# Patient Record
Sex: MALE | URBAN - METROPOLITAN AREA
[De-identification: names, ages, dates, MRNs, and addresses within clinical notes are randomized per-mention and may not be internally consistent; named-entity substitution may affect disease eponyms.]

---

## 2018-09-21 ENCOUNTER — APPOINTMENT (RX ONLY)
Dept: URBAN - METROPOLITAN AREA CLINIC 20 | Facility: CLINIC | Age: 53
Setting detail: DERMATOLOGY
End: 2018-09-21

## 2018-09-21 PROBLEM — C44.319 BASAL CELL CARCINOMA OF SKIN OF OTHER PARTS OF FACE: Status: ACTIVE | Noted: 2018-09-21

## 2018-09-21 PROCEDURE — ? MOHS SURGERY PHONE CONSULTATION

## 2018-09-21 PROCEDURE — ? PATIENT SPECIFIC COUNSELING

## 2018-09-21 NOTE — PROCEDURE: PATIENT SPECIFIC COUNSELING
Pt has an allergy to Aropiprazole, Risperidone \\nPt takes Amlodipine for hypertension.
Detail Level: Zone

## 2018-09-21 NOTE — PROCEDURE: MOHS SURGERY PHONE CONSULTATION
Patient's Insurance: Medicaid
Has The Pathology Report Been Received?: Yes
Does The Patient Take Blood Thinners?: No
Which Antibiotic Do They Take For Surgical Prophylaxis?: Amoxicillin (2 grams)
Time Of Mohs Surgery: 11:50AM
Date Of Mohs Surgery: 10/16/2018
Detail Level: Simple
Referring Provider: Dr. Jessica Bhakta
Office Location Of Mohs Surgery: [place] [id number]
Patient Reported Location: RIght Face

## 2018-10-16 ENCOUNTER — APPOINTMENT (RX ONLY)
Dept: URBAN - METROPOLITAN AREA CLINIC 36 | Facility: CLINIC | Age: 53
Setting detail: DERMATOLOGY
End: 2018-10-16

## 2018-10-16 PROBLEM — C44.212 BASAL CELL CARCINOMA OF SKIN OF RIGHT EAR AND EXTERNAL AURICULAR CANAL: Status: ACTIVE | Noted: 2018-10-16

## 2018-10-16 PROBLEM — I10 ESSENTIAL (PRIMARY) HYPERTENSION: Status: ACTIVE | Noted: 2018-10-16

## 2018-10-16 PROCEDURE — ? MOHS SURGERY

## 2018-10-16 PROCEDURE — 17312 MOHS ADDL STAGE: CPT

## 2018-10-16 PROCEDURE — 17311 MOHS 1 STAGE H/N/HF/G: CPT

## 2018-10-16 NOTE — PROCEDURE: MOHS SURGERY
Stage 9: Additional Anesthesia Type: 1% lidocaine with epinephrine
Anesthesia Volume In Cc: 6
Plastic Surgeon Procedure Text (B): After obtaining clear surgical margins the patient was sent to plastics for surgical repair.  The patient understands they will receive post-surgical care and follow-up from the referring physician's office.
Consent (Marginal Mandibular)/Introductory Paragraph: The rationale for Mohs was explained to the patient and consent was obtained. The risks, benefits and alternatives to therapy were discussed in detail. Specifically, the risks of damage to the marginal mandibular branch of the facial nerve, infection, scarring, bleeding, prolonged wound healing, incomplete removal, allergy to anesthesia, and recurrence were addressed. Prior to the procedure, the treatment site was clearly identified and confirmed by the patient. All components of Universal Protocol/PAUSE Rule completed.
Banner Transposition Flap Text: The defect edges were debeveled with a #15 scalpel blade.  Given the location of the defect and the proximity to free margins a Banner transposition flap was deemed most appropriate.  Using a sterile surgical marker, an appropriate flap drawn around the defect. The area thus outlined was incised deep to adipose tissue with a #15 scalpel blade.  The skin margins were undermined to an appropriate distance in all directions utilizing iris scissors.
Cheiloplasty (Less Than 50%) Text: A decision was made to reconstruct the defect with a  cheiloplasty.  The defect was undermined extensively.  Additional obicularis oris muscle was excised with a 15 blade scalpel.  The defect was converted into a full thickness wedge, of less than 50% of the vertical height of the lip, to facilite a better cosmetic result.  Small vessels were then tied off with 5-0 monocyrl. The obicularis oris, superficial fascia, adipose and dermis were then reapproximated.  After the deeper layers were approximated the epidermis was reapproximated with particular care given to realign the vermilion border.
Stage 3: Additional Anesthesia Type: 1% lidocaine with 1:100,000 epinephrine and 408mcg clindamycin/ml and a 1:10 solution of 8.4% sodium bicarbonate
Quadrants Reporting?: 0
Area L Indication Text: Tumors in this location are included in Area L (trunk and extremities).  Mohs surgery is indicated for larger tumors, or tumors with aggressive histologic features, in these anatomic locations.
Tissue Cultured Epidermal Autograft Text: The defect edges were debeveled with a #15 scalpel blade.  Given the location of the defect, shape of the defect and the proximity to free margins a tissue cultured epidermal autograft was deemed most appropriate.  The graft was then trimmed to fit the size of the defect.  The graft was then placed in the primary defect and oriented appropriately.
Quadrant Reporting?: no
Lazy S Complex Repair Preamble Text (Leave Blank If You Do Not Want): Extensive wide undermining was performed at least 2 cm in all directions.
Double Island Pedicle Flap Text: The defect edges were debeveled with a #15 scalpel blade.  Given the location of the defect, shape of the defect and the proximity to free margins a double island pedicle advancement flap was deemed most appropriate.  Using a sterile surgical marker, an appropriate advancement flap was drawn incorporating the defect, outlining the appropriate donor tissue and placing the expected incisions within the relaxed skin tension lines where possible.    The area thus outlined was incised deep to adipose tissue with a #15 scalpel blade.  The skin margins were undermined to an appropriate distance in all directions around the primary defect and laterally outward around the island pedicle utilizing iris scissors.  There was minimal undermining beneath the pedicle flap.
Subsequent Stages Histo Method Verbiage: Using a similar technique to that described above, a thin layer of tissue was removed from all areas where tumor was visible on the previous stage.  The tissue was again oriented, mapped, dyed, and processed as above.
Provider Procedure Text (D): After obtaining clear surgical margins the defect was repaired by another provider.
Show Specific Providers When Referring To Others For Closure?: Yes
Mercedes Flap Text: The defect edges were debeveled with a #15 scalpel blade.  Given the location of the defect, shape of the defect and the proximity to free margins a Mercedes flap was deemed most appropriate.  Using a sterile surgical marker, an appropriate advancement flap was drawn incorporating the defect and placing the expected incisions within the relaxed skin tension lines where possible. The area thus outlined was incised deep to adipose tissue with a #15 scalpel blade.  The skin margins were undermined to an appropriate distance in all directions utilizing iris scissors.
Consent Type: Consent 1 (Standard)
Initial Size Of Lesion: 1.3
A-T Advancement Flap Text: The defect edges were debeveled with a #15 scalpel blade.  Given the location of the defect, shape of the defect and the proximity to free margins an A-T advancement flap was deemed most appropriate.  Using a sterile surgical marker, an appropriate advancement flap was drawn incorporating the defect and placing the expected incisions within the relaxed skin tension lines where possible.    The area thus outlined was incised deep to adipose tissue with a #15 scalpel blade.  The skin margins were undermined to an appropriate distance in all directions utilizing iris scissors.
Cheek Interpolation Flap Text: A decision was made to reconstruct the defect utilizing an interpolation axial flap and a staged reconstruction.  A telfa template was made of the defect.  This telfa template was then used to outline the Cheek Interpolation flap.  The donor area for the pedicle flap was then injected with anesthesia.  The flap was excised through the skin and subcutaneous tissue down to the layer of the underlying musculature.  The interpolation flap was carefully excised within this deep plane to maintain its blood supply.  The edges of the donor site were undermined.   The donor site was closed in a primary fashion.  The pedicle was then rotated into position and sutured.  Once the tube was sutured into place, adequate blood supply was confirmed with blanching and refill.  The pedicle was then wrapped with xeroform gauze and dressed appropriately with a telfa and gauze bandage to ensure continued blood supply and protect the attached pedicle.
Home Suture Removal Text: Patient was provided instructions on removing sutures and will remove their sutures at home.  If they have any questions or difficulties they will call the office.
Dressing: dry sterile dressing
Closure 3 Information: This tab is for additional flaps and grafts above and beyond our usual structured repairs.  Please note if you enter information here it will not currently bill and you will need to add the billing information manually.
Mid-Level Procedure Text (C): After obtaining clear surgical margins the patient was sent to a mid-level provider for surgical repair.  The patient understands they will receive post-surgical care and follow-up from the mid-level provider.
O-T Plasty Text: The defect edges were debeveled with a #15 scalpel blade.  Given the location of the defect, shape of the defect and the proximity to free margins an O-T plasty was deemed most appropriate.  Using a sterile surgical marker, an appropriate O-T plasty was drawn incorporating the defect and placing the expected incisions within the relaxed skin tension lines where possible.    The area thus outlined was incised deep to adipose tissue with a #15 scalpel blade.  The skin margins were undermined to an appropriate distance in all directions utilizing iris scissors.
Oculoplastic Surgeon Procedure Text (B): After obtaining clear surgical margins the patient was sent to oculoplastics for surgical repair.  The patient understands they will receive post-surgical care and follow-up from the referring physician's office.
Ftsg Text: The defect edges were debeveled with a #15 scalpel blade.  Given the location of the defect, shape of the defect and the proximity to free margins a full thickness skin graft was deemed most appropriate.  Using a sterile surgical marker, the primary defect shape was transferred to the donor site. The area thus outlined was incised deep to adipose tissue with a #15 scalpel blade.  The harvested graft was then trimmed of adipose tissue until only dermis and epidermis was left.  The skin margins of the secondary defect were undermined to an appropriate distance in all directions utilizing iris scissors.  The secondary defect was closed with interrupted buried subcutaneous sutures.  The skin edges were then re-apposed with running  sutures.  The skin graft was then placed in the primary defect and oriented appropriately.
Consent 1/Introductory Paragraph: The rationale for Mohs was explained to the patient and consent was obtained. The risks, benefits and alternatives to therapy were discussed in detail. Specifically, the risks of infection, scarring, bleeding, prolonged wound healing, incomplete removal, allergy to anesthesia, nerve injury and recurrence were addressed. Prior to the procedure, the treatment site was clearly identified and confirmed by the patient. All components of Universal Protocol/PAUSE Rule completed.
Melolabial Interpolation Flap Text: A decision was made to reconstruct the defect utilizing an interpolation axial flap and a staged reconstruction.  A telfa template was made of the defect.  This telfa template was then used to outline the melolabial interpolation flap.  The donor area for the pedicle flap was then injected with anesthesia.  The flap was excised through the skin and subcutaneous tissue down to the layer of the underlying musculature.  The pedicle flap was carefully excised within this deep plane to maintain its blood supply.  The edges of the donor site were undermined.   The donor site was closed in a primary fashion.  The pedicle was then rotated into position and sutured.  Once the tube was sutured into place, adequate blood supply was confirmed with blanching and refill.  The pedicle was then wrapped with xeroform gauze and dressed appropriately with a telfa and gauze bandage to ensure continued blood supply and protect the attached pedicle.
M-Plasty Intermediate Repair Preamble Text (Leave Blank If You Do Not Want): Undermining was performed with blunt dissection.
Epidermal Closure: running cuticular
Dorsal Nasal Flap Text: The defect edges were debeveled with a #15 scalpel blade.  Given the location of the defect and the proximity to free margins a dorsal nasal flap,based upon the glabellar folds, was deemed most appropriate.  Using a sterile surgical marker, an appropriate dorsal nasal flap was drawn around the defect.    The area thus outlined was incised deep to adipose tissue with a #15 scalpel blade.  The skin margins were undermined to an appropriate distance in all directions utilizing iris scissors.
Primary Defect Length In Cm (Final Defect Size - Required For Flaps/Grafts): 1.8
Unique Flap 3 Text: The defect edges were debeveled with a #15 scalpel blade.  Given the location of the defect, shape of the defect and the proximity to free margins a Mercedes (double advancement flap) was deemed most appropriate.  Using a sterile surgical marker, the appropriate transposition flaps were drawn incorporating the defect and placing the expected incisions within the relaxed skin tension lines where possible.    The area thus outlined was incised deep to adipose tissue with a #15 scalpel blade.  The skin margins were undermined to an appropriate distance in all directions utilizing iris scissors.  Hemostasis was achieved with electrocautery.  The flaps were then advanced into the defect and anchored with interrupted buried subcutaneous sutures.
Unique Flap 2 Name: Peng Flap
Helical Rim Advancement Flap Text: The defect edges were debeveled with a #15 blade scalpel.  Given the location of the defect and the proximity to free margins (helical rim) a double helical rim advancement flap was deemed most appropriate.  Using a sterile surgical marker, the appropriate advancement flaps were drawn incorporating the defect and placing the expected incisions between the helical rim and antihelix where possible.  The area thus outlined was incised through and through with a #15 scalpel blade.  With a skin hook and iris scissors, the flaps were gently and sharply undermined and freed up.
No Repair - Repaired With Adjacent Surgical Defect Text (Leave Blank If You Do Not Want): After obtaining clear surgical margins the defect was repaired concurrently with another surgical defect which was in close approximation.
Localized Dermabrasion With Wire Brush Text: The patient was draped in routine manner.  Localized dermabrasion using 3 x 17 mm wire brush was performed in routine manner to papillary dermis. This spot dermabrasion is being performed to complete skin cancer reconstruction. It also will eliminate the other sun damaged precancerous cells that are known to be part of the regional effect of a lifetime's worth of sun exposure. This localized dermabrasion is therapeutic and should not be considered cosmetic in any regard.
Cartilage Graft Text: The defect edges were debeveled with a #15 scalpel blade.  Given the location of the defect, shape of the defect, the fact the defect involved a full thickness cartilage defect a cartilage graft was deemed most appropriate.  An appropriate donor site was identified, cleansed, and anesthetized. The cartilage graft was then harvested and transferred to the recipient site, oriented appropriately and then sutured into place.  The secondary defect was then repaired using a primary closure.
Epidermal Closure Graft Donor Site (Optional): simple interrupted
V-Y Plasty Text: The defect edges were debeveled with a #15 scalpel blade.  Given the location of the defect, shape of the defect and the proximity to free margins an V-Y advancement flap was deemed most appropriate.  Using a sterile surgical marker, an appropriate advancement flap was drawn incorporating the defect and placing the expected incisions within the relaxed skin tension lines where possible.    The area thus outlined was incised deep to adipose tissue with a #15 scalpel blade.  The skin margins were undermined to an appropriate distance in all directions utilizing iris scissors.
Consent (Spinal Accessory)/Introductory Paragraph: The rationale for Mohs was explained to the patient and consent was obtained. The risks, benefits and alternatives to therapy were discussed in detail. Specifically, the risks of damage to the spinal accessory nerve, infection, scarring, bleeding, prolonged wound healing, incomplete removal, allergy to anesthesia, and recurrence were addressed. Prior to the procedure, the treatment site was clearly identified and confirmed by the patient. All components of Universal Protocol/PAUSE Rule completed.
Asc Procedure Text (C): After obtaining clear surgical margins the patient was sent to an ASC for surgical repair.  The patient understands they will receive post-surgical care and follow-up from the ASC physician.
Hemostasis: Electrocautery
Star Wedge Flap Text: The defect edges were debeveled with a #15 scalpel blade.  Given the location of the defect, shape of the defect and the proximity to free margins a star wedge flap was deemed most appropriate.  Using a sterile surgical marker, an appropriate rotation flap was drawn incorporating the defect and placing the expected incisions within the relaxed skin tension lines where possible. The area thus outlined was incised deep to adipose tissue with a #15 scalpel blade.  The skin margins were undermined to an appropriate distance in all directions utilizing iris scissors.
Otolaryngologist Procedure Text (D): After obtaining clear surgical margins the patient was sent to otolaryngology for surgical repair.  The patient understands they will receive post-surgical care and follow-up from the referring physician's office.
Consent (Scalp)/Introductory Paragraph: The rationale for Mohs was explained to the patient and consent was obtained. The risks, benefits and alternatives to therapy were discussed in detail. Specifically, the risks of changes in hair growth pattern secondary to repair, infection, scarring, bleeding, prolonged wound healing, incomplete removal, allergy to anesthesia, nerve injury and recurrence were addressed. Prior to the procedure, the treatment site was clearly identified and confirmed by the patient. All components of Universal Protocol/PAUSE Rule completed.
Mohs Rapid Report Verbiage: The area of clinically evident tumor was marked with skin marking ink and appropriately hatched.  The initial incision was made following the Mohs approach through the skin.  The specimen was taken to the lab, divided into the necessary number of pieces, chromacoded and processed according to the Mohs protocol.  This was repeated in successive stages until a tumor free defect was achieved.
Modified Advancement Flap Text: The defect edges were debeveled with a #15 scalpel blade.  Given the location of the defect, shape of the defect and the proximity to free margins a modified advancement flap was deemed most appropriate.  Using a sterile surgical marker, an appropriate advancement flap was drawn incorporating the defect and placing the expected incisions within the relaxed skin tension lines where possible.    The area thus outlined was incised deep to adipose tissue with a #15 scalpel blade.  The skin margins were undermined to an appropriate distance in all directions utilizing iris scissors.
Double M-Plasty Complex Repair Preamble Text (Leave Blank If You Do Not Want): Extensive wide undermining was performed.
Unique Flap 1 Text: A decision was made to reconstruct the defect utilizing a myocutaneous Island pedicle Flap based on the levator labii superioris muscle.  A telfa template was made of the defect.  This telfa template was then used to outline the myocutaneous flap, based along the meilolabial fold.  The donor area for the pedicle flap was then injected with anesthesia.  The flap was excised through the skin and subcutaneous tissue down to the layer of the underlying musculature.  The myocutaneous flap was carefully excised within this deep plane to maintain its blood supply. Based on the muscle. The edges of the donor site were undermined.   The donor site was closed in a primary fashion to the point of transposition.  The pedicle was then transposed into position and sutured.  Once the flap was sutured into place, adequate blood supply was confirmed with blanching and refill.
Melolabial Transposition Flap Text: The defect edges were debeveled with a #15 scalpel blade.  Given the location of the defect and the proximity to free margins a melolabial flap was deemed most appropriate.  Using a sterile surgical marker, an appropriate melolabial transposition flap was drawn incorporating the defect.    The area thus outlined was incised deep to adipose tissue with a #15 scalpel blade.  The skin margins were undermined to an appropriate distance in all directions utilizing iris scissors.
Tarsorrhaphy Text: A tarsorrhaphy was performed using Frost sutures.
Medical Necessity Statement: Based on my medical judgement, Mohs surgery is the most appropriate treatment for this cancer compared to other treatments.
Secondary Intention Text (Leave Blank If You Do Not Want): The defect will heal with secondary intention.
Mauc Instructions: By selecting yes to the question below the MAUC number will be added into the note.  This will be calculated automatically based on the diagnosis chosen, the size entered, the body zone selected (H,M,L) and the specific indications you chose. You will also have the option to override the Mohs AUC if you disagree with the automatically calculated number and this option is found in the Case Summary tab.
Wound Care: Aquaphor
Cheiloplasty (Complex) Text: A decision was made to reconstruct the defect with a  cheiloplasty.  The defect was undermined extensively.  Additional obicularis oris muscle was excised with a 15 blade scalpel.  The defect was converted into a full thickness wedge to facilite a better cosmetic result.  Small vessels were then tied off with 5-0 monocyrl. The obicularis oris, superficial fascia, adipose and dermis were then reapproximated.  After the deeper layers were approximated the epidermis was reapproximated with particular care given to realign the vermilion border.
Ear Star Wedge Flap Text: The defect edges were debeveled with a #15 blade scalpel.  Given the location of the defect and the proximity to free margins (helical rim) an ear star wedge flap was deemed most appropriate.  Using a sterile surgical marker, the appropriate flap was drawn incorporating the defect and placing the expected incisions between the helical rim and antihelix where possible.  The area thus outlined was incised through and through with a #15 scalpel blade.
Graft Donor Site Epidermal Sutures (Optional): 5-0 Ethibond
Crescentic Advancement Flap Text: The defect edges were debeveled with a #15 scalpel blade.  Given the location of the defect and the proximity to free margins a crescentic advancement flap was deemed most appropriate.  Using a sterile surgical marker, the appropriate advancement flap was drawn incorporating the defect and placing the expected incisions within the relaxed skin tension lines where possible.    The area thus outlined was incised deep to adipose tissue with a #15 scalpel blade.  The skin margins were undermined to an appropriate distance in all directions utilizing iris scissors.
Inflammation Suggestive Of Cancer Camouflage Histology Text: There was a dense lymphocytic infiltrate which prevented adequate histologic evaluation of adjacent structures.
Eye Protection Verbiage: Before proceeding with the stage, a plastic scleral shield was inserted. The globe was anesthetized with a few drops of 1% lidocaine with 1:100,000 epinephrine. Then, an appropriate sized scleral shield was chosen and coated with lacrilube ointment. The shield was gently inserted and left in place for the duration of each stage. After the stage was completed, the shield was gently removed.
Bcc Infiltrative Histology Text: There were numerous aggregates of basaloid cells demonstrating an infiltrative pattern.
Skin Substitute Text: The defect edges were debeveled with a #15 scalpel blade.  Given the location of the defect, shape of the defect and the proximity to free margins a skin substitute graft was deemed most appropriate.  The graft material was trimmed to fit the size of the defect. The graft was then placed in the primary defect and oriented appropriately.
Keystone Flap Text: The defect edges were debeveled with a #15 scalpel blade.  Given the location of the defect, shape of the defect a keystone flap was deemed most appropriate.  Using a sterile surgical marker, an appropriate keystone flap was drawn incorporating the defect, outlining the appropriate donor tissue and placing the expected incisions within the relaxed skin tension lines where possible. The area thus outlined was incised deep to adipose tissue with a #15 scalpel blade.  The skin margins were undermined to an appropriate distance in all directions around the primary defect and laterally outward around the flap utilizing iris scissors.
Anesthesia Type: 1% lidocaine with 1:100,000 epinephrine and a 1:10 solution of 8.4% sodium bicarbonate
Unique Flap 4 Text: The defect edges were debeveled with a #15 scalpel blade.  Given the location of the defect and the proximity to free margins a Banner transposition flap was deemed most appropriate.  Using a sterile surgical marker, an appropriate Banner transposition flap was drawn incorporating the defect.    The area thus outlined was incised deep to adipose tissue with a #15 scalpel blade.  The skin margins were undermined to an appropriate distance in all directions utilizing iris scissors.
Spiral Flap Text: The defect edges were debeveled with a #15 scalpel blade.  Given the location of the defect, shape of the defect and the proximity to free margins a spiral flap was deemed most appropriate.  Using a sterile surgical marker, an appropriate rotation flap was drawn incorporating the defect and placing the expected incisions within the relaxed skin tension lines where possible. The area thus outlined was incised deep to adipose tissue with a #15 scalpel blade.  The skin margins were undermined to an appropriate distance in all directions utilizing iris scissors.
O-L Flap Text: The defect edges were debeveled with a #15 scalpel blade.  Given the location of the defect, shape of the defect and the proximity to free margins an O-L flap was deemed most appropriate.  Using a sterile surgical marker, an appropriate advancement flap was drawn incorporating the defect and placing the expected incisions within the relaxed skin tension lines where possible.    The area thus outlined was incised deep to adipose tissue with a #15 scalpel blade.  The skin margins were undermined to an appropriate distance in all directions utilizing iris scissors.
Repair Anesthesia Method: local infiltration
Trilobed Flap Text: The defect edges were debeveled with a #15 scalpel blade.  Given the location of the defect and the proximity to free margins a trilobed flap was deemed most appropriate.  Using a sterile surgical marker, an appropriate trilobed flap drawn around the defect.    The area thus outlined was incised deep to adipose tissue with a #15 scalpel blade.  The skin margins were undermined to an appropriate distance in all directions utilizing iris scissors.
H Plasty Text: Given the location of the defect, shape of the defect and the proximity to free margins a H-plasty was deemed most appropriate for repair.  Using a sterile surgical marker, the appropriate advancement arms of the H-plasty were drawn incorporating the defect and placing the expected incisions within the relaxed skin tension lines where possible. The area thus outlined was incised deep to adipose tissue with a #15 scalpel blade. The skin margins were undermined to an appropriate distance in all directions utilizing iris scissors.  The opposing advancement arms were then advanced into place in opposite direction and anchored with interrupted buried subcutaneous sutures.
Epidermal Sutures: 5-0 Ethilon
Composite Graft Text: The defect edges were debeveled with a #15 scalpel blade.  Given the location of the defect, shape of the defect, the proximity to free margins and the fact the defect was full thickness a composite graft was deemed most appropriate.  The defect was outline and then transferred to the donor site.  A full thickness graft was then excised from the donor site. The graft was then placed in the primary defect, oriented appropriately and then sutured into place.  The secondary defect was then repaired using a primary closure.
Unique Flap 1 Name: Myocutaneous Island pedicle Flap
Consent 2/Introductory Paragraph: Mohs surgery was explained to the patient and consent was obtained. The risks, benefits and alternatives to therapy were discussed in detail. Specifically, the risks of infection, scarring, bleeding, prolonged wound healing, incomplete removal, allergy to anesthesia, nerve injury and recurrence were addressed. Prior to the procedure, the treatment site was clearly identified and confirmed by the patient. All components of Universal Protocol/PAUSE Rule completed.
Postop Diagnosis: same
Island Pedicle Flap Text: The defect edges were debeveled with a #15 scalpel blade.  Given the location of the defect, shape of the defect and the proximity to free margins an island pedicle advancement flap was deemed most appropriate.  Using a sterile surgical marker, an appropriate advancement flap was drawn incorporating the defect, outlining the appropriate donor tissue and placing the expected incisions within the relaxed skin tension lines where possible.    The area thus outlined was incised deep to adipose tissue with a #15 scalpel blade.  The skin margins were undermined to an appropriate distance in all directions around the primary defect and laterally outward around the island pedicle utilizing iris scissors.  There was minimal undermining beneath the pedicle flap.
Interpolation Flap Text: A decision was made to reconstruct the defect utilizing an interpolation axial flap and a staged reconstruction.  A telfa template was made of the defect.  This telfa template was then used to outline the interpolation flap.  The donor area for the pedicle flap was then injected with anesthesia.  The flap was excised through the skin and subcutaneous tissue down to the layer of the underlying musculature.  The interpolation flap was carefully excised within this deep plane to maintain its blood supply.  The edges of the donor site were undermined.   The donor site was closed in a primary fashion.  The pedicle was then rotated into position and sutured.  Once the tube was sutured into place, adequate blood supply was confirmed with blanching and refill.  The pedicle was then wrapped with xeroform gauze and dressed appropriately with a telfa and gauze bandage to ensure continued blood supply and protect the attached pedicle.
Consent (Lip)/Introductory Paragraph: The rationale for Mohs was explained to the patient and consent was obtained. The risks, benefits and alternatives to therapy were discussed in detail. Specifically, the risks of lip deformity, changes in the oral aperture, infection, scarring, bleeding, prolonged wound healing, incomplete removal, allergy to anesthesia, nerve injury and recurrence were addressed. Prior to the procedure, the treatment site was clearly identified and confirmed by the patient. All components of Universal Protocol/PAUSE Rule completed.
Area M Indication Text: Tumors in this location are included in Area M (cheek, forehead, scalp, neck, jawline and pretibial skin).  Mohs surgery is indicated for tumors in these anatomic locations.
Advancement Flap (Single) Text: The defect edges were debeveled with a #15 scalpel blade.  Given the location of the defect and the proximity to free margins a single advancement flap was deemed most appropriate.  Using a sterile surgical marker, an appropriate advancement flap was drawn incorporating the defect and placing the expected incisions within the relaxed skin tension lines where possible.    The area thus outlined was incised deep to adipose tissue with a #15 scalpel blade.  The skin margins were undermined to an appropriate distance in all directions utilizing iris scissors.
Purse String (Intermediate) Text: Given the location of the defect and the characteristics of the surrounding skin a purse string intermediate closure was deemed most appropriate.  Undermining was performed circumfirentially around the surgical defect.  A purse string suture was then placed and tightened.
Area H Indication Text: Tumors in this location are included in Area H (eyelids, eyebrows, nose, lips, chin, ear, pre-auricular, post-auricular, temple, genitalia, hands, feet, ankles and areola).  Tissue conservation is critical in these anatomic locations.
Bilateral Helical Rim Advancement Flap Text: The defect edges were debeveled with a #15 blade scalpel.  Given the location of the defect and the proximity to free margins (helical rim) a bilateral helical rim advancement flap was deemed most appropriate.  Using a sterile surgical marker, the appropriate advancement flaps were drawn incorporating the defect and placing the expected incisions between the helical rim and antihelix where possible.  The area thus outlined was incised through and through with a #15 scalpel blade.  With a skin hook and iris scissors, the flaps were gently and sharply undermined and freed up.
S Plasty Text: Given the location and shape of the defect, and the orientation of relaxed skin tension lines, an S-plasty was deemed most appropriate for repair.  Using a sterile surgical marker, the appropriate outline of the S-plasty was drawn, incorporating the defect and placing the expected incisions within the relaxed skin tension lines where possible.  The area thus outlined was incised deep to adipose tissue with a #15 scalpel blade.  The skin margins were undermined to an appropriate distance in all directions utilizing iris scissors. The skin flaps were advanced over the defect.  The opposing margins were then approximated with interrupted buried subcutaneous sutures.
Island Pedicle Flap-Requiring Vessel Identification Text: The defect edges were debeveled with a #15 scalpel blade.  Given the location of the defect, shape of the defect and the proximity to free margins an island pedicle advancement flap was deemed most appropriate.  Using a sterile surgical marker, an appropriate advancement flap was drawn, based on the axial vessel mentioned above, incorporating the defect, outlining the appropriate donor tissue and placing the expected incisions within the relaxed skin tension lines where possible.    The area thus outlined was incised deep to adipose tissue with a #15 scalpel blade.  The skin margins were undermined to an appropriate distance in all directions around the primary defect and laterally outward around the island pedicle utilizing iris scissors.  There was minimal undermining beneath the pedicle flap.
Deep Sutures: 5-0 Vicryl
Number Of Stages: 2
No Residual Tumor Seen Histology Text: There were no malignant cells seen in the sections examined.
Mucosal Advancement Flap Text: Given the location of the defect, shape of the defect and the proximity to free margins a mucosal advancement flap was deemed most appropriate. Incisions were made with a 15 blade scalpel in the appropriate fashion along the cutaneous vermilion border and the mucosal lip. The remaining actinically damaged mucosal tissue was excised.  The mucosal advancement flap was then elevated to the gingival sulcus with care taken to preserve the neurovascular structures and advanced into the primary defect. Care was taken to ensure that precise realignment of the vermilion border was achieved.
Closure 2 Information: This tab is for additional flaps and grafts, including complex repair and grafts and complex repair and flaps. You can also specify a different location for the additional defect, if the location is the same you do not need to select a new one. We will insert the automated text for the repair you select below just as we do for solitary flaps and grafts. Please note that at this time if you select a location with a different insurance zone you will need to override the ICD10 and CPT if appropriate.
Mohs Histo Method Verbiage: Each section was then chromacoded and processed in the Mohs lab using the Mohs protocol and submitted for frozen section.
Unique Flap 4 Name: Banner Flap
Island Pedicle Flap With Canthal Suspension Text: The defect edges were debeveled with a #15 scalpel blade.  Given the location of the defect, shape of the defect and the proximity to free margins an island pedicle advancement flap was deemed most appropriate.  Using a sterile surgical marker, an appropriate advancement flap was drawn incorporating the defect, outlining the appropriate donor tissue and placing the expected incisions within the relaxed skin tension lines where possible. The area thus outlined was incised deep to adipose tissue with a #15 scalpel blade.  The skin margins were undermined to an appropriate distance in all directions around the primary defect and laterally outward around the island pedicle utilizing iris scissors.  There was minimal undermining beneath the pedicle flap. A suspension suture was placed in the canthal tendon to prevent tension and prevent ectropion.
Rhombic Flap Text: The defect edges were debeveled with a #15 scalpel blade.  Given the location of the defect and the proximity to free margins a rhombic flap was deemed most appropriate.  Using a sterile surgical marker, an appropriate rhombic flap was drawn incorporating the defect.    The area thus outlined was incised deep to adipose tissue with a #15 scalpel blade.  The skin margins were undermined to an appropriate distance in all directions utilizing iris scissors.
Rotation Flap Text: The defect edges were debeveled with a #15 scalpel blade.  Given the location of the defect, shape of the defect and the proximity to free margins a rotation flap was deemed most appropriate.  Using a sterile surgical marker, an appropriate rotation flap was drawn incorporating the defect and placing the expected incisions within the relaxed skin tension lines where possible.    The area thus outlined was incised deep to adipose tissue with a #15 scalpel blade.  The skin margins were undermined to an appropriate distance in all directions utilizing iris scissors.
Graft Donor Site Bandage (Optional-Leave Blank If You Don't Want In Note): Aquaphor and telefa placed on wound. Pressure dressing applied to donor site
Bcc Histology Text: There were numerous aggregates of basaloid cells.
Burow's Advancement Flap Text: The defect edges were debeveled with a #15 scalpel blade.  Given the location of the defect and the proximity to free margins a Burow's advancement flap was deemed most appropriate.  Using a sterile surgical marker, the appropriate advancement flap was drawn incorporating the defect and placing the expected incisions within the relaxed skin tension lines where possible.    The area thus outlined was incised deep to adipose tissue with a #15 scalpel blade.  The skin margins were undermined to an appropriate distance in all directions utilizing iris scissors.
Graft Cartilage Fenestration Text: The cartilage was fenestrated with a 2mm punch biopsy to help facilitate graft survival and healing.
Complex Repair And Flap Additional Text (Will Appearing After The Standard Complex Repair Text): The complex repair was not sufficient to completely close the primary defect. The remaining additional defect was repaired with the flap mentioned below.
Mohs Method Verbiage: An incision at a 45 degree angle following the standard Mohs approach was done and the specimen was harvested as a microscopic controlled layer.
Referring Physician (Optional): Dr. Jessica Pride
Consent 3/Introductory Paragraph: I gave the patient a chance to ask questions they had about the procedure.  Following this I explained the Mohs procedure and consent was obtained. The risks, benefits and alternatives to therapy were discussed in detail. Specifically, the risks of infection, scarring, bleeding, prolonged wound healing, incomplete removal, allergy to anesthesia, nerve injury and recurrence were addressed. Prior to the procedure, the treatment site was clearly identified and confirmed by the patient. All components of Universal Protocol/PAUSE Rule completed.
Manual Repair Warning Statement: We plan on removing the manually selected variable below in favor of our much easier automatic structured text blocks found in the previous tab. We decided to do this to help make the flow better and give you the full power of structured data. Manual selection is never going to be ideal in our platform and I would encourage you to avoid using manual selection from this point on, especially since I will be sunsetting this feature. It is important that you do one of two things with the customized text below. First, you can save all of the text in a word file so you can have it for future reference. Second, transfer the text to the appropriate area in the Library tab. Lastly, if there is a flap or graft type which we do not have you need to let us know right away so I can add it in before the variable is hidden. No need to panic, we plan to give you roughly 6 months to make the change.
V-Y Flap Text: The defect edges were debeveled with a #15 scalpel blade.  Given the location of the defect, shape of the defect and the proximity to free margins a V-Y flap was deemed most appropriate.  Using a sterile surgical marker, an appropriate advancement flap was drawn incorporating the defect and placing the expected incisions within the relaxed skin tension lines where possible.    The area thus outlined was incised deep to adipose tissue with a #15 scalpel blade.  The skin margins were undermined to an appropriate distance in all directions utilizing iris scissors.
Bilobed Transposition Flap Text: The defect edges were debeveled with a #15 scalpel blade.  Given the location of the defect and the proximity to free margins a bilobed transposition flap was deemed most appropriate.  Using a sterile surgical marker, an appropriate bilobe flap drawn around the defect.    The area thus outlined was incised deep to adipose tissue with a #15 scalpel blade.  The skin margins were undermined to an appropriate distance in all directions utilizing iris scissors.
W Plasty Text: The lesion was extirpated to the level of the fat with a #15 scalpel blade.  Given the location of the defect, shape of the defect and the proximity to free margins a W-plasty was deemed most appropriate for repair.  Using a sterile surgical marker, the appropriate transposition arms of the W-plasty were drawn incorporating the defect and placing the expected incisions within the relaxed skin tension lines where possible.    The area thus outlined was incised deep to adipose tissue with a #15 scalpel blade.  The skin margins were undermined to an appropriate distance in all directions utilizing iris scissors.  The opposing transposition arms were then transposed into place in opposite direction and anchored with interrupted buried subcutaneous sutures.
Non-Graft Cartilage Fenestration Text: The cartilage was fenestrated with a 2mm punch biopsy to help facilitate healing.
Suture Removal: 7 days
Graft Basting Suture (Optional): 5-0 Fast Absorbing Gut
Complex Repair And Graft Additional Text (Will Appearing After The Standard Complex Repair Text): The complex repair was not sufficient to completely close the primary defect. The remaining additional defect was repaired with the graft mentioned below.
Purse String (Simple) Text: Given the location of the defect and the characteristics of the surrounding skin a purse string closure was deemed most appropriate.  Undermining was performed circumfirentially around the surgical defect.  A purse string suture was then placed and tightened.
Consent (Nose)/Introductory Paragraph: The rationale for Mohs was explained to the patient and consent was obtained. The risks, benefits and alternatives to therapy were discussed in detail. Specifically, the risks of nasal deformity, changes in the flow of air through the nose, infection, scarring, bleeding, prolonged wound healing, incomplete removal, allergy to anesthesia, nerve injury and recurrence were addressed. Prior to the procedure, the treatment site was clearly identified and confirmed by the patient. All components of Universal Protocol/PAUSE Rule completed.
Ear Wedge Repair Text: A wedge excision was completed by carrying down an excision through the full thickness of the ear and cartilage with an inward facing Burow's triangle. The wound was then closed in a layered fashion.
Epidermal Autograft Text: The defect edges were debeveled with a #15 scalpel blade.  Given the location of the defect, shape of the defect and the proximity to free margins an epidermal autograft was deemed most appropriate.  Using a sterile surgical marker, the primary defect shape was transferred to the donor site. The epidermal graft was then harvested.  The skin graft was then placed in the primary defect and oriented appropriately.
Muscle Hinge Flap Text: The defect edges were debeveled with a #15 scalpel blade.  Given the size, depth and location of the defect and the proximity to free margins a muscle hinge flap was deemed most appropriate.  Using a sterile surgical marker, an appropriate hinge flap was drawn incorporating the defect. The area thus outlined was incised with a #15 scalpel blade.  The skin margins were undermined to an appropriate distance in all directions utilizing iris scissors.
Cheek-To-Nose Interpolation Flap Text: A decision was made to reconstruct the defect utilizing an interpolation axial flap and a staged reconstruction.  A telfa template was made of the defect.  This telfa template was then used to outline the Cheek-To-Nose Interpolation flap.  The donor area for the pedicle flap was then injected with anesthesia.  The flap was excised through the skin and subcutaneous tissue down to the layer of the underlying musculature.  The interpolation flap was carefully excised within this deep plane to maintain its blood supply.  The edges of the donor site were undermined.   The donor site was closed in a primary fashion.  The pedicle was then rotated into position and sutured.  Once the tube was sutured into place, adequate blood supply was confirmed with blanching and refill.  The pedicle was then wrapped with xeroform gauze and dressed appropriately with a telfa and gauze bandage to ensure continued blood supply and protect the attached pedicle.
Repair Type: None (only Mohs)
Hatchet Flap Text: The defect edges were debeveled with a #15 scalpel blade.  Given the location of the defect, shape of the defect and the proximity to free margins a hatchet flap based from the glabella was deemed most appropriate.  Using a sterile surgical marker, an appropriate glabellar hatchet flap was drawn incorporating the defect and placing the expected incisions within the relaxed skin tension lines where possible.    The area thus outlined was incised deep to adipose tissue with a #15 scalpel blade.  The skin margins were undermined to an appropriate distance in all directions utilizing iris scissors.
Stage 1: Number Of Blocks?: 1
Post-Care Instructions: I reviewed with the patient in detail post-care instructions. Patient is not to engage in any heavy lifting, exercise, or swimming for the next 14 days. Should the patient develop any fevers, chills, bleeding, severe pain patient will contact the office immediately.
Paramedian Forehead Flap Text: A decision was made to reconstruct the defect utilizing an interpolation axial flap and a staged reconstruction.  A telfa template was made of the defect.  This telfa template was then used to outline the paramedian forehead pedicle flap.  The donor area for the pedicle flap was then injected with anesthesia.  The flap was excised through the skin and subcutaneous tissue down to the layer of the underlying musculature.  The pedicle flap was carefully excised within this deep plane to maintain its blood supply.  The edges of the donor site were undermined.   The donor site was closed in a primary fashion.  The pedicle was then rotated into position and sutured.  Once the tube was sutured into place, adequate blood supply was confirmed with blanching and refill.  The pedicle was then wrapped with xeroform gauze and dressed appropriately with a telfa and gauze bandage to ensure continued blood supply and protect the attached pedicle.
Donor Site Anesthesia Type: same as repair anesthesia
Wound Care (No Sutures): Petrolatum
Advancement Flap (Double) Text: The defect edges were debeveled with a #15 scalpel blade.  Given the location of the defect and the proximity to free margins a double advancement flap was deemed most appropriate.  Using a sterile surgical marker, the appropriate advancement flaps were drawn incorporating the defect and placing the expected incisions within the relaxed skin tension lines where possible.    The area thus outlined was incised deep to adipose tissue with a #15 scalpel blade.  The skin margins were undermined to an appropriate distance in all directions utilizing iris scissors.
Consent (Temporal Branch)/Introductory Paragraph: The rationale for Mohs was explained to the patient and consent was obtained. The risks, benefits and alternatives to therapy were discussed in detail. Specifically, the risks of damage to the temporal branch of the facial nerve, infection, scarring, bleeding, prolonged wound healing, incomplete removal, allergy to anesthesia, and recurrence were addressed. Prior to the procedure, the treatment site was clearly identified and confirmed by the patient. All components of Universal Protocol/PAUSE Rule completed.
Unna Boot Text: An Unna boot was placed to help immobilize the limb and facilitate more rapid healing.
O-T Advancement Flap Text: The defect edges were debeveled with a #15 scalpel blade.  Given the location of the defect, shape of the defect and the proximity to free margins an O-T advancement flap was deemed most appropriate.  Using a sterile surgical marker, an appropriate advancement flap was drawn incorporating the defect and placing the expected incisions within the relaxed skin tension lines where possible.    The area thus outlined was incised deep to adipose tissue with a #15 scalpel blade.  The skin margins were undermined to an appropriate distance in all directions utilizing iris scissors.
Surgeon/Pathologist Verbiage (Will Incorporate Name Of Surgeon From Intro If Not Blank): operated in two distinct and integrated capacities as the surgeon and pathologist.
Unique Flap 3 Name: Mercedes Flap
Partial Purse String (Simple) Text: Given the location of the defect and the characteristics of the surrounding skin a simple purse string closure was deemed most appropriate.  Undermining was performed circumfirentially around the surgical defect.  A purse string suture was then placed and tightened. Wound tension only allowed a partial closure of the circular defect.
Mastoid Interpolation Flap Text: A decision was made to reconstruct the defect utilizing an interpolation axial flap and a staged reconstruction.  A telfa template was made of the defect.  This telfa template was then used to outline the mastoid interpolation flap.  The donor area for the pedicle flap was then injected with anesthesia.  The flap was excised through the skin and subcutaneous tissue down to the layer of the underlying musculature.  The pedicle flap was carefully excised within this deep plane to maintain its blood supply.  The edges of the donor site were undermined.   The donor site was closed in a primary fashion.  The pedicle was then rotated into position and sutured.  Once the tube was sutured into place, adequate blood supply was confirmed with blanching and refill.  The pedicle was then wrapped with xeroform gauze and dressed appropriately with a telfa and gauze bandage to ensure continued blood supply and protect the attached pedicle.
Z Plasty Text: The lesion was extirpated to the level of the fat with a #15 scalpel blade.  Given the location of the defect, shape of the defect and the proximity to free margins a Z-plasty was deemed most appropriate for repair.  Using a sterile surgical marker, the appropriate transposition arms of the Z-plasty were drawn incorporating the defect and placing the expected incisions within the relaxed skin tension lines where possible.    The area thus outlined was incised deep to adipose tissue with a #15 scalpel blade.  The skin margins were undermined to an appropriate distance in all directions utilizing iris scissors.  The opposing transposition arms were then transposed into place in opposite direction and anchored with interrupted buried subcutaneous sutures.
Xenograft Text: The defect edges were debeveled with a #15 scalpel blade.  Given the location of the defect, shape of the defect and the proximity to free margins a xenograft was deemed most appropriate.  The graft was then trimmed to fit the size of the defect.  The graft was then placed in the primary defect and oriented appropriately.
Surgeon Performing Repair (Optional): William
Alar Island Pedicle Flap Text: The defect edges were debeveled with a #15 scalpel blade.  Given the location of the defect, shape of the defect and the proximity to the alar rim an island pedicle advancement flap was deemed most appropriate.  Using a sterile surgical marker, an appropriate advancement flap was drawn incorporating the defect, outlining the appropriate donor tissue and placing the expected incisions within the nasal ala running parallel to the alar rim. The area thus outlined was incised with a #15 scalpel blade.  The skin margins were undermined minimally to an appropriate distance in all directions around the primary defect and laterally outward around the island pedicle utilizing iris scissors.  There was minimal undermining beneath the pedicle flap.
Full Thickness Lip Wedge Repair (Flap) Text: Given the location of the defect and the proximity to free margins a full thickness wedge repair was deemed most appropriate.  Using a sterile surgical marker, the appropriate repair was drawn incorporating the defect and placing the expected incisions perpendicular to the vermilion border.  The vermilion border was also meticulously outlined to ensure appropriate reapproximation during the repair.  The area thus outlined was incised through and through with a #15 scalpel blade.  The muscularis and dermis were reaproximated with deep sutures following hemostasis. Care was taken to realign the vermilion border before proceeding with the superficial closure.  Once the vermilion was realigned the superfical and mucosal closure was finished.
Detail Level: Detailed
Estimated Blood Loss (Cc): less than 5 cc
Consent (Ear)/Introductory Paragraph: The rationale for Mohs was explained to the patient and consent was obtained. The risks, benefits and alternatives to therapy were discussed in detail. Specifically, the risks of ear deformity, infection, scarring, bleeding, prolonged wound healing, incomplete removal, allergy to anesthesia, nerve injury and recurrence were addressed. Prior to the procedure, the treatment site was clearly identified and confirmed by the patient. All components of Universal Protocol/PAUSE Rule completed.
Alternatives Discussed Intro (Do Not Add Period): I discussed alternative treatments to Mohs surgery and specifically discussed the risks and benefits of
Dermal Autograft Text: The defect edges were debeveled with a #15 scalpel blade.  Given the location of the defect, shape of the defect and the proximity to free margins a dermal autograft was deemed most appropriate.  Using a sterile surgical marker, the primary defect shape was transferred to the donor site. The area thus outlined was incised deep to adipose tissue with a #15 scalpel blade.  The harvested graft was then trimmed of adipose and epidermal tissue until only dermis was left.  The skin graft was then placed in the primary defect and oriented appropriately.
O-Z Plasty Text: The defect edges were debeveled with a #15 scalpel blade.  Given the location of the defect, shape of the defect and the proximity to free margins an O-Z plasty (double transposition flap) was deemed most appropriate.  Using a sterile surgical marker, the appropriate transposition flaps were drawn incorporating the defect and placing the expected incisions within the relaxed skin tension lines where possible.    The area thus outlined was incised deep to adipose tissue with a #15 scalpel blade.  The skin margins were undermined to an appropriate distance in all directions utilizing iris scissors.  Hemostasis was achieved with electrocautery.  The flaps were then transposed into place, one clockwise and the other counterclockwise, and anchored with interrupted buried subcutaneous sutures.
Split-Thickness Skin Graft Text: The defect edges were debeveled with a #15 scalpel blade.  Given the location of the defect, shape of the defect and the proximity to free margins a split thickness skin graft was deemed most appropriate.  Using a sterile surgical marker, the primary defect shape was transferred to the donor site. The split thickness graft was then harvested.  The skin graft was then placed in the primary defect and oriented appropriately.
Consent (Near Eyelid Margin)/Introductory Paragraph: The rationale for Mohs was explained to the patient and consent was obtained. The risks, benefits and alternatives to therapy were discussed in detail. Specifically, the risks of ectropion or eyelid deformity, infection, scarring, bleeding, prolonged wound healing, incomplete removal, allergy to anesthesia, nerve injury and recurrence were addressed. Prior to the procedure, the treatment site was clearly identified and confirmed by the patient. All components of Universal Protocol/PAUSE Rule completed.
Same Histology In Subsequent Stages Text: The pattern and morphology of the tumor is as described in the first stage.
Advancement-Rotation Flap Text: The defect edges were debeveled with a #15 scalpel blade.  Given the location of the defect, shape of the defect and the proximity to free margins an advancement-rotation flap was deemed most appropriate.  Using a sterile surgical marker, an appropriate flap was drawn incorporating the defect and placing the expected incisions within the relaxed skin tension lines where possible. The area thus outlined was incised deep to adipose tissue with a #15 scalpel blade.  The skin margins were undermined to an appropriate distance in all directions utilizing iris scissors.
Unique Flap 2 Text: A decision was made to reconstruct the defect utilizing a Peng Flap (Bilateral Advancement Rotation Flap). Given the location of the defect and the proximity to free margins, this flap was deemed most appropriate.  Using a sterile surgical marker, the appropriate rotation flaps were drawn incorporating the defect and placing the expected incisions within the relaxed skin tension lines where possible.    The area thus outlined was incised deep to adipose tissue with a #15 scalpel blade.  The skin margins were undermined to an appropriate distance in all directions utilizing iris scissors.
Bilobed Flap Text: The defect edges were debeveled with a #15 scalpel blade.  Given the location of the defect and the proximity to free margins a bilobe flap was deemed most appropriate.  Using a sterile surgical marker, an appropriate bilobe flap drawn around the defect.    The area thus outlined was incised deep to adipose tissue with a #15 scalpel blade.  The skin margins were undermined to an appropriate distance in all directions utilizing iris scissors.
Transposition Flap Text: The defect edges were debeveled with a #15 scalpel blade.  Given the location of the defect and the proximity to free margins a transposition flap was deemed most appropriate.  Using a sterile surgical marker, an appropriate transposition flap was drawn incorporating the defect.    The area thus outlined was incised deep to adipose tissue with a #15 scalpel blade.  The skin margins were undermined to an appropriate distance in all directions utilizing iris scissors.
Bi-Rhombic Flap Text: The defect edges were debeveled with a #15 scalpel blade.  Given the location of the defect and the proximity to free margins a bi-rhombic flap was deemed most appropriate.  Using a sterile surgical marker, an appropriate rhombic flap was drawn incorporating the defect. The area thus outlined was incised deep to adipose tissue with a #15 scalpel blade.  The skin margins were undermined to an appropriate distance in all directions utilizing iris scissors.
Partial Purse String (Intermediate) Text: Given the location of the defect and the characteristics of the surrounding skin an intermediate purse string closure was deemed most appropriate.  Undermining was performed circumfirentially around the surgical defect.  A purse string suture was then placed and tightened. Wound tension only allowed a partial closure of the circular defect.
Posterior Auricular Interpolation Flap Text: A decision was made to reconstruct the defect utilizing an interpolation axial flap and a staged reconstruction.  A telfa template was made of the defect.  This telfa template was then used to outline the posterior auricular interpolation flap.  The donor area for the pedicle flap was then injected with anesthesia.  The flap was excised through the skin and subcutaneous tissue down to the layer of the underlying musculature.  The pedicle flap was carefully excised within this deep plane to maintain its blood supply.  The edges of the donor site were undermined.   The donor site was closed in a primary fashion.  The pedicle was then rotated into position and sutured.  Once the tube was sutured into place, adequate blood supply was confirmed with blanching and refill.  The pedicle was then wrapped with xeroform gauze and dressed appropriately with a telfa and gauze bandage to ensure continued blood supply and protect the attached pedicle.
Mohs Case Number:

## 2018-10-16 NOTE — HPI: SKIN LESION (BASAL CELL CARCINOMA)
How Severe Is Your Skin Cancer?: moderate
Is This A New Presentation, Or A Follow-Up?: Basal Cell Carcinoma
Location From Outside Provider (Will Override Previously Chosen Location): Right malar cheek

## 2018-10-23 ENCOUNTER — APPOINTMENT (RX ONLY)
Dept: URBAN - NONMETROPOLITAN AREA CLINIC 15 | Facility: CLINIC | Age: 53
Setting detail: DERMATOLOGY
End: 2018-10-23

## 2018-10-23 PROBLEM — C44.212 BASAL CELL CARCINOMA OF SKIN OF RIGHT EAR AND EXTERNAL AURICULAR CANAL: Status: ACTIVE | Noted: 2018-10-23

## 2018-10-23 PROCEDURE — ? SUTURE REMOVAL (GLOBAL PERIOD)

## 2018-10-23 NOTE — PROCEDURE: SUTURE REMOVAL (GLOBAL PERIOD)
Body Location Override (Optional - Billing Will Still Be Based On Selected Body Map Location If Applicable): right candelario of helix
Add 84843 Cpt? (Important Note: In 2017 The Use Of 45184 Is Being Tracked By Cms To Determine Future Global Period Reimbursement For Global Periods): no
Detail Level: Detailed

## 2021-07-23 ENCOUNTER — HOSPITAL ENCOUNTER (EMERGENCY)
Facility: MEDICAL CENTER | Age: 56
End: 2021-07-23
Attending: EMERGENCY MEDICINE
Payer: MEDICAID

## 2021-07-23 ENCOUNTER — APPOINTMENT (OUTPATIENT)
Dept: RADIOLOGY | Facility: MEDICAL CENTER | Age: 56
End: 2021-07-23
Payer: MEDICAID

## 2021-07-23 ENCOUNTER — APPOINTMENT (OUTPATIENT)
Dept: RADIOLOGY | Facility: MEDICAL CENTER | Age: 56
End: 2021-07-23
Attending: EMERGENCY MEDICINE
Payer: MEDICAID

## 2021-07-23 VITALS
DIASTOLIC BLOOD PRESSURE: 78 MMHG | WEIGHT: 180.78 LBS | SYSTOLIC BLOOD PRESSURE: 124 MMHG | BODY MASS INDEX: 30.12 KG/M2 | HEIGHT: 65 IN | RESPIRATION RATE: 20 BRPM | TEMPERATURE: 97.6 F | HEART RATE: 95 BPM | OXYGEN SATURATION: 95 %

## 2021-07-23 DIAGNOSIS — R10.30 LOWER ABDOMINAL PAIN: ICD-10-CM

## 2021-07-23 DIAGNOSIS — K62.5 RECTAL BLEEDING: ICD-10-CM

## 2021-07-23 LAB
ABO GROUP BLD: NORMAL
ALBUMIN SERPL BCP-MCNC: 4.8 G/DL (ref 3.2–4.9)
ALBUMIN/GLOB SERPL: 1.8 G/DL
ALP SERPL-CCNC: 70 U/L (ref 30–99)
ALT SERPL-CCNC: 25 U/L (ref 2–50)
ANION GAP SERPL CALC-SCNC: 17 MMOL/L (ref 7–16)
APTT PPP: 27 SEC (ref 24.7–36)
AST SERPL-CCNC: 29 U/L (ref 12–45)
BASOPHILS # BLD AUTO: 0.8 % (ref 0–1.8)
BASOPHILS # BLD: 0.11 K/UL (ref 0–0.12)
BILIRUB SERPL-MCNC: 0.4 MG/DL (ref 0.1–1.5)
BLD GP AB SCN SERPL QL: NORMAL
BUN SERPL-MCNC: 11 MG/DL (ref 8–22)
CALCIUM SERPL-MCNC: 9.1 MG/DL (ref 8.5–10.5)
CHLORIDE SERPL-SCNC: 97 MMOL/L (ref 96–112)
CO2 SERPL-SCNC: 19 MMOL/L (ref 20–33)
CREAT SERPL-MCNC: 1.1 MG/DL (ref 0.5–1.4)
EOSINOPHIL # BLD AUTO: 0.1 K/UL (ref 0–0.51)
EOSINOPHIL NFR BLD: 0.8 % (ref 0–6.9)
ERYTHROCYTE [DISTWIDTH] IN BLOOD BY AUTOMATED COUNT: 47.7 FL (ref 35.9–50)
GLOBULIN SER CALC-MCNC: 2.6 G/DL (ref 1.9–3.5)
GLUCOSE SERPL-MCNC: 85 MG/DL (ref 65–99)
HCT VFR BLD AUTO: 46 % (ref 42–52)
HGB BLD-MCNC: 15.9 G/DL (ref 14–18)
IMM GRANULOCYTES # BLD AUTO: 0.09 K/UL (ref 0–0.11)
IMM GRANULOCYTES NFR BLD AUTO: 0.7 % (ref 0–0.9)
INR PPP: 0.94 (ref 0.87–1.13)
LIPASE SERPL-CCNC: 53 U/L (ref 11–82)
LYMPHOCYTES # BLD AUTO: 2.02 K/UL (ref 1–4.8)
LYMPHOCYTES NFR BLD: 15.4 % (ref 22–41)
MCH RBC QN AUTO: 31.1 PG (ref 27–33)
MCHC RBC AUTO-ENTMCNC: 34.6 G/DL (ref 33.7–35.3)
MCV RBC AUTO: 89.8 FL (ref 81.4–97.8)
MONOCYTES # BLD AUTO: 0.95 K/UL (ref 0–0.85)
MONOCYTES NFR BLD AUTO: 7.2 % (ref 0–13.4)
NEUTROPHILS # BLD AUTO: 9.87 K/UL (ref 1.82–7.42)
NEUTROPHILS NFR BLD: 75.1 % (ref 44–72)
NRBC # BLD AUTO: 0 K/UL
NRBC BLD-RTO: 0 /100 WBC
PLATELET # BLD AUTO: 293 K/UL (ref 164–446)
PMV BLD AUTO: 10.3 FL (ref 9–12.9)
POTASSIUM SERPL-SCNC: 4.4 MMOL/L (ref 3.6–5.5)
PROT SERPL-MCNC: 7.4 G/DL (ref 6–8.2)
PROTHROMBIN TIME: 12.3 SEC (ref 12–14.6)
RBC # BLD AUTO: 5.12 M/UL (ref 4.7–6.1)
RH BLD: NORMAL
SODIUM SERPL-SCNC: 133 MMOL/L (ref 135–145)
WBC # BLD AUTO: 13.1 K/UL (ref 4.8–10.8)

## 2021-07-23 PROCEDURE — 85025 COMPLETE CBC W/AUTO DIFF WBC: CPT

## 2021-07-23 PROCEDURE — 85610 PROTHROMBIN TIME: CPT

## 2021-07-23 PROCEDURE — 82272 OCCULT BLD FECES 1-3 TESTS: CPT | Performed by: EMERGENCY MEDICINE

## 2021-07-23 PROCEDURE — 80053 COMPREHEN METABOLIC PANEL: CPT

## 2021-07-23 PROCEDURE — 36415 COLL VENOUS BLD VENIPUNCTURE: CPT

## 2021-07-23 PROCEDURE — 83690 ASSAY OF LIPASE: CPT

## 2021-07-23 PROCEDURE — 86901 BLOOD TYPING SEROLOGIC RH(D): CPT

## 2021-07-23 PROCEDURE — 86900 BLOOD TYPING SEROLOGIC ABO: CPT

## 2021-07-23 PROCEDURE — 99284 EMERGENCY DEPT VISIT MOD MDM: CPT

## 2021-07-23 PROCEDURE — 700105 HCHG RX REV CODE 258: Performed by: EMERGENCY MEDICINE

## 2021-07-23 PROCEDURE — 71045 X-RAY EXAM CHEST 1 VIEW: CPT

## 2021-07-23 PROCEDURE — 85730 THROMBOPLASTIN TIME PARTIAL: CPT

## 2021-07-23 PROCEDURE — 86850 RBC ANTIBODY SCREEN: CPT

## 2021-07-23 RX ORDER — SODIUM CHLORIDE 9 MG/ML
1000 INJECTION, SOLUTION INTRAVENOUS ONCE
Status: COMPLETED | OUTPATIENT
Start: 2021-07-23 | End: 2021-07-23

## 2021-07-23 RX ORDER — CYCLOBENZAPRINE HCL 10 MG
10 TABLET ORAL 3 TIMES DAILY PRN
Status: SHIPPED | COMMUNITY
End: 2021-08-04

## 2021-07-23 RX ADMIN — SODIUM CHLORIDE 1000 ML: 9 INJECTION, SOLUTION INTRAVENOUS at 15:13

## 2021-07-23 NOTE — ED NOTES
Med Rec completed: per pt at bedside.     No ORAL antibiotics in last 30 days    [facility] P: [phone]    Pt confirmed following allergies:  Allergies   Allergen Reactions   • Sulfamethoxazole Swelling   • Abilify      Flu like sx about 4 years ago   • Risperidone      Flu like sx 4 years ago   • Omeprazole Unspecified     Pt dislikes the way this drug makes him feel      Pt's home medications:   Medication Sig   • cyclobenzaprine (FLEXERIL) 10 mg Tab Take 10 mg by mouth 3 times a day as needed for Muscle Spasms.   • Esomeprazole Magnesium (NEXIUM PO) Take 40 mg by mouth every day. ACID REFLUX   • gabapentin (NEURONTIN) 300 MG Cap Take 300 mg by mouth 3 times a day. Indications: Neuropathic Pain   • rosuvastatin (CRESTOR) 10 MG Tab Take 10 mg by mouth every evening. Indications: Disease of the Heart and Blood Vessels   • divalproex (DEPAKOTE) 250 MG Tablet Delayed Response Take 500 mg by mouth 2 times a day.   • lisinopril (PRINIVIL) 30 MG tablet Take 30 mg by mouth every day. Indications: High Blood Pressure Disorder   • buPROPion SR (WELLBUTRIN-SR) 100 MG TABLET SR 12 HR Take 300 mg by mouth every day. Indications: Depressive Phase of Manic-Depression   • amlodipine (NORVASC) 5 MG Tab Take 5 mg by mouth every day. Indications: High Blood Pressure

## 2021-07-23 NOTE — ED TRIAGE NOTES
Herman Parish Chiang  55 y.o. male  Chief Complaint   Patient presents with   • Bloody Stools     Intermittent black stools onset 08/2020. Exacerbation this AM. Pt complains of diarrhea, dizziness, and fatigue. Pt previously seen in [facility] for same complaint.       Pt ambulatory to triage with steady gait for above complaint.   Pt is alert and oriented, speaking in full sentences, follows commands and responds appropriately to questions. Resp are even and unlabored. Pt placed in lobby. Pt educated on triage process. Pt encouraged to alert staff for any changes. This RN masked and in appropriate PPE during encounter.

## 2021-07-23 NOTE — ED PROVIDER NOTES
ED Provider Note    CHIEF COMPLAINT  Chief Complaint   Patient presents with   • Bloody Stools     Intermittent black stools onset 08/2020. Exacerbation this AM. Pt complains of diarrhea, dizziness, and fatigue. Pt previously seen in [place] for same complaint.       HPI  Herman Chiang is a 55 y.o. male who presents for evaluation of black stools over the past year with lower abdominal pain.  He states that the stools seem to be intermittently black.  The abdominal pain is increasingly present and increasingly severe.  Has been evaluated by an emergency department and states that he had a CT scan and ultrasound and blood work that was unremarkable.  He followed up with his primary care physician but reports that his primary care physician did not want him to go see a gastroenterologist.  The symptoms have been returning.  He has been taking Prevacid.  He has a history of basal cell carcinoma, denies any other medical problems.  He reports that although he has not vomited he sometimes gags and feels though he really is not eating and drinking well.  Has no chest pain or shortness of breath, no fever, he offers no other specific complaints at this time.    REVIEW OF SYSTEMS  Negative for fever, rash, chest pain, dyspnea, headache, back pain. All other systems are negative.     PAST MEDICAL HISTORY  Past Medical History:   Diagnosis Date   • Cancer (HCC)        FAMILY HISTORY  History reviewed. No pertinent family history.    SOCIAL HISTORY  Social History     Tobacco Use   • Smoking status: Current Every Day Smoker     Packs/day: 0.50     Types: Cigarettes   • Smokeless tobacco: Never Used   Vaping Use   • Vaping Use: Some days   • Substances: THC   Substance Use Topics   • Alcohol use: Yes     Comment: occ   • Drug use: Yes     Comment: thc       SURGICAL HISTORY  History reviewed. No pertinent surgical history.    CURRENT MEDICATIONS  I personally reviewed the medication list in the charting documentation.  "    ALLERGIES  Allergies   Allergen Reactions   • Abilify      Flu like sx about 4 years ago   • Risperidone      Flu like sx 4 years ago       MEDICAL RECORD  I have reviewed patient's medical record and pertinent results are listed above.      PHYSICAL EXAM  VITAL SIGNS: /88   Pulse 92   Temp 36.3 °C (97.4 °F) (Temporal)   Resp 14   Ht 1.651 m (5' 5\")   Wt 82 kg (180 lb 12.4 oz)   SpO2 97%   BMI 30.08 kg/m²    Constitutional: Well appearing patient in no acute distress.  Awake and alert, not toxic nor ill in appearance.  HENT: Normocephalic, no obvious evidence of acute trauma.  Eyes: No scleral icterus. Normal conjunctiva   Neck: Comfortable movement without any obvious restriction in the range of motion.  Cardiovascular: Upon ascultation I appreciate a regular heart rhythm and a normal rate.   Thorax & Lungs: Normal nonlabored respirations.  Upon application of the stethoscope for auscultation I find there to be no associated chest wall tenderness.  I appreciate no wheezing, rhonchi or rales. There is normal air movement.    Abdomen: The abdomen is not visibly distended.  Upon palpation he has mild generalized tenderness that seems to be increasingly severe in the lower portions of his abdomen bilaterally.  No rebound, no guarding  Rectal: No gross blood.  No stool in the vault, minimal stool noted on the gloved finger, no obvious blood.  Skin: The exposed portions of skin reveal generally dry scaling skin.  Scars noted on both arms.  Extremities/Musculoskeletal: No obvious sign of acute trauma. No asymmetric calf tenderness or edema.   Neurologic: Alert & oriented. No focal deficits observed.   Psychiatric: Normal affect appropriate for the clinical situation.    DIAGNOSTIC STUDIES / PROCEDURES    LABS/EKGs  Results for orders placed or performed during the hospital encounter of 07/23/21   COD (ADULT)   Result Value Ref Range    ABO Grouping Only A     Rh Grouping Only POS     Antibody Screen-Cod " NEG    CBC WITH DIFFERENTIAL   Result Value Ref Range    WBC 13.1 (H) 4.8 - 10.8 K/uL    RBC 5.12 4.70 - 6.10 M/uL    Hemoglobin 15.9 14.0 - 18.0 g/dL    Hematocrit 46.0 42.0 - 52.0 %    MCV 89.8 81.4 - 97.8 fL    MCH 31.1 27.0 - 33.0 pg    MCHC 34.6 33.7 - 35.3 g/dL    RDW 47.7 35.9 - 50.0 fL    Platelet Count 293 164 - 446 K/uL    MPV 10.3 9.0 - 12.9 fL    Neutrophils-Polys 75.10 (H) 44.00 - 72.00 %    Lymphocytes 15.40 (L) 22.00 - 41.00 %    Monocytes 7.20 0.00 - 13.40 %    Eosinophils 0.80 0.00 - 6.90 %    Basophils 0.80 0.00 - 1.80 %    Immature Granulocytes 0.70 0.00 - 0.90 %    Nucleated RBC 0.00 /100 WBC    Neutrophils (Absolute) 9.87 (H) 1.82 - 7.42 K/uL    Lymphs (Absolute) 2.02 1.00 - 4.80 K/uL    Monos (Absolute) 0.95 (H) 0.00 - 0.85 K/uL    Eos (Absolute) 0.10 0.00 - 0.51 K/uL    Baso (Absolute) 0.11 0.00 - 0.12 K/uL    Immature Granulocytes (abs) 0.09 0.00 - 0.11 K/uL    NRBC (Absolute) 0.00 K/uL   COMP METABOLIC PANEL   Result Value Ref Range    Sodium 133 (L) 135 - 145 mmol/L    Potassium 4.4 3.6 - 5.5 mmol/L    Chloride 97 96 - 112 mmol/L    Co2 19 (L) 20 - 33 mmol/L    Anion Gap 17.0 (H) 7.0 - 16.0    Glucose 85 65 - 99 mg/dL    Bun 11 8 - 22 mg/dL    Creatinine 1.10 0.50 - 1.40 mg/dL    Calcium 9.1 8.5 - 10.5 mg/dL    AST(SGOT) 29 12 - 45 U/L    ALT(SGPT) 25 2 - 50 U/L    Alkaline Phosphatase 70 30 - 99 U/L    Total Bilirubin 0.4 0.1 - 1.5 mg/dL    Albumin 4.8 3.2 - 4.9 g/dL    Total Protein 7.4 6.0 - 8.2 g/dL    Globulin 2.6 1.9 - 3.5 g/dL    A-G Ratio 1.8 g/dL   LIPASE   Result Value Ref Range    Lipase 53 11 - 82 U/L   PROTHROMBIN TIME   Result Value Ref Range    PT 12.3 12.0 - 14.6 sec    INR 0.94 0.87 - 1.13   APTT   Result Value Ref Range    APTT 27.0 24.7 - 36.0 sec   ESTIMATED GFR   Result Value Ref Range    GFR If African American >60 >60 mL/min/1.73 m 2    GFR If Non African American >60 >60 mL/min/1.73 m 2        RADIOLOGY  DX-CHEST-PORTABLE (1 VIEW)   Final Result      No acute cardiac  or pulmonary abnormalities are identified.            COURSE & MEDICAL DECISION MAKING  I have reviewed any medical record information, laboratory studies and radiographic results as noted above.  Differential diagnoses includes: Gastritis/PUD, blood loss anemia, dehydration, electrolyte abnormalities, hepatitis, pancreatitis    Encounter Summary: This is a very pleasant 55 y.o. male who unfortunately required evaluation in the emergency department today with lower abdominal pain and black stool over the past year, has had evaluations by his PCP in emergency room without etiology, he would really like to follow-up with a gastroenterologist.  He states that recently has been eating and drinking less.  On exam he has no evidence of peritonitis, rectal exam did not reveal gross blood, really did not get much of a stool sample but I did send the car down the lab for testing.  He will receive some IV fluids he states he has been eating and drinking much and has tachycardia.  His triage ordered blood work reveals a normal hemoglobin which is reassuring in the setting of potential blood loss for the past year.  He will be referred to the gastroenterologist, strict return instructions provided      DISPOSITION: Discharged home in stable condition      FINAL IMPRESSION  1. Lower abdominal pain    2. Rectal bleeding           This dictation was created using voice recognition software. The accuracy of the dictation is limited to the abilities of the software. I expect there may be some errors of grammar and possibly content. The nursing notes were reviewed and certain aspects of this information were incorporated into this note.    Electronically signed by: Favian Thomas M.D., 7/23/2021 3:13 PM

## 2021-07-24 NOTE — ED NOTES
Pt provided with discharge instructions. Pt had no further questions. Pt ambulated to [facility]

## 2021-07-27 ENCOUNTER — TELEPHONE (OUTPATIENT)
Dept: SCHEDULING | Facility: IMAGING CENTER | Age: 56
End: 2021-07-27

## 2021-08-04 ENCOUNTER — OFFICE VISIT (OUTPATIENT)
Dept: MEDICAL GROUP | Facility: CLINIC | Age: 56
End: 2021-08-04
Payer: MEDICAID

## 2021-08-04 VITALS
OXYGEN SATURATION: 96 % | HEIGHT: 65 IN | DIASTOLIC BLOOD PRESSURE: 78 MMHG | SYSTOLIC BLOOD PRESSURE: 132 MMHG | RESPIRATION RATE: 14 BRPM | WEIGHT: 182 LBS | BODY MASS INDEX: 30.32 KG/M2 | HEART RATE: 63 BPM | TEMPERATURE: 98.1 F

## 2021-08-04 DIAGNOSIS — R10.31 CHRONIC BILATERAL LOWER ABDOMINAL PAIN: ICD-10-CM

## 2021-08-04 DIAGNOSIS — G89.29 CHRONIC BILATERAL LOWER ABDOMINAL PAIN: ICD-10-CM

## 2021-08-04 DIAGNOSIS — C44.612 BASAL CELL CARCINOMA (BCC) OF SKIN OF RIGHT UPPER EXTREMITY INCLUDING SHOULDER: ICD-10-CM

## 2021-08-04 DIAGNOSIS — R10.32 CHRONIC BILATERAL LOWER ABDOMINAL PAIN: ICD-10-CM

## 2021-08-04 DIAGNOSIS — C44.619 BASAL CELL CARCINOMA (BCC) OF SKIN OF LEFT UPPER EXTREMITY INCLUDING SHOULDER: ICD-10-CM

## 2021-08-04 DIAGNOSIS — E78.2 MIXED HYPERLIPIDEMIA: ICD-10-CM

## 2021-08-04 DIAGNOSIS — I10 ESSENTIAL HYPERTENSION: ICD-10-CM

## 2021-08-04 DIAGNOSIS — Q80.1 ICHTHYOSIS, X-LINKED: ICD-10-CM

## 2021-08-04 DIAGNOSIS — E55.9 VITAMIN D DEFICIENCY: ICD-10-CM

## 2021-08-04 PROBLEM — C44.611 BASAL CELL CARCINOMA (BCC) OF SKIN OF UPPER EXTREMITY INCLUDING SHOULDER: Status: ACTIVE | Noted: 2021-08-04

## 2021-08-04 PROCEDURE — 99204 OFFICE O/P NEW MOD 45 MIN: CPT | Performed by: PHYSICIAN ASSISTANT

## 2021-08-04 RX ORDER — BUPROPION HYDROCHLORIDE 300 MG/1
300 TABLET ORAL
COMMUNITY
Start: 2021-06-19 | End: 2021-10-14 | Stop reason: SDUPTHER

## 2021-08-04 RX ORDER — LISINOPRIL 20 MG/1
20 TABLET ORAL
COMMUNITY
Start: 2021-07-10 | End: 2021-08-04

## 2021-08-04 RX ORDER — SODIUM CHLORIDE 9 MG/ML
INJECTION, SOLUTION INTRAVENOUS
COMMUNITY
Start: 2021-07-23 | End: 2021-08-04

## 2021-08-04 RX ORDER — PANTOPRAZOLE SODIUM 40 MG/1
40 TABLET, DELAYED RELEASE ORAL
COMMUNITY
Start: 2021-07-26 | End: 2021-10-14 | Stop reason: SDUPTHER

## 2021-08-04 RX ORDER — OMEPRAZOLE 40 MG/1
40 CAPSULE, DELAYED RELEASE ORAL
COMMUNITY
Start: 2021-06-26 | End: 2021-08-04

## 2021-08-04 ASSESSMENT — ENCOUNTER SYMPTOMS
NAUSEA: 0
PSYCHIATRIC NEGATIVE: 1
DIARRHEA: 1
ABDOMINAL PAIN: 1
CONSTIPATION: 0
RESPIRATORY NEGATIVE: 1
NEUROLOGICAL NEGATIVE: 1
CARDIOVASCULAR NEGATIVE: 1
MUSCULOSKELETAL NEGATIVE: 1
BLOOD IN STOOL: 1
HEARTBURN: 1
VOMITING: 0
EYES NEGATIVE: 1
CONSTITUTIONAL NEGATIVE: 1

## 2021-08-04 ASSESSMENT — VISUAL ACUITY: OU: 1

## 2021-08-04 ASSESSMENT — FIBROSIS 4 INDEX: FIB4 SCORE: 1.05

## 2021-08-04 NOTE — PROGRESS NOTES
Subjective:      Herman Chiang is a 55 y.o. male who presents with Establish Care and Abdominal Pain (has apt with Gastro 8/13/21)       1. Ichthyosis, X-linked  Chronic condition for this patient.  Not currently on any medications.  Patient needs to establish with a new dermatologist.  - REFERRAL TO DERMATOLOGY    2. Basal cell carcinoma (BCC) of skin of right upper extremity including shoulder  Chronic condition.  Patient has had multiple lesions removed off of his upper body including both arms.  Multiple surgical scars from having basal cells removed.  Needs to establish with a dermatologist for complete skin check and continued treatment of his skin conditions.  - REFERRAL TO DERMATOLOGY    3. Basal cell carcinoma (BCC) of skin of left upper extremity including shoulder    - REFERRAL TO DERMATOLOGY    4. Chronic bilateral lower abdominal pain  Chronic condition that has been going on for approximately 10 months.  Started out with bilateral lower abdominal pain.  States he was taking generic omeprazole and he developed black tarry stools and a lump in his mid abdomen just below his bellybutton.  He had a CT scan done of his abdomen on 2/2/2021 that showed extensive wall thickening in the cecum and right colon and mild wall thickening in transverse and descending colon.  It also showed mild inflammatory changes along the right paracolic gutter.  His white count was also elevated at 13.8 during that same time.  He was diagnosed with acute colitis most likely infectious due to elevated neutrophils.  He was treated with antibiotics and sent home and told to follow-up with his primary.  He followed up as requested and requested to be sent to gastroenterology but the referral was never placed.  Patient is here today to establish care and request follow on care after gastroenterology.  He has an appointment [facility] on 8/13/2021.  He will have their clinic notes sent over to our office.  We  will follow-up with him after this appointment.    5. Essential hypertension  The patient's chronic condition is well controlled on the current therapy with no new symptoms or worsening.  Blood pressure in the office today is 132/78. He currently takes lisinopril 30 mg daily and amlodipine 5 mg daily he will continue medications as prescribed.    6. Mixed hyperlipidemia  The patient's chronic condition is well controlled on the current therapy with no new symptoms or worsening.  Currently takes rosuvastatin 10 mg nightly.  He denies any side effects such as muscle aches and cramping.  He will continue on this medication at its current dose.  No refills needed at this time.    7. Vitamin D deficiency  Patient is currently being treated with supplemental vitamin D for measured deficiency. Taking extra vitamin D in addition to trying to include more in diet. No current side effects or issues.  Currently taking vitamin D3 5000 units daily.    Past Medical History:  No date: Allergy  No date: Anxiety  No date: Cancer ([facility])  2012: Cataract      Comment:  bilateral removal  No date: Depression  No date: GERD (gastroesophageal reflux disease)  No date: Hyperlipidemia  No date: Hypertension  No date: IBD (inflammatory bowel disease)      Comment:  diarrhea  No date: Kidney disease  No date: Substance abuse ([facility])      Comment:  Crack - clean since 2004  Past Surgical History:  2012: EYE SURGERY; Bilateral      Comment:  cataracts  No date: OTHER SURGICAL PROCEDURE; Bilateral      Comment:  BCCA skin multiple cites  Social History    Tobacco Use      Smoking status: Current Every Day Smoker        Packs/day: 1.50        Years: 40.00        Pack years: 60        Types: Cigarettes      Smokeless tobacco: Never Used      Tobacco comment: Max was 4 PPD for 5 years    Vaping Use      Vaping Use: Some days        Start date: 2/9/2021        Substances: THC        Devices: Disposable    Alcohol use: Yes      Alcohol/week: 12.6 oz       Types: 21 Cans of beer per week      Comment: 3 a day    Drug use: Yes      Types: Marijuana, Inhaled      Comment: thc vape    Review of patient's family history indicates:  Problem: Hypertension      Relation: Mother          Age of Onset: (Not Specified)  Problem: Cancer      Relation: Father          Age of Onset: (Not Specified)          Comment: Nodular lymphoma  Problem: Arthritis      Relation: Brother          Age of Onset: (Not Specified)  Problem: Dementia      Relation: Maternal Grandmother          Age of Onset: (Not Specified)  Problem: Stroke      Relation: Maternal Grandmother          Age of Onset: (Not Specified)  Problem: Cancer      Relation: Maternal Grandfather          Age of Onset: (Not Specified)          Comment: lung  Problem: Heart Attack      Relation: Paternal Grandmother          Age of Onset: (Not Specified)  Problem: Heart Disease      Relation: Paternal Grandmother          Age of Onset: (Not Specified)  Problem: Hypertension      Relation: Paternal Grandmother          Age of Onset: (Not Specified)  Problem: Hyperlipidemia      Relation: Paternal Grandmother          Age of Onset: (Not Specified)  Problem: No Known Problems      Relation: Paternal Grandfather          Age of Onset: (Not Specified)  Problem: No Known Problems      Relation: Brother          Age of Onset: (Not Specified)  Problem: Diabetes      Relation: Neg Hx          Age of Onset: (Not Specified)      Current Outpatient Medications: •  pantoprazole (PROTONIX) 40 MG Tablet Delayed Response, Take 40 mg by mouth every day., Disp: , Rfl: •  buPROPion (WELLBUTRIN XL) 300 MG XL tablet, Take 300 mg by mouth every day., Disp: , Rfl: •  Cholecalciferol (VITAMIN D3) 125 MCG (5000 UT) Cap, Take 1 capsule by mouth every day., Disp: , Rfl: •  gabapentin (NEURONTIN) 300 MG Cap, Take 300 mg by mouth 3 times a day. Indications: Neuropathic Pain, Disp: , Rfl: •  rosuvastatin (CRESTOR) 10 MG Tab, Take 10 mg by mouth every evening.  "Indications: Disease of the Heart and Blood Vessels, Disp: , Rfl: •  divalproex (DEPAKOTE) 250 MG Tablet Delayed Response, Take 500 mg by mouth 2 times a day., Disp: , Rfl: •  lisinopril (PRINIVIL) 30 MG tablet, Take 30 mg by mouth every day. Indications: High Blood Pressure Disorder, Disp: , Rfl: •  amlodipine (NORVASC) 5 MG Tab, Take 5 mg by mouth every day. Indications: High Blood Pressure, Disp: , Rfl:     Patient was instructed on the use of medications, either prescriptions or OTC and informed on when the appropriate follow up time period should be. In addition, patient was also instructed that should any acute worsening occur that they should notify this clinic asap or call 911.      Review of Systems   Constitutional: Negative.    HENT: Negative.    Eyes: Negative.    Respiratory: Negative.    Cardiovascular: Negative.    Gastrointestinal: Positive for abdominal pain, blood in stool, diarrhea and heartburn. Negative for constipation, melena, nausea and vomiting.   Genitourinary: Negative.    Musculoskeletal: Negative.    Skin:        Patient has X-linked ichthyosis.   Neurological: Negative.    Endo/Heme/Allergies: Negative.    Psychiatric/Behavioral: Negative.       Objective:     /78 (BP Location: Left arm, Patient Position: Sitting, BP Cuff Size: Adult)   Pulse 63   Temp 36.7 °C (98.1 °F) (Temporal)   Resp 14   Ht 1.651 m (5' 5\")   Wt 82.6 kg (182 lb)   SpO2 96%   BMI 30.29 kg/m²      Physical Exam  Constitutional:       Appearance: Normal appearance. He is well-developed and well-groomed. He is not ill-appearing.   HENT:      Head: Normocephalic and atraumatic.      Nose: Nose normal.      Mouth/Throat:      Lips: Pink. No lesions.      Mouth: Mucous membranes are moist.   Eyes:      General: Lids are normal. Vision grossly intact. Gaze aligned appropriately. No scleral icterus.     Extraocular Movements: Extraocular movements intact.      Conjunctiva/sclera: Conjunctivae normal.      " Pupils: Pupils are equal, round, and reactive to light.   Neck:      Thyroid: No thyromegaly.      Vascular: No carotid bruit or JVD.      Trachea: Trachea and phonation normal.   Cardiovascular:      Rate and Rhythm: Normal rate and regular rhythm.      Pulses: Normal pulses.      Heart sounds: Normal heart sounds. No murmur heard.   No friction rub. No gallop.    Pulmonary:      Effort: Pulmonary effort is normal.      Breath sounds: Normal breath sounds. No wheezing, rhonchi or rales.   Abdominal:      General: Abdomen is protuberant. Bowel sounds are normal. There is no distension or abdominal bruit. There are no signs of injury.      Palpations: Abdomen is soft.      Tenderness: There is abdominal tenderness in the right lower quadrant, suprapubic area and left lower quadrant. There is no right CVA tenderness, left CVA tenderness, guarding or rebound.      Hernia: No hernia is present.   Musculoskeletal:         General: Normal range of motion.      Cervical back: Normal range of motion and neck supple.      Right lower leg: No edema.      Left lower leg: No edema.   Lymphadenopathy:      Cervical: No cervical adenopathy.   Skin:     General: Skin is warm and dry.      Capillary Refill: Capillary refill takes less than 2 seconds.      Findings: Lesion present. No rash.      Comments: Patient has X linked ichthyosis and a history of multiple basal cell carcinoma on upper torso, neck and bilateral arms.  Multiple surgical scars noted on bilateral forearms.  Skin is very thick, dry and scaly.   Neurological:      General: No focal deficit present.      Mental Status: He is alert and oriented to person, place, and time.      Cranial Nerves: Cranial nerves are intact.   Psychiatric:         Attention and Perception: Attention and perception normal.         Mood and Affect: Mood and affect normal.         Speech: Speech normal.         Behavior: Behavior normal. Behavior is cooperative.         Thought Content:  Thought content normal.         Judgment: Judgment normal.          Assessment/Plan:      1. Ichthyosis, X-linked    - REFERRAL TO DERMATOLOGY    2. Basal cell carcinoma (BCC) of skin of right upper extremity including shoulder    - REFERRAL TO DERMATOLOGY    3. Basal cell carcinoma (BCC) of skin of left upper extremity including shoulder    - REFERRAL TO DERMATOLOGY    4. Chronic bilateral lower abdominal pain      5. Essential hypertension      6. Mixed hyperlipidemia      7. Vitamin D deficiency

## 2021-08-04 NOTE — ASSESSMENT & PLAN NOTE
Stared with lower abdominal pain, black stool and a palpable lump in the lower abdomen.   Has an appointment with digestive health on 08/13/21.

## 2021-10-05 ENCOUNTER — TELEPHONE (OUTPATIENT)
Dept: MEDICAL GROUP | Facility: CLINIC | Age: 56
End: 2021-10-05

## 2021-10-05 NOTE — TELEPHONE ENCOUNTER
Ankur Borges in [place] gave verbal order for pantoprazole 40 mg. Informed for ICD 10 code K21.9.

## 2021-10-14 ENCOUNTER — OFFICE VISIT (OUTPATIENT)
Dept: MEDICAL GROUP | Facility: CLINIC | Age: 56
End: 2021-10-14
Payer: MEDICAID

## 2021-10-14 VITALS
WEIGHT: 189 LBS | DIASTOLIC BLOOD PRESSURE: 68 MMHG | RESPIRATION RATE: 12 BRPM | HEART RATE: 74 BPM | OXYGEN SATURATION: 97 % | HEIGHT: 65 IN | BODY MASS INDEX: 31.49 KG/M2 | TEMPERATURE: 97.3 F | SYSTOLIC BLOOD PRESSURE: 110 MMHG

## 2021-10-14 DIAGNOSIS — M54.42 CHRONIC MIDLINE LOW BACK PAIN WITH BILATERAL SCIATICA: ICD-10-CM

## 2021-10-14 DIAGNOSIS — E78.2 MIXED HYPERLIPIDEMIA: ICD-10-CM

## 2021-10-14 DIAGNOSIS — E66.9 OBESITY (BMI 30-39.9): ICD-10-CM

## 2021-10-14 DIAGNOSIS — M54.41 CHRONIC MIDLINE LOW BACK PAIN WITH BILATERAL SCIATICA: ICD-10-CM

## 2021-10-14 DIAGNOSIS — C44.619 BASAL CELL CARCINOMA (BCC) OF SKIN OF LEFT UPPER EXTREMITY INCLUDING SHOULDER: ICD-10-CM

## 2021-10-14 DIAGNOSIS — E55.9 VITAMIN D DEFICIENCY: ICD-10-CM

## 2021-10-14 DIAGNOSIS — F31.62 BIPOLAR DISORDER, CURRENT EPISODE MIXED, MODERATE (HCC): ICD-10-CM

## 2021-10-14 DIAGNOSIS — K21.9 GASTROESOPHAGEAL REFLUX DISEASE WITHOUT ESOPHAGITIS: ICD-10-CM

## 2021-10-14 DIAGNOSIS — Z23 NEED FOR VACCINATION: ICD-10-CM

## 2021-10-14 DIAGNOSIS — G89.29 CHRONIC MIDLINE LOW BACK PAIN WITH BILATERAL SCIATICA: ICD-10-CM

## 2021-10-14 DIAGNOSIS — Q80.1 ICHTHYOSIS, X-LINKED: ICD-10-CM

## 2021-10-14 DIAGNOSIS — I10 ESSENTIAL HYPERTENSION: ICD-10-CM

## 2021-10-14 PROBLEM — C44.611 BASAL CELL CARCINOMA (BCC) OF SKIN OF UPPER EXTREMITY INCLUDING SHOULDER: Status: RESOLVED | Noted: 2021-08-04 | Resolved: 2021-10-14

## 2021-10-14 PROCEDURE — 90686 IIV4 VACC NO PRSV 0.5 ML IM: CPT | Performed by: PHYSICIAN ASSISTANT

## 2021-10-14 PROCEDURE — 90471 IMMUNIZATION ADMIN: CPT | Performed by: PHYSICIAN ASSISTANT

## 2021-10-14 PROCEDURE — 99214 OFFICE O/P EST MOD 30 MIN: CPT | Mod: 25 | Performed by: PHYSICIAN ASSISTANT

## 2021-10-14 RX ORDER — AMLODIPINE BESYLATE 5 MG/1
5 TABLET ORAL DAILY
Qty: 90 TABLET | Refills: 3 | Status: SHIPPED | OUTPATIENT
Start: 2021-10-14 | End: 2022-11-01 | Stop reason: SDUPTHER

## 2021-10-14 RX ORDER — GABAPENTIN 300 MG/1
300 CAPSULE ORAL 3 TIMES DAILY
Qty: 270 CAPSULE | Refills: 3 | Status: SHIPPED | OUTPATIENT
Start: 2021-10-14 | End: 2022-11-01 | Stop reason: SDUPTHER

## 2021-10-14 RX ORDER — BUPROPION HYDROCHLORIDE 300 MG/1
300 TABLET ORAL
Qty: 90 TABLET | Refills: 3 | Status: SHIPPED | OUTPATIENT
Start: 2021-10-14 | End: 2022-11-04 | Stop reason: SDUPTHER

## 2021-10-14 RX ORDER — ROSUVASTATIN CALCIUM 10 MG/1
10 TABLET, COATED ORAL EVERY EVENING
Qty: 90 TABLET | Refills: 3 | Status: SHIPPED | OUTPATIENT
Start: 2021-10-14 | End: 2023-11-14 | Stop reason: SDUPTHER

## 2021-10-14 RX ORDER — LISINOPRIL 30 MG/1
30 TABLET ORAL DAILY
Qty: 90 TABLET | Refills: 3 | Status: SHIPPED | OUTPATIENT
Start: 2021-10-14 | End: 2022-11-01 | Stop reason: SDUPTHER

## 2021-10-14 RX ORDER — PANTOPRAZOLE SODIUM 40 MG/1
40 TABLET, DELAYED RELEASE ORAL
Qty: 90 TABLET | Refills: 3 | Status: SHIPPED | OUTPATIENT
Start: 2021-10-14 | End: 2022-10-19 | Stop reason: SDUPTHER

## 2021-10-14 RX ORDER — DIVALPROEX SODIUM 250 MG/1
500 TABLET, DELAYED RELEASE ORAL 2 TIMES DAILY
Qty: 360 TABLET | Refills: 3 | Status: SHIPPED | OUTPATIENT
Start: 2021-10-14 | End: 2022-12-02 | Stop reason: SDUPTHER

## 2021-10-14 ASSESSMENT — FIBROSIS 4 INDEX: FIB4 SCORE: 1.05

## 2021-10-14 NOTE — ASSESSMENT & PLAN NOTE
Has an active lesion on Left forearm approximately 1 cm X 2 cm. Has been putting antibiotic ointment on it and covering with a bandaid during the day to try and prevent another staph infection like he had previously. Has appointment with dermatology on 12/01/21 for further evaluation.

## 2021-10-25 PROBLEM — I70.0 ATHEROSCLEROSIS OF ABDOMINAL AORTA (HCC): Status: ACTIVE | Noted: 2021-10-25

## 2021-10-25 PROBLEM — K76.0 HEPATIC STEATOSIS: Status: ACTIVE | Noted: 2021-10-25

## 2021-10-25 PROBLEM — G89.29 CHRONIC MIDLINE LOW BACK PAIN WITH BILATERAL SCIATICA: Status: ACTIVE | Noted: 2021-10-25

## 2021-10-25 PROBLEM — K52.9 COLITIS: Status: ACTIVE | Noted: 2021-02-02

## 2021-10-25 PROBLEM — K40.20 BILATERAL INGUINAL HERNIA: Status: ACTIVE | Noted: 2021-02-02

## 2021-10-25 PROBLEM — M54.42 CHRONIC MIDLINE LOW BACK PAIN WITH BILATERAL SCIATICA: Status: ACTIVE | Noted: 2021-10-25

## 2021-10-25 PROBLEM — M54.41 CHRONIC MIDLINE LOW BACK PAIN WITH BILATERAL SCIATICA: Status: ACTIVE | Noted: 2021-10-25

## 2021-10-25 ASSESSMENT — ENCOUNTER SYMPTOMS
CONSTIPATION: 0
DIARRHEA: 0
NECK PAIN: 0
PSYCHIATRIC NEGATIVE: 1
CONSTITUTIONAL NEGATIVE: 1
BLOOD IN STOOL: 0
RESPIRATORY NEGATIVE: 1
FALLS: 0
BACK PAIN: 1
EYES NEGATIVE: 1
ABDOMINAL PAIN: 1
NEUROLOGICAL NEGATIVE: 1
HEARTBURN: 1
MYALGIAS: 0
CARDIOVASCULAR NEGATIVE: 1
NAUSEA: 0
VOMITING: 0

## 2021-10-25 ASSESSMENT — VISUAL ACUITY: OU: 1

## 2021-10-25 NOTE — PROGRESS NOTES
"Subjective   Herman Chiang is a 55 y.o. male who presents with GI Problem (fv) and Medication Refill    1. Ichthyosis, X-linked  Chronic condition. States that when he lived in [facility] that he would be able to get the condition under some control and be able to soften the skin \"enough to look care home normal\". States that since he has been in [place] he is having significantly more problems maintaining moisture levels in the skin. Have gone over some recommendations that may help some. He states that he will give these a try.He is requesting a referral to dermatology for further evaluation.     2. Basal cell carcinoma (BCC) of skin of left upper extremity including shoulder  Skin is very dry and scaly. Difficult to distinguish skin lesions until they start to bleed.  Has a history of having a few very large ones removed in the past. He now has a new lesion developing on his left forearm. He states that he noticed it getting bigger and then start to scab over. He states that this is the same thing that happened with the last one and that he let it go too long before. Would like referral to derm for further evaluation and biopsy if needed.    3. Essential hypertension  Currently treated for hypertension, taking medications as prescribed.  Denies chest pain or shortness of breath. Controlled.  Requesting refills.  - lisinopril (PRINIVIL) 30 MG tablet; Take 1 Tablet by mouth every day. Indications: High Blood Pressure Disorder  Dispense: 90 Tablet; Refill: 3  - amLODIPine (NORVASC) 5 MG Tab; Take 1 Tablet by mouth every day. Indications: High Blood Pressure Disorder  Dispense: 90 Tablet; Refill: 3    4. Mixed hyperlipidemia  Currently treated for hyperlipidemia, taking meds with no new myalgias or joint pain, watching fats in diet. Controlled  - rosuvastatin (CRESTOR) 10 MG Tab; Take 1 Tablet by mouth every evening. Indications: Disease of the Heart or Blood Vessels  Dispense: 90 Tablet; Refill: 3    5. Vitamin D " deficiency  Patient is currently being treated with supplemental vitamin D for measured deficiency. Taking extra vitamin D in addition to trying to include more in diet. No current side effects or issues  - Cholecalciferol (VITAMIN D3) 125 MCG (5000 UT) Cap; Take 1 Capsule by mouth every day.  Dispense: 90 Capsule; Refill: 3    6. Gastroesophageal reflux disease without esophagitis  Patient is currently being treated for gerd, taking meds with no new symptoms or side effects, is trying to modify diet with decreased acidic foods, caffeine, and alcohol.  Controlled  - pantoprazole (PROTONIX) 40 MG Tablet Delayed Response; Take 1 Tablet by mouth every day.  Dispense: 90 Tablet; Refill: 3    7. Bipolar disorder, current episode mixed, moderate (HCC)  Chronic condition that is well controlled on current medications. Requesting refills of meds today.  - buPROPion (WELLBUTRIN XL) 300 MG XL tablet; Take 1 Tablet by mouth every day.  Dispense: 90 Tablet; Refill: 3  - divalproex (DEPAKOTE) 250 MG Tablet Delayed Response; Take 2 Tablets by mouth 2 times a day.  Dispense: 360 Tablet; Refill: 3    8. Chronic midline low back pain with bilateral sciatica  The patient's current medical issue is well controlled on the current therapy with no new symptoms or worsening. Requesting refill of medication today  - gabapentin (NEURONTIN) 300 MG Cap; Take 1 Capsule by mouth 3 times a day. Indications: Neuropathic Pain  Dispense: 270 Capsule; Refill: 3    9. Obesity (BMI 30-39.9)  Body mass index is 31.45 kg/m². Patient has been diagnosed with obesity and again has been counseled on caloric and carbohydrate restriction along with increasing exercise to 30 minutes 5 or more times a week.  - Patient identified as having weight management issue.  Appropriate orders and counseling given.    10. Need for vaccination  Patient due for annual influenza vaccine. Given in clinic today without adverse reaction.  - INFLUENZA VACCINE QUAD INJ  (PF)    Past Medical History:  4/2/2016: MADDISON (acute kidney injury) (HCC)  No date: Allergy  No date: Anxiety  No date: Cancer (HCC)  2012: Cataract      Comment:  bilateral removal  No date: Chronic midline low back pain with bilateral sciatica  4/2/2016: Dehydration  No date: Depression  No date: GERD (gastroesophageal reflux disease)  No date: Hyperlipidemia  No date: Hypertension  No date: IBD (inflammatory bowel disease)      Comment:  diarrhea  No date: Kidney disease  No date: Substance abuse ([facility])      Comment:  Crack - clean since 2004  Past Surgical History:  2012: EYE SURGERY; Bilateral      Comment:  cataracts  No date: OTHER SURGICAL PROCEDURE; Bilateral      Comment:  BCCA skin multiple cites  Social History    Tobacco Use      Smoking status: Current Every Day Smoker        Packs/day: 1.50        Years: 40.00        Pack years: 60        Types: Cigarettes      Smokeless tobacco: Never Used      Tobacco comment: Max was 4 PPD for 5 years    Vaping Use      Vaping Use: Some days        Start date: 2/9/2021        Substances: THC        Devices: Disposable    Alcohol use: Yes      Alcohol/week: 12.6 oz      Types: 21 Cans of beer per week      Comment: 3 a day    Drug use: Yes      Types: Marijuana, Inhaled      Comment: thc vape    Review of patient's family history indicates:  Problem: Hypertension      Relation: Mother          Age of Onset: (Not Specified)  Problem: Cancer      Relation: Father          Age of Onset: (Not Specified)          Comment: Nodular lymphoma  Problem: Arthritis      Relation: Brother          Age of Onset: (Not Specified)  Problem: Dementia      Relation: Maternal Grandmother          Age of Onset: (Not Specified)  Problem: Stroke      Relation: Maternal Grandmother          Age of Onset: (Not Specified)  Problem: Cancer      Relation: Maternal Grandfather          Age of Onset: (Not Specified)          Comment: lung  Problem: Heart Attack      Relation: Paternal  Grandmother          Age of Onset: (Not Specified)  Problem: Heart Disease      Relation: Paternal Grandmother          Age of Onset: (Not Specified)  Problem: Hypertension      Relation: Paternal Grandmother          Age of Onset: (Not Specified)  Problem: Hyperlipidemia      Relation: Paternal Grandmother          Age of Onset: (Not Specified)  Problem: No Known Problems      Relation: Paternal Grandfather          Age of Onset: (Not Specified)  Problem: No Known Problems      Relation: Brother          Age of Onset: (Not Specified)  Problem: Diabetes      Relation: Neg Hx          Age of Onset: (Not Specified)      Current Outpatient Medications: •  lisinopril (PRINIVIL) 30 MG tablet, Take 1 Tablet by mouth every day. Indications: High Blood Pressure Disorder, Disp: 90 Tablet, Rfl: 3•  gabapentin (NEURONTIN) 300 MG Cap, Take 1 Capsule by mouth 3 times a day. Indications: Neuropathic Pain, Disp: 270 Capsule, Rfl: 3•  buPROPion (WELLBUTRIN XL) 300 MG XL tablet, Take 1 Tablet by mouth every day., Disp: 90 Tablet, Rfl: 3•  amLODIPine (NORVASC) 5 MG Tab, Take 1 Tablet by mouth every day. Indications: High Blood Pressure Disorder, Disp: 90 Tablet, Rfl: 3•  rosuvastatin (CRESTOR) 10 MG Tab, Take 1 Tablet by mouth every evening. Indications: Disease of the Heart or Blood Vessels, Disp: 90 Tablet, Rfl: 3•  Cholecalciferol (VITAMIN D3) 125 MCG (5000 UT) Cap, Take 1 Capsule by mouth every day., Disp: 90 Capsule, Rfl: 3•  pantoprazole (PROTONIX) 40 MG Tablet Delayed Response, Take 1 Tablet by mouth every day., Disp: 90 Tablet, Rfl: 3•  divalproex (DEPAKOTE) 250 MG Tablet Delayed Response, Take 2 Tablets by mouth 2 times a day., Disp: 360 Tablet, Rfl: 3    Patient was instructed on the use of medications, either prescriptions or OTC and informed on when the appropriate follow up time period should be. In addition, patient was also instructed that should any acute worsening occur that they should notify this clinic asap or  "call 911.      Review of Systems   Constitutional: Negative.    HENT: Negative.    Eyes: Negative.    Respiratory: Negative.    Cardiovascular: Negative.    Gastrointestinal: Positive for abdominal pain and heartburn. Negative for blood in stool, constipation, diarrhea, melena, nausea and vomiting.   Genitourinary: Negative.    Musculoskeletal: Positive for back pain. Negative for falls, joint pain, myalgias and neck pain.   Skin: Positive for itching. Negative for rash.        Thick scaling skin.   Neurological: Negative.    Endo/Heme/Allergies: Negative.    Psychiatric/Behavioral: Negative.      Objective     /68 (BP Location: Left arm, Patient Position: Sitting, BP Cuff Size: Adult)   Pulse 74   Temp 36.3 °C (97.3 °F) (Temporal)   Resp 12   Ht 1.651 m (5' 5\") Comment: stated by pt  Wt 85.7 kg (189 lb) Comment: with shoes on  SpO2 97%   BMI 31.45 kg/m²      Physical Exam  Vitals and nursing note reviewed.   Constitutional:       Appearance: Normal appearance. He is well-developed, well-groomed and overweight.   HENT:      Head: Normocephalic and atraumatic.      Nose: Nose normal.      Mouth/Throat:      Lips: Pink. No lesions.      Mouth: Mucous membranes are moist.   Eyes:      General: Lids are normal. Vision grossly intact. Gaze aligned appropriately.      Extraocular Movements: Extraocular movements intact.      Conjunctiva/sclera: Conjunctivae normal.      Pupils: Pupils are equal, round, and reactive to light.   Neck:      Thyroid: No thyromegaly.      Vascular: No carotid bruit or JVD.      Trachea: Trachea and phonation normal.   Cardiovascular:      Rate and Rhythm: Normal rate and regular rhythm.      Heart sounds: Normal heart sounds. No murmur heard.   No friction rub. No gallop.    Pulmonary:      Effort: Pulmonary effort is normal.      Breath sounds: Normal breath sounds. No wheezing, rhonchi or rales.   Musculoskeletal:         General: Normal range of motion.      Cervical back: " Normal range of motion and neck supple.      Right lower leg: No edema.      Left lower leg: No edema.   Lymphadenopathy:      Cervical: No cervical adenopathy.   Skin:     General: Skin is warm and dry.      Capillary Refill: Capillary refill takes less than 2 seconds.      Findings: No lesion or rash.      Comments: Thick scaling skin over most body surfaces. Small scabbed lesion on Left forearm possible BCCA given patient previous history.   Neurological:      Mental Status: He is alert and oriented to person, place, and time.      Cranial Nerves: Cranial nerves are intact.   Psychiatric:         Attention and Perception: Attention and perception normal.         Mood and Affect: Mood and affect normal.         Speech: Speech normal.         Behavior: Behavior normal. Behavior is cooperative.         Thought Content: Thought content normal.         Judgment: Judgment normal.       Assessment & Plan      1. Ichthyosis, X-linked    2. Basal cell carcinoma (BCC) of skin of left upper extremity including shoulder    3. Essential hypertension  - lisinopril (PRINIVIL) 30 MG tablet; Take 1 Tablet by mouth every day. Indications: High Blood Pressure Disorder  Dispense: 90 Tablet; Refill: 3  - amLODIPine (NORVASC) 5 MG Tab; Take 1 Tablet by mouth every day. Indications: High Blood Pressure Disorder  Dispense: 90 Tablet; Refill: 3    4. Mixed hyperlipidemia  - rosuvastatin (CRESTOR) 10 MG Tab; Take 1 Tablet by mouth every evening. Indications: Disease of the Heart or Blood Vessels  Dispense: 90 Tablet; Refill: 3    5. Vitamin D deficiency  - Cholecalciferol (VITAMIN D3) 125 MCG (5000 UT) Cap; Take 1 Capsule by mouth every day.  Dispense: 90 Capsule; Refill: 3    6. Gastroesophageal reflux disease without esophagitis  - pantoprazole (PROTONIX) 40 MG Tablet Delayed Response; Take 1 Tablet by mouth every day.  Dispense: 90 Tablet; Refill: 3    7. Bipolar disorder, current episode mixed, moderate (HCC)  - buPROPion (WELLBUTRIN  XL) 300 MG XL tablet; Take 1 Tablet by mouth every day.  Dispense: 90 Tablet; Refill: 3  - divalproex (DEPAKOTE) 250 MG Tablet Delayed Response; Take 2 Tablets by mouth 2 times a day.  Dispense: 360 Tablet; Refill: 3    8. Chronic midline low back pain with bilateral sciatica  - gabapentin (NEURONTIN) 300 MG Cap; Take 1 Capsule by mouth 3 times a day. Indications: Neuropathic Pain  Dispense: 270 Capsule; Refill: 3    9. Obesity (BMI 30-39.9)  - Patient identified as having weight management issue.  Appropriate orders and counseling given.    10. Need for vaccination  - INFLUENZA VACCINE QUAD INJ (PF)

## 2021-12-17 ENCOUNTER — OFFICE VISIT (OUTPATIENT)
Dept: MEDICAL GROUP | Facility: CLINIC | Age: 56
End: 2021-12-17
Payer: MEDICAID

## 2021-12-17 VITALS
BODY MASS INDEX: 30.59 KG/M2 | SYSTOLIC BLOOD PRESSURE: 136 MMHG | HEART RATE: 80 BPM | WEIGHT: 183.6 LBS | OXYGEN SATURATION: 95 % | TEMPERATURE: 97.2 F | DIASTOLIC BLOOD PRESSURE: 78 MMHG | HEIGHT: 65 IN

## 2021-12-17 DIAGNOSIS — E78.2 MIXED HYPERLIPIDEMIA: ICD-10-CM

## 2021-12-17 DIAGNOSIS — E55.9 VITAMIN D DEFICIENCY: ICD-10-CM

## 2021-12-17 DIAGNOSIS — Z12.5 SCREENING FOR PROSTATE CANCER: ICD-10-CM

## 2021-12-17 DIAGNOSIS — K52.9 COLITIS: ICD-10-CM

## 2021-12-17 DIAGNOSIS — R79.89 ELEVATED TSH: ICD-10-CM

## 2021-12-17 DIAGNOSIS — C44.619 BASAL CELL CARCINOMA (BCC) OF SKIN OF LEFT UPPER EXTREMITY INCLUDING SHOULDER: ICD-10-CM

## 2021-12-17 DIAGNOSIS — K76.0 HEPATIC STEATOSIS: ICD-10-CM

## 2021-12-17 PROCEDURE — 99214 OFFICE O/P EST MOD 30 MIN: CPT | Performed by: PHYSICIAN ASSISTANT

## 2021-12-17 ASSESSMENT — FIBROSIS 4 INDEX: FIB4 SCORE: 1.05

## 2021-12-31 ASSESSMENT — ENCOUNTER SYMPTOMS
EYES NEGATIVE: 1
SPUTUM PRODUCTION: 0
CARDIOVASCULAR NEGATIVE: 1
COUGH: 0
PSYCHIATRIC NEGATIVE: 1
WHEEZING: 0
NEUROLOGICAL NEGATIVE: 1
HEMOPTYSIS: 0
GASTROINTESTINAL NEGATIVE: 1
MUSCULOSKELETAL NEGATIVE: 1

## 2021-12-31 ASSESSMENT — VISUAL ACUITY: OU: 1

## 2022-01-01 NOTE — PROGRESS NOTES
Subjective   Herman Chiang is a 56 y.o. male who presents with Basal Cell Carcinoma (FV on cancer ) and Body Aches (pt states feeling body aches, chest tightness since last weekend)    1. Basal cell carcinoma (BCC) of skin of left upper extremity including shoulder  Patient here today for follow up after being seen by dermatology. Patient had two new BCCA removed from his arms. He is due to follow back up with derm in a few weeks. Sites are healing well without signs of infection.    2. Mixed hyperlipidemia  Currently treated for hyperlipidemia, taking meds with no new myalgias or joint pain, watching fats in diet.  Due for routine fasting labs to check levels. Controlled   Lipid Profile; Future    3. Hepatic steatosis  Due for annual labs.   Comp Metabolic Panel; Future    4. Colitis  Was seen by GI 10/08/2021 for melena and scheduled for EGD and colonoscopy. He was diagnosed with colitis. Due for repeat labs.   CBC WITH DIFFERENTIAL; Future   Comp Metabolic Panel; Future    5. Vitamin D deficiency  Patient is currently being treated with supplemental vitamin D for measured deficiency. Taking extra vitamin D in addition to trying to include more in diet. No current side effects or issues. Taking vitamin D3 5000 units daily. Due for repeat labs.   VITAMIN D,25 HYDROXY; Future    6. Elevated TSH  Patient had an elevated TSH on previous labs. Due for repeat labs to evaluate levels.   FREE THYROXINE; Future   TSH; Future    7. Screening for prostate cancer  Due for routine screening.   PROSTATE SPECIFIC AG SCREENING; Future    Past Medical History:  4/2/2016: MADDISON (acute kidney injury) (HCC)  No date: Allergy  No date: Anxiety  No date: Cancer (HCC)  2012: Cataract      Comment:  bilateral removal  No date: Chronic midline low back pain with bilateral sciatica  4/2/2016: Dehydration  No date: Depression  No date: GERD (gastroesophageal reflux disease)  No date: Hyperlipidemia  No date: Hypertension  No date: IBD  (inflammatory bowel disease)      Comment:  diarrhea  No date: Kidney disease  No date: Substance abuse (HCC)      Comment:  Crack - clean since 2004  Past Surgical History:  2012: EYE SURGERY; Bilateral      Comment:  cataracts  No date: OTHER SURGICAL PROCEDURE; Bilateral      Comment:  BCCA skin multiple cites  Social History    Tobacco Use      Smoking status: Current Every Day Smoker        Packs/day: 1.50        Years: 40.00        Pack years: 60        Types: Cigarettes      Smokeless tobacco: Never Used      Tobacco comment: Max was 4 PPD for 5 years    Vaping Use      Vaping Use: Some days        Start date: 2/9/2021        Substances: THC        Devices: Disposable    Alcohol use: Yes      Alcohol/week: 12.6 oz      Types: 21 Cans of beer per week      Comment: 3 a day    Drug use: Yes      Types: Marijuana, Inhaled      Comment: thc vape    Review of patient's family history indicates:  Problem: Hypertension      Relation: Mother          Age of Onset: (Not Specified)  Problem: Cancer      Relation: Father          Age of Onset: (Not Specified)          Comment: Nodular lymphoma  Problem: Arthritis      Relation: Brother          Age of Onset: (Not Specified)  Problem: Dementia      Relation: Maternal Grandmother          Age of Onset: (Not Specified)  Problem: Stroke      Relation: Maternal Grandmother          Age of Onset: (Not Specified)  Problem: Cancer      Relation: Maternal Grandfather          Age of Onset: (Not Specified)          Comment: lung  Problem: Heart Attack      Relation: Paternal Grandmother          Age of Onset: (Not Specified)  Problem: Heart Disease      Relation: Paternal Grandmother          Age of Onset: (Not Specified)  Problem: Hypertension      Relation: Paternal Grandmother          Age of Onset: (Not Specified)  Problem: Hyperlipidemia      Relation: Paternal Grandmother          Age of Onset: (Not Specified)  Problem: No Known Problems      Relation: Paternal  Grandfather          Age of Onset: (Not Specified)  Problem: No Known Problems      Relation: Brother          Age of Onset: (Not Specified)  Problem: Diabetes      Relation: Neg Hx          Age of Onset: (Not Specified)      Current Outpatient Medications: •  lisinopril (PRINIVIL) 30 MG tablet, Take 1 Tablet by mouth every day. Indications: High Blood Pressure Disorder, Disp: 90 Tablet, Rfl: 3•  gabapentin (NEURONTIN) 300 MG Cap, Take 1 Capsule by mouth 3 times a day. Indications: Neuropathic Pain, Disp: 270 Capsule, Rfl: 3•  buPROPion (WELLBUTRIN XL) 300 MG XL tablet, Take 1 Tablet by mouth every day., Disp: 90 Tablet, Rfl: 3•  amLODIPine (NORVASC) 5 MG Tab, Take 1 Tablet by mouth every day. Indications: High Blood Pressure Disorder, Disp: 90 Tablet, Rfl: 3•  rosuvastatin (CRESTOR) 10 MG Tab, Take 1 Tablet by mouth every evening. Indications: Disease of the Heart or Blood Vessels, Disp: 90 Tablet, Rfl: 3•  Cholecalciferol (VITAMIN D3) 125 MCG (5000 UT) Cap, Take 1 Capsule by mouth every day., Disp: 90 Capsule, Rfl: 3•  pantoprazole (PROTONIX) 40 MG Tablet Delayed Response, Take 1 Tablet by mouth every day., Disp: 90 Tablet, Rfl: 3•  divalproex (DEPAKOTE) 250 MG Tablet Delayed Response, Take 2 Tablets by mouth 2 times a day., Disp: 360 Tablet, Rfl: 3    Patient was instructed on the use of medications, either prescriptions or OTC and informed on when the appropriate follow up time period should be. In addition, patient was also instructed that should any acute worsening occur that they should notify this clinic asap or call 911.      Review of Systems   Constitutional: Positive for malaise/fatigue.   HENT: Negative.    Eyes: Negative.    Respiratory: Negative for cough, hemoptysis, sputum production and wheezing.         Chest tightness but no real shortness of breath   Cardiovascular: Negative.    Gastrointestinal: Negative.    Genitourinary: Negative.    Musculoskeletal: Negative.    Skin: Negative for itching  "and rash.        Multiple basal cell carcinoma   Neurological: Negative.    Endo/Heme/Allergies: Negative.    Psychiatric/Behavioral: Negative.      Objective     /78 (BP Location: Left arm, Patient Position: Sitting, BP Cuff Size: Adult)   Pulse 80   Temp 36.2 °C (97.2 °F) (Temporal)   Ht 1.651 m (5' 5\")   Wt 83.3 kg (183 lb 9.6 oz)   SpO2 95%   BMI 30.55 kg/m²      Physical Exam  Vitals and nursing note reviewed.   Constitutional:       Appearance: Normal appearance. He is well-developed and well-groomed. He is obese. He is not ill-appearing or toxic-appearing.   HENT:      Head: Normocephalic and atraumatic.      Nose: Nose normal.      Mouth/Throat:      Lips: Pink. No lesions.      Mouth: Mucous membranes are moist.   Eyes:      General: Lids are normal. Vision grossly intact. Gaze aligned appropriately.      Extraocular Movements: Extraocular movements intact.      Conjunctiva/sclera: Conjunctivae normal.      Pupils: Pupils are equal, round, and reactive to light.   Neck:      Thyroid: No thyromegaly.      Vascular: No carotid bruit or JVD.      Trachea: Trachea and phonation normal.   Cardiovascular:      Rate and Rhythm: Normal rate and regular rhythm.      Heart sounds: Normal heart sounds. No murmur heard.  No friction rub. No gallop.    Pulmonary:      Effort: Pulmonary effort is normal.      Breath sounds: Normal breath sounds. No wheezing, rhonchi or rales.   Musculoskeletal:         General: Normal range of motion.      Cervical back: Normal range of motion and neck supple.      Right lower leg: No edema.      Left lower leg: No edema.   Lymphadenopathy:      Cervical: No cervical adenopathy.   Skin:     General: Skin is warm and dry.      Capillary Refill: Capillary refill takes less than 2 seconds.      Findings: No lesion or rash.      Comments: Ichthyosis with history of multiple BCCA. Two new, well healing biopsy sites on his arms.   Neurological:      Mental Status: He is alert and " oriented to person, place, and time.      Cranial Nerves: Cranial nerves are intact.   Psychiatric:         Attention and Perception: Attention and perception normal.         Mood and Affect: Mood and affect normal.         Speech: Speech normal.         Behavior: Behavior normal. Behavior is cooperative.         Thought Content: Thought content normal.         Judgment: Judgment normal.       Assessment & Plan      1. Basal cell carcinoma (BCC) of skin of left upper extremity including shoulder    2. Mixed hyperlipidemia  - Lipid Profile; Future    3. Hepatic steatosis  - Comp Metabolic Panel; Future    4. Colitis  - CBC WITH DIFFERENTIAL; Future  - Comp Metabolic Panel; Future    5. Vitamin D deficiency  - VITAMIN D,25 HYDROXY; Future    6. Elevated TSH  - FREE THYROXINE; Future  - TSH; Future    7. Screening for prostate cancer  - PROSTATE SPECIFIC AG SCREENING; Future

## 2022-01-15 ENCOUNTER — TELEPHONE (OUTPATIENT)
Dept: NEPHROLOGY | Facility: MEDICAL CENTER | Age: 57
End: 2022-01-15

## 2022-01-15 ENCOUNTER — HOSPITAL ENCOUNTER (INPATIENT)
Facility: MEDICAL CENTER | Age: 57
LOS: 4 days | DRG: 682 | End: 2022-01-19
Attending: INTERNAL MEDICINE | Admitting: INTERNAL MEDICINE
Payer: MEDICAID

## 2022-01-15 DIAGNOSIS — C44.619 BASAL CELL CARCINOMA (BCC) OF SKIN OF LEFT UPPER EXTREMITY INCLUDING SHOULDER: ICD-10-CM

## 2022-01-15 DIAGNOSIS — U07.1 COVID-19 VIRUS INFECTION: ICD-10-CM

## 2022-01-15 DIAGNOSIS — N17.9 ACUTE RENAL FAILURE, UNSPECIFIED ACUTE RENAL FAILURE TYPE (HCC): ICD-10-CM

## 2022-01-15 DIAGNOSIS — E87.29 HIGH ANION GAP METABOLIC ACIDOSIS: ICD-10-CM

## 2022-01-15 DIAGNOSIS — Q80.1 ICHTHYOSIS, X-LINKED: ICD-10-CM

## 2022-01-15 PROBLEM — E87.5 HYPERKALEMIA: Status: ACTIVE | Noted: 2022-01-15

## 2022-01-15 PROCEDURE — A9270 NON-COVERED ITEM OR SERVICE: HCPCS | Performed by: INTERNAL MEDICINE

## 2022-01-15 PROCEDURE — 700102 HCHG RX REV CODE 250 W/ 637 OVERRIDE(OP): Performed by: INTERNAL MEDICINE

## 2022-01-15 PROCEDURE — 8E0ZXY6 ISOLATION: ICD-10-PCS | Performed by: INTERNAL MEDICINE

## 2022-01-15 PROCEDURE — 99223 1ST HOSP IP/OBS HIGH 75: CPT | Mod: AI | Performed by: INTERNAL MEDICINE

## 2022-01-15 PROCEDURE — 770020 HCHG ROOM/CARE - TELE (206)

## 2022-01-15 RX ORDER — LABETALOL HYDROCHLORIDE 5 MG/ML
10 INJECTION, SOLUTION INTRAVENOUS EVERY 6 HOURS PRN
Status: DISCONTINUED | OUTPATIENT
Start: 2022-01-15 | End: 2022-01-19 | Stop reason: HOSPADM

## 2022-01-15 RX ORDER — HEPARIN SODIUM 5000 [USP'U]/ML
5000 INJECTION, SOLUTION INTRAVENOUS; SUBCUTANEOUS EVERY 8 HOURS
Status: DISCONTINUED | OUTPATIENT
Start: 2022-01-16 | End: 2022-01-19 | Stop reason: HOSPADM

## 2022-01-15 RX ORDER — AMOXICILLIN 250 MG
2 CAPSULE ORAL 2 TIMES DAILY
Status: DISCONTINUED | OUTPATIENT
Start: 2022-01-15 | End: 2022-01-19 | Stop reason: HOSPADM

## 2022-01-15 RX ORDER — CHOLECALCIFEROL (VITAMIN D3) 125 MCG
5 CAPSULE ORAL NIGHTLY
Status: DISCONTINUED | OUTPATIENT
Start: 2022-01-15 | End: 2022-01-19 | Stop reason: HOSPADM

## 2022-01-15 RX ORDER — ONDANSETRON 4 MG/1
4 TABLET, ORALLY DISINTEGRATING ORAL EVERY 6 HOURS PRN
Status: DISCONTINUED | OUTPATIENT
Start: 2022-01-15 | End: 2022-01-19 | Stop reason: HOSPADM

## 2022-01-15 RX ORDER — OXYCODONE HYDROCHLORIDE 5 MG/1
5 TABLET ORAL EVERY 4 HOURS PRN
Status: DISCONTINUED | OUTPATIENT
Start: 2022-01-15 | End: 2022-01-19 | Stop reason: HOSPADM

## 2022-01-15 RX ORDER — POLYETHYLENE GLYCOL 3350 17 G/17G
1 POWDER, FOR SOLUTION ORAL
Status: DISCONTINUED | OUTPATIENT
Start: 2022-01-15 | End: 2022-01-19 | Stop reason: HOSPADM

## 2022-01-15 RX ORDER — ROSUVASTATIN CALCIUM 10 MG/1
10 TABLET, COATED ORAL EVERY EVENING
Status: DISCONTINUED | OUTPATIENT
Start: 2022-01-16 | End: 2022-01-19 | Stop reason: HOSPADM

## 2022-01-15 RX ORDER — DIVALPROEX SODIUM 500 MG/1
500 TABLET, DELAYED RELEASE ORAL 2 TIMES DAILY
Status: DISCONTINUED | OUTPATIENT
Start: 2022-01-15 | End: 2022-01-19 | Stop reason: HOSPADM

## 2022-01-15 RX ORDER — GABAPENTIN 100 MG/1
100 CAPSULE ORAL 3 TIMES DAILY
Status: DISCONTINUED | OUTPATIENT
Start: 2022-01-16 | End: 2022-01-19 | Stop reason: HOSPADM

## 2022-01-15 RX ORDER — BUPROPION HYDROCHLORIDE 300 MG/1
300 TABLET ORAL
Status: DISCONTINUED | OUTPATIENT
Start: 2022-01-15 | End: 2022-01-15

## 2022-01-15 RX ORDER — GUAIFENESIN/DEXTROMETHORPHAN 100-10MG/5
10 SYRUP ORAL EVERY 6 HOURS PRN
Status: DISCONTINUED | OUTPATIENT
Start: 2022-01-15 | End: 2022-01-19 | Stop reason: HOSPADM

## 2022-01-15 RX ORDER — BUPROPION HYDROCHLORIDE 150 MG/1
150 TABLET, EXTENDED RELEASE ORAL 2 TIMES DAILY
Status: DISCONTINUED | OUTPATIENT
Start: 2022-01-15 | End: 2022-01-19 | Stop reason: HOSPADM

## 2022-01-15 RX ORDER — OMEPRAZOLE 20 MG/1
20 CAPSULE, DELAYED RELEASE ORAL DAILY
Status: DISCONTINUED | OUTPATIENT
Start: 2022-01-16 | End: 2022-01-16

## 2022-01-15 RX ORDER — ACETAMINOPHEN 325 MG/1
650 TABLET ORAL EVERY 6 HOURS PRN
Status: DISCONTINUED | OUTPATIENT
Start: 2022-01-15 | End: 2022-01-19 | Stop reason: HOSPADM

## 2022-01-15 RX ORDER — ONDANSETRON 2 MG/ML
4 INJECTION INTRAMUSCULAR; INTRAVENOUS EVERY 6 HOURS PRN
Status: DISCONTINUED | OUTPATIENT
Start: 2022-01-15 | End: 2022-01-19 | Stop reason: HOSPADM

## 2022-01-15 RX ORDER — BISACODYL 10 MG
10 SUPPOSITORY, RECTAL RECTAL
Status: DISCONTINUED | OUTPATIENT
Start: 2022-01-15 | End: 2022-01-19 | Stop reason: HOSPADM

## 2022-01-15 RX ORDER — PANTOPRAZOLE SODIUM 40 MG/1
40 TABLET, DELAYED RELEASE ORAL
Status: DISCONTINUED | OUTPATIENT
Start: 2022-01-15 | End: 2022-01-15

## 2022-01-15 RX ADMIN — BUPROPION HYDROCHLORIDE 150 MG: 150 TABLET, EXTENDED RELEASE ORAL at 23:32

## 2022-01-15 RX ADMIN — DIVALPROEX SODIUM 500 MG: 500 TABLET, DELAYED RELEASE ORAL at 23:32

## 2022-01-15 RX ADMIN — OXYCODONE 5 MG: 5 TABLET ORAL at 23:32

## 2022-01-15 RX ADMIN — Medication 5 MG: at 23:32

## 2022-01-15 ASSESSMENT — COGNITIVE AND FUNCTIONAL STATUS - GENERAL
WALKING IN HOSPITAL ROOM: A LOT
SUGGESTED CMS G CODE MODIFIER MOBILITY: CM
MOVING TO AND FROM BED TO CHAIR: UNABLE
HELP NEEDED FOR BATHING: A LOT
TOILETING: A LOT
PERSONAL GROOMING: A LITTLE
DAILY ACTIVITIY SCORE: 15
EATING MEALS: A LITTLE
CLIMB 3 TO 5 STEPS WITH RAILING: TOTAL
MOBILITY SCORE: 9
STANDING UP FROM CHAIR USING ARMS: A LOT
DRESSING REGULAR UPPER BODY CLOTHING: A LITTLE
MOVING FROM LYING ON BACK TO SITTING ON SIDE OF FLAT BED: UNABLE
DRESSING REGULAR LOWER BODY CLOTHING: A LOT
SUGGESTED CMS G CODE MODIFIER DAILY ACTIVITY: CK
TURNING FROM BACK TO SIDE WHILE IN FLAT BAD: A LOT

## 2022-01-15 ASSESSMENT — PAIN DESCRIPTION - PAIN TYPE: TYPE: ACUTE PAIN

## 2022-01-15 ASSESSMENT — ENCOUNTER SYMPTOMS
COUGH: 1
WEAKNESS: 0
ABDOMINAL PAIN: 1
LOSS OF CONSCIOUSNESS: 0
SHORTNESS OF BREATH: 0
FALLS: 0
DIARRHEA: 0
PALPITATIONS: 0
STRIDOR: 0
DIZZINESS: 0
SPUTUM PRODUCTION: 0
CHILLS: 0
DEPRESSION: 0
CONSTIPATION: 0
HEADACHES: 0
NAUSEA: 0
FEVER: 0
TINGLING: 0
VOMITING: 0
MYALGIAS: 1

## 2022-01-15 ASSESSMENT — LIFESTYLE VARIABLES
EVER FELT BAD OR GUILTY ABOUT YOUR DRINKING: NO
CONSUMPTION TOTAL: NEGATIVE
ON A TYPICAL DAY WHEN YOU DRINK ALCOHOL HOW MANY DRINKS DO YOU HAVE: 0
TOTAL SCORE: 0
TOTAL SCORE: 0
HAVE YOU EVER FELT YOU SHOULD CUT DOWN ON YOUR DRINKING: NO
TOTAL SCORE: 0
HOW MANY TIMES IN THE PAST YEAR HAVE YOU HAD 5 OR MORE DRINKS IN A DAY: 0
AVERAGE NUMBER OF DAYS PER WEEK YOU HAVE A DRINK CONTAINING ALCOHOL: 0
EVER HAD A DRINK FIRST THING IN THE MORNING TO STEADY YOUR NERVES TO GET RID OF A HANGOVER: NO
HAVE PEOPLE ANNOYED YOU BY CRITICIZING YOUR DRINKING: NO
ALCOHOL_USE: NO

## 2022-01-15 ASSESSMENT — FIBROSIS 4 INDEX: FIB4 SCORE: 1.07

## 2022-01-15 ASSESSMENT — PATIENT HEALTH QUESTIONNAIRE - PHQ9
1. LITTLE INTEREST OR PLEASURE IN DOING THINGS: NOT AT ALL
SUM OF ALL RESPONSES TO PHQ9 QUESTIONS 1 AND 2: 0
2. FEELING DOWN, DEPRESSED, IRRITABLE, OR HOPELESS: NOT AT ALL

## 2022-01-16 ENCOUNTER — APPOINTMENT (OUTPATIENT)
Dept: RADIOLOGY | Facility: MEDICAL CENTER | Age: 57
DRG: 682 | End: 2022-01-16
Attending: INTERNAL MEDICINE
Payer: MEDICAID

## 2022-01-16 PROBLEM — F10.90 ALCOHOL USE: Status: ACTIVE | Noted: 2022-01-16

## 2022-01-16 PROBLEM — Z78.9 ALCOHOL USE: Status: ACTIVE | Noted: 2022-01-16

## 2022-01-16 LAB
ALBUMIN SERPL BCP-MCNC: 3.2 G/DL (ref 3.2–4.9)
ALBUMIN/GLOB SERPL: 1.3 G/DL
ALP SERPL-CCNC: 58 U/L (ref 30–99)
ALT SERPL-CCNC: 10 U/L (ref 2–50)
ANION GAP SERPL CALC-SCNC: 26 MMOL/L (ref 7–16)
APPEARANCE UR: CLEAR
AST SERPL-CCNC: 14 U/L (ref 12–45)
BACTERIA #/AREA URNS HPF: ABNORMAL /HPF
BASOPHILS # BLD AUTO: 0.3 % (ref 0–1.8)
BASOPHILS # BLD: 0.02 K/UL (ref 0–0.12)
BILIRUB SERPL-MCNC: <0.2 MG/DL (ref 0.1–1.5)
BILIRUB UR QL STRIP.AUTO: NEGATIVE
BUN SERPL-MCNC: 106 MG/DL (ref 8–22)
CALCIUM SERPL-MCNC: 6.8 MG/DL (ref 8.4–10.2)
CHLORIDE SERPL-SCNC: 103 MMOL/L (ref 96–112)
CO2 SERPL-SCNC: 9 MMOL/L (ref 20–33)
COLOR UR: YELLOW
CREAT SERPL-MCNC: 15.74 MG/DL (ref 0.5–1.4)
CREAT UR-MCNC: 151.58 MG/DL
CREAT UR-MCNC: 152.04 MG/DL
CRP SERPL HS-MCNC: 11.43 MG/DL (ref 0–0.75)
D DIMER PPP IA.FEU-MCNC: 1.06 UG/ML (FEU) (ref 0–0.5)
EOSINOPHIL # BLD AUTO: 0.01 K/UL (ref 0–0.51)
EOSINOPHIL NFR BLD: 0.2 % (ref 0–6.9)
EPI CELLS #/AREA URNS HPF: ABNORMAL /HPF
ERYTHROCYTE [DISTWIDTH] IN BLOOD BY AUTOMATED COUNT: 51.1 FL (ref 35.9–50)
ERYTHROCYTE [DISTWIDTH] IN BLOOD BY AUTOMATED COUNT: 51.5 FL (ref 35.9–50)
GLOBULIN SER CALC-MCNC: 2.5 G/DL (ref 1.9–3.5)
GLUCOSE SERPL-MCNC: 87 MG/DL (ref 65–99)
GLUCOSE UR STRIP.AUTO-MCNC: NEGATIVE MG/DL
HAV IGM SERPL QL IA: NORMAL
HBV CORE AB SERPL QL IA: NONREACTIVE
HBV CORE IGM SER QL: NORMAL
HBV SURFACE AB SERPL IA-ACNC: <3.5 MIU/ML (ref 0–10)
HBV SURFACE AG SER QL: NORMAL
HCT VFR BLD AUTO: 39.7 % (ref 42–52)
HCT VFR BLD AUTO: 40.7 % (ref 42–52)
HCV AB SER QL: NORMAL
HGB BLD-MCNC: 13.5 G/DL (ref 14–18)
HGB BLD-MCNC: 13.9 G/DL (ref 14–18)
HYALINE CASTS #/AREA URNS LPF: ABNORMAL /LPF
IMM GRANULOCYTES # BLD AUTO: 0.21 K/UL (ref 0–0.11)
IMM GRANULOCYTES NFR BLD AUTO: 3.2 % (ref 0–0.9)
KETONES UR STRIP.AUTO-MCNC: NEGATIVE MG/DL
LEUKOCYTE ESTERASE UR QL STRIP.AUTO: NEGATIVE
LYMPHOCYTES # BLD AUTO: 0.67 K/UL (ref 1–4.8)
LYMPHOCYTES NFR BLD: 10.1 % (ref 22–41)
MCH RBC QN AUTO: 32.1 PG (ref 27–33)
MCH RBC QN AUTO: 32.5 PG (ref 27–33)
MCHC RBC AUTO-ENTMCNC: 34 G/DL (ref 33.7–35.3)
MCHC RBC AUTO-ENTMCNC: 34.2 G/DL (ref 33.7–35.3)
MCV RBC AUTO: 94.5 FL (ref 81.4–97.8)
MCV RBC AUTO: 95.1 FL (ref 81.4–97.8)
MICRO URNS: ABNORMAL
MICROALBUMIN UR-MCNC: 6.2 MG/DL
MICROALBUMIN/CREAT UR: 41 MG/G (ref 0–30)
MONOCYTES # BLD AUTO: 0.44 K/UL (ref 0–0.85)
MONOCYTES NFR BLD AUTO: 6.6 % (ref 0–13.4)
MUCOUS THREADS #/AREA URNS HPF: ABNORMAL /HPF
NEUTROPHILS # BLD AUTO: 5.28 K/UL (ref 1.82–7.42)
NEUTROPHILS NFR BLD: 79.6 % (ref 44–72)
NITRITE UR QL STRIP.AUTO: NEGATIVE
NRBC # BLD AUTO: 0 K/UL
NRBC BLD-RTO: 0 /100 WBC
PH UR STRIP.AUTO: 6 [PH] (ref 5–8)
PLATELET # BLD AUTO: 130 K/UL (ref 164–446)
PLATELET # BLD AUTO: 135 K/UL (ref 164–446)
PMV BLD AUTO: 11.2 FL (ref 9–12.9)
PMV BLD AUTO: 11.5 FL (ref 9–12.9)
POTASSIUM SERPL-SCNC: 6.4 MMOL/L (ref 3.6–5.5)
PROCALCITONIN SERPL-MCNC: 0.49 NG/ML
PROT SERPL-MCNC: 5.7 G/DL (ref 6–8.2)
PROT UR QL STRIP: 30 MG/DL
PROT UR-MCNC: 56 MG/DL (ref 0–15)
PROT/CREAT UR: 369 MG/G (ref 15–68)
RBC # BLD AUTO: 4.2 M/UL (ref 4.7–6.1)
RBC # BLD AUTO: 4.28 M/UL (ref 4.7–6.1)
RBC # URNS HPF: ABNORMAL /HPF
RBC UR QL AUTO: ABNORMAL
SODIUM SERPL-SCNC: 138 MMOL/L (ref 135–145)
SODIUM UR-SCNC: 83 MMOL/L
SP GR UR STRIP.AUTO: 1.01
UNIDENT CRYS URNS QL MICRO: ABNORMAL /HPF
WBC # BLD AUTO: 6.5 K/UL (ref 4.8–10.8)
WBC # BLD AUTO: 6.6 K/UL (ref 4.8–10.8)
WBC #/AREA URNS HPF: ABNORMAL /HPF

## 2022-01-16 PROCEDURE — 85027 COMPLETE CBC AUTOMATED: CPT

## 2022-01-16 PROCEDURE — 700105 HCHG RX REV CODE 258: Performed by: INTERNAL MEDICINE

## 2022-01-16 PROCEDURE — 82043 UR ALBUMIN QUANTITATIVE: CPT

## 2022-01-16 PROCEDURE — 86140 C-REACTIVE PROTEIN: CPT

## 2022-01-16 PROCEDURE — 99233 SBSQ HOSP IP/OBS HIGH 50: CPT | Performed by: INTERNAL MEDICINE

## 2022-01-16 PROCEDURE — 85025 COMPLETE CBC W/AUTO DIFF WBC: CPT

## 2022-01-16 PROCEDURE — 86704 HEP B CORE ANTIBODY TOTAL: CPT

## 2022-01-16 PROCEDURE — 81001 URINALYSIS AUTO W/SCOPE: CPT

## 2022-01-16 PROCEDURE — 84300 ASSAY OF URINE SODIUM: CPT

## 2022-01-16 PROCEDURE — 700111 HCHG RX REV CODE 636 W/ 250 OVERRIDE (IP): Performed by: INTERNAL MEDICINE

## 2022-01-16 PROCEDURE — 02H633Z INSERTION OF INFUSION DEVICE INTO RIGHT ATRIUM, PERCUTANEOUS APPROACH: ICD-10-PCS | Performed by: INTERNAL MEDICINE

## 2022-01-16 PROCEDURE — 99255 IP/OBS CONSLTJ NEW/EST HI 80: CPT | Mod: 25 | Performed by: INTERNAL MEDICINE

## 2022-01-16 PROCEDURE — 80053 COMPREHEN METABOLIC PANEL: CPT

## 2022-01-16 PROCEDURE — 99406 BEHAV CHNG SMOKING 3-10 MIN: CPT | Performed by: INTERNAL MEDICINE

## 2022-01-16 PROCEDURE — 86706 HEP B SURFACE ANTIBODY: CPT

## 2022-01-16 PROCEDURE — 700102 HCHG RX REV CODE 250 W/ 637 OVERRIDE(OP): Performed by: INTERNAL MEDICINE

## 2022-01-16 PROCEDURE — 76775 US EXAM ABDO BACK WALL LIM: CPT

## 2022-01-16 PROCEDURE — 85379 FIBRIN DEGRADATION QUANT: CPT

## 2022-01-16 PROCEDURE — 84145 PROCALCITONIN (PCT): CPT

## 2022-01-16 PROCEDURE — 80074 ACUTE HEPATITIS PANEL: CPT

## 2022-01-16 PROCEDURE — 770020 HCHG ROOM/CARE - TELE (206)

## 2022-01-16 PROCEDURE — A9270 NON-COVERED ITEM OR SERVICE: HCPCS | Performed by: INTERNAL MEDICINE

## 2022-01-16 PROCEDURE — 90935 HEMODIALYSIS ONE EVALUATION: CPT

## 2022-01-16 PROCEDURE — 82570 ASSAY OF URINE CREATININE: CPT

## 2022-01-16 PROCEDURE — 84156 ASSAY OF PROTEIN URINE: CPT

## 2022-01-16 PROCEDURE — 36556 INSERT NON-TUNNEL CV CATH: CPT | Mod: RT | Performed by: INTERNAL MEDICINE

## 2022-01-16 RX ORDER — LORAZEPAM 0.5 MG/1
0.5 TABLET ORAL EVERY 4 HOURS PRN
Status: DISCONTINUED | OUTPATIENT
Start: 2022-01-16 | End: 2022-01-19 | Stop reason: HOSPADM

## 2022-01-16 RX ORDER — LORAZEPAM 2 MG/ML
2 INJECTION INTRAMUSCULAR
Status: DISCONTINUED | OUTPATIENT
Start: 2022-01-16 | End: 2022-01-19 | Stop reason: HOSPADM

## 2022-01-16 RX ORDER — LORAZEPAM 2 MG/ML
1 INJECTION INTRAMUSCULAR
Status: DISCONTINUED | OUTPATIENT
Start: 2022-01-16 | End: 2022-01-19 | Stop reason: HOSPADM

## 2022-01-16 RX ORDER — LORAZEPAM 1 MG/1
4 TABLET ORAL
Status: DISCONTINUED | OUTPATIENT
Start: 2022-01-16 | End: 2022-01-19 | Stop reason: HOSPADM

## 2022-01-16 RX ORDER — LORAZEPAM 1 MG/1
1 TABLET ORAL EVERY 4 HOURS PRN
Status: DISCONTINUED | OUTPATIENT
Start: 2022-01-16 | End: 2022-01-19 | Stop reason: HOSPADM

## 2022-01-16 RX ORDER — FAMOTIDINE 20 MG/1
20 TABLET, FILM COATED ORAL EVERY 12 HOURS PRN
Status: DISCONTINUED | OUTPATIENT
Start: 2022-01-16 | End: 2022-01-19 | Stop reason: HOSPADM

## 2022-01-16 RX ORDER — LORAZEPAM 2 MG/ML
0.5 INJECTION INTRAMUSCULAR EVERY 4 HOURS PRN
Status: DISCONTINUED | OUTPATIENT
Start: 2022-01-16 | End: 2022-01-19 | Stop reason: HOSPADM

## 2022-01-16 RX ORDER — SODIUM CHLORIDE 9 MG/ML
1000 INJECTION, SOLUTION INTRAVENOUS ONCE
Status: COMPLETED | OUTPATIENT
Start: 2022-01-16 | End: 2022-01-16

## 2022-01-16 RX ORDER — LORAZEPAM 2 MG/ML
1.5 INJECTION INTRAMUSCULAR
Status: DISCONTINUED | OUTPATIENT
Start: 2022-01-16 | End: 2022-01-19 | Stop reason: HOSPADM

## 2022-01-16 RX ORDER — LORAZEPAM 1 MG/1
3 TABLET ORAL
Status: DISCONTINUED | OUTPATIENT
Start: 2022-01-16 | End: 2022-01-19 | Stop reason: HOSPADM

## 2022-01-16 RX ORDER — LORAZEPAM 1 MG/1
2 TABLET ORAL
Status: DISCONTINUED | OUTPATIENT
Start: 2022-01-16 | End: 2022-01-19 | Stop reason: HOSPADM

## 2022-01-16 RX ORDER — FOLIC ACID 1 MG/1
1 TABLET ORAL DAILY
Status: DISCONTINUED | OUTPATIENT
Start: 2022-01-17 | End: 2022-01-19 | Stop reason: HOSPADM

## 2022-01-16 RX ORDER — SODIUM CHLORIDE 9 MG/ML
250 INJECTION, SOLUTION INTRAVENOUS
Status: DISCONTINUED | OUTPATIENT
Start: 2022-01-16 | End: 2022-01-19 | Stop reason: HOSPADM

## 2022-01-16 RX ORDER — GAUZE BANDAGE 2" X 2"
100 BANDAGE TOPICAL DAILY
Status: DISCONTINUED | OUTPATIENT
Start: 2022-01-17 | End: 2022-01-19 | Stop reason: HOSPADM

## 2022-01-16 RX ORDER — HEPARIN SODIUM 1000 [USP'U]/ML
2800 INJECTION, SOLUTION INTRAVENOUS; SUBCUTANEOUS PRN
Status: DISCONTINUED | OUTPATIENT
Start: 2022-01-16 | End: 2022-01-19 | Stop reason: HOSPADM

## 2022-01-16 RX ADMIN — BUPROPION HYDROCHLORIDE 150 MG: 150 TABLET, EXTENDED RELEASE ORAL at 05:18

## 2022-01-16 RX ADMIN — Medication 5 MG: at 21:38

## 2022-01-16 RX ADMIN — DIVALPROEX SODIUM 500 MG: 500 TABLET, DELAYED RELEASE ORAL at 13:49

## 2022-01-16 RX ADMIN — GABAPENTIN 100 MG: 100 CAPSULE ORAL at 13:48

## 2022-01-16 RX ADMIN — HEPARIN SODIUM 5000 UNITS: 5000 INJECTION, SOLUTION INTRAVENOUS; SUBCUTANEOUS at 23:51

## 2022-01-16 RX ADMIN — HEPARIN SODIUM 5000 UNITS: 5000 INJECTION, SOLUTION INTRAVENOUS; SUBCUTANEOUS at 17:33

## 2022-01-16 RX ADMIN — SENNOSIDES AND DOCUSATE SODIUM 2 TABLET: 50; 8.6 TABLET ORAL at 19:04

## 2022-01-16 RX ADMIN — ROSUVASTATIN 10 MG: 10 TABLET, FILM COATED ORAL at 19:04

## 2022-01-16 RX ADMIN — OXYCODONE 5 MG: 5 TABLET ORAL at 14:42

## 2022-01-16 RX ADMIN — SODIUM CHLORIDE 1000 ML: 9 INJECTION, SOLUTION INTRAVENOUS at 05:34

## 2022-01-16 RX ADMIN — OXYCODONE 5 MG: 5 TABLET ORAL at 21:38

## 2022-01-16 RX ADMIN — HEPARIN SODIUM 2800 UNITS: 1000 INJECTION, SOLUTION INTRAVENOUS; SUBCUTANEOUS at 18:05

## 2022-01-16 RX ADMIN — SODIUM BICARBONATE: 84 INJECTION, SOLUTION INTRAVENOUS at 09:31

## 2022-01-16 RX ADMIN — DIVALPROEX SODIUM 500 MG: 500 TABLET, DELAYED RELEASE ORAL at 23:51

## 2022-01-16 RX ADMIN — BUPROPION HYDROCHLORIDE 150 MG: 150 TABLET, EXTENDED RELEASE ORAL at 19:03

## 2022-01-16 RX ADMIN — SODIUM BICARBONATE 50 MEQ: 84 INJECTION, SOLUTION INTRAVENOUS at 09:15

## 2022-01-16 RX ADMIN — GABAPENTIN 100 MG: 100 CAPSULE ORAL at 19:03

## 2022-01-16 RX ADMIN — HEPARIN SODIUM 5000 UNITS: 5000 INJECTION, SOLUTION INTRAVENOUS; SUBCUTANEOUS at 09:15

## 2022-01-16 RX ADMIN — GABAPENTIN 100 MG: 100 CAPSULE ORAL at 05:18

## 2022-01-16 ASSESSMENT — ENCOUNTER SYMPTOMS
FEVER: 0
VOMITING: 0
ABDOMINAL PAIN: 0
NAUSEA: 1
SHORTNESS OF BREATH: 0

## 2022-01-16 ASSESSMENT — LIFESTYLE VARIABLES
AUDITORY DISTURBANCES: NOT PRESENT
NAUSEA AND VOMITING: NO NAUSEA AND NO VOMITING
HEADACHE, FULLNESS IN HEAD: NOT PRESENT
VISUAL DISTURBANCES: NOT PRESENT
NAUSEA AND VOMITING: NO NAUSEA AND NO VOMITING
PAROXYSMAL SWEATS: NO SWEAT VISIBLE
PAROXYSMAL SWEATS: NO SWEAT VISIBLE
ANXIETY: *
TOTAL SCORE: 8
VISUAL DISTURBANCES: NOT PRESENT
ORIENTATION AND CLOUDING OF SENSORIUM: ORIENTED AND CAN DO SERIAL ADDITIONS
AGITATION: NORMAL ACTIVITY
TOTAL SCORE: 4
ORIENTATION AND CLOUDING OF SENSORIUM: ORIENTED AND CAN DO SERIAL ADDITIONS
ANXIETY: MILDLY ANXIOUS
AUDITORY DISTURBANCES: NOT PRESENT
TREMOR: MODERATE TREMOR WITH ARMS EXTENDED
TREMOR: *
AGITATION: SOMEWHAT MORE THAN NORMAL ACTIVITY
HEADACHE, FULLNESS IN HEAD: NOT PRESENT

## 2022-01-16 NOTE — ASSESSMENT & PLAN NOTE
"- I think this is causing most of his symptoms, he has been vaccinated but this was last year, no booster  -Keep patient isolated  -Chest x-ray at outside facility showed no acute cardiopulmonary process, no abnormal breath sounds, satting 95% on room air, no need for Decadron  -I think if his creatinine is still elevated, he would benefit from additional IV fluids but I am going to wait for repeat labs  -If ongoing IV fluids are used will use them judiciously\"    Now hypoxic  Start decadron  Dopplers for elevated Ddimer  Treat possible superimposed pneumonia; procalcitonin elevated  "

## 2022-01-16 NOTE — CARE PLAN
The patient is Stable - Low risk of patient condition declining or worsening    Shift Goals  Clinical Goals: Monitor labs for potassium and kidney function  Patient Goals: rest; pain control    Progress made toward(s) clinical / shift goals:  Monitor labs for hyperkalemia and kidney function. Notify MD if still hyperkalemic. Cluster care to allow patient to rest and sleep.    Problem: Knowledge Deficit - Standard  Goal: Patient and family/care givers will demonstrate understanding of plan of care, disease process/condition, diagnostic tests and medications  Outcome: Progressing     Problem: Fall Risk  Goal: Patient will remain free from falls  Outcome: Progressing       Patient is not progressing towards the following goals:N/A

## 2022-01-16 NOTE — ASSESSMENT & PLAN NOTE
- per nephro likely ATN from covid   -Hold home ACE inhibitor  -Avoid nephrotoxic agents  -Obtain urinary electrolytes and renal ultrasound  -Await additional recommendations per nephrology  -Causing hyperkalemia with no telemetry changes, this is been treated at the outside facility, repeat labs pending, continue to monitor on telemetry  -He does have a Solomon in place but apparently only had 250 mL of urine in his bladder at the time of placement, I do not think urinary obstruction is worsening his kidney function but we will continue to leave the Solomon in place    Hyperkalemic on labs  Acidotic  Sodium bicarb infusion  S/p HD on 1/16   -outside lab 1/2 blood culture with contaminant  -change to augmentin, dc vanc     Now hypoxic. CoVID positive. Add decadron.   Elevated Ddimer, no DVT on venous US. Discussed with Nephrology, cannot order CT Chest contrast while Cr- elevated and elucidating cause of renal failure.  Repeat bcx negative

## 2022-01-16 NOTE — PROGRESS NOTES
"[facility] Medicine Daily Progress Note    Date of Service  1/16/2022    Chief Complaint  Herman Chiang is a 56 y.o. male admitted 1/15/2022 with 1 week history of severe fatigue.      Hospital Course  No notes on file  He has a history of chronic abdominal pain but has not seen a GI doctor and remote history of \"kidney problems\" that \"resolved\". He has a history of hypertension, hyperlipidemia and is on Divalproex (seizures versus psychiatric issues). He presents with severe fatigue. He also has poor urine output.   It appears he was seen at an outside facility or clinic and transferred here to [facility], presumably for abnormal labs please review Dr. Perez's HnP.  At [facility], he is afebrile, low normal blood pressures but tachycardiac and slightly tachypneic.  Labs from outside facility:  White blood cell count 9 hemoglobin 13.7 hematocrit 40.3 platelets 132 sodium 133 potassium 5.9 chloride 102 CO2 12 anion gap 19  creatinine 17.1 glucose 107  Nephrology consulted  US renal ordered.  CoVID Positive but not hypoxic.    Interval Problem Update  1/16. He seemed tremulous to me. He says he drinks beer but then tells me he hasn;t drank for a long time. He has a rash. He is also tachycardic. I ave spoken with  Dr. Archer, who plans dialysis.    I have personally seen and examined the patient at bedside. I discussed the plan of care with patient, bedside RN and nephrology.    Consultants/Specialty  nephrology    Code Status  Full Code    Disposition  Patient is not medically cleared for discharge.   Anticipate discharge to D.  I have placed the appropriate orders for post-discharge needs.    Review of Systems  Review of Systems   Unable to perform ROS: Other    Seems unreliable    Physical Exam  Temp:  [36.6 °C (97.8 °F)-36.7 °C (98 °F)] 36.6 °C (97.8 °F)  Pulse:  [] 101  Resp:  [20] 20  BP: ()/(35-53) 81/53  SpO2:  [95 %-97 %] 95 %    Physical Exam  Vitals and nursing note reviewed. "   Constitutional:       Comments: Overweight   HENT:      Head: Normocephalic and atraumatic.      Right Ear: External ear normal.      Left Ear: External ear normal.      Nose: Nose normal.      Mouth/Throat:      Mouth: Mucous membranes are moist.   Eyes:      General: No scleral icterus.     Conjunctiva/sclera: Conjunctivae normal.   Cardiovascular:      Rate and Rhythm: Regular rhythm. Tachycardia present.      Heart sounds: No murmur heard.  No friction rub. No gallop.    Pulmonary:      Effort: Pulmonary effort is normal.      Breath sounds: Normal breath sounds.   Abdominal:      General: Abdomen is flat. Bowel sounds are normal. There is no distension.      Palpations: Abdomen is soft.      Tenderness: There is no abdominal tenderness. There is no guarding.   Musculoskeletal:         General: Normal range of motion.      Cervical back: Normal range of motion and neck supple.   Skin:     General: Skin is warm.      Findings: Rash (probably from ichthyosis) present.   Neurological:      Mental Status: He is alert and oriented to person, place, and time. Mental status is at baseline.      Coordination: Coordination abnormal (tremulous).   Psychiatric:         Mood and Affect: Mood normal.         Behavior: Behavior normal.         Thought Content: Thought content normal.         Judgment: Judgment normal.      Comments: Looks anxious         Fluids    Intake/Output Summary (Last 24 hours) at 1/16/2022 0757  Last data filed at 1/16/2022 0640  Gross per 24 hour   Intake 1480 ml   Output --   Net 1480 ml       Laboratory  Recent Labs     01/16/22  0422   WBC 6.5  6.6   RBC 4.20*  4.28*   HEMOGLOBIN 13.5*  13.9*   HEMATOCRIT 39.7*  40.7*   MCV 94.5  95.1   MCH 32.1  32.5   MCHC 34.0  34.2   RDW 51.1*  51.5*   PLATELETCT 130*  135*   MPV 11.2  11.5     Recent Labs     01/16/22  0422   SODIUM 138   POTASSIUM 6.4*   CHLORIDE 103   CO2 9*   GLUCOSE 87   *   CREATININE 15.74*   CALCIUM 6.8*               "     Imaging  US-RENAL   Final Result      1.  Unremarkable kidneys.  No hydronephrosis.   2.  Limited evaluation of bladder.      DX-CHEST-FOR LINE PLACEMENT Perform procedure in: Patient's Room    (Results Pending)        Assessment/Plan  * ARF (acute renal failure), hyperkalemia, CoVID infection, h/o ichthyosis X. alcoholism? ([facility])- (present on admission)  Assessment & Plan  - I think this is most likely due to dehydration, IV fluids were given at the outside facility  -Await repeat labs, may need additional IV fluids but considering he does have COVID, avoid ongoing IV fluids for now however patient is not fluid overloaded at all  -Hold home ACE inhibitor  -Avoid nephrotoxic agents  -Obtain urinary electrolytes and renal ultrasound  -Await additional recommendations per nephrology  -Causing hyperkalemia with no telemetry changes, this is been treated at the outside facility, repeat labs pending, continue to monitor on telemetry  -He does have a Solomon in place but apparently only had 250 mL of urine in his bladder at the time of placement, I do not think urinary obstruction is worsening his kidney function but we will continue to leave the Solomon in place  -He does make urine, does not need emergent hemodialysis at this time\"    Hyperkalemic on labs  Acidotic  Sodium bicarb infusion  Dialysis planned per Dr. Archer        Alcohol use  Assessment & Plan  Could be withdrawing  Hyperkalemic so will only goive thiamine, multivitamin and folic acid  Ordered withdrawal protocol    High anion gap metabolic acidosis- (present on admission)  Assessment & Plan  - Even though this is anion gap acidosis, I think it is due to acute renal failure  -Fluids have been given at the outside facility, await repeat labs    Hyperkalemia- (present on admission)  Assessment & Plan  - Due to acute renal failure  -Treated at the outside facility  -No changes noted on telemetry or EKG  -Await repeat labs  -Continue to monitor on " telemetry  -No need for urgent hemodialysis    COVID-19 virus infection- (present on admission)  Assessment & Plan  - I think this is causing most of his symptoms, he has been vaccinated but this was last year, no booster  -Keep patient isolated  -Chest x-ray at outside facility showed no acute cardiopulmonary process, no abnormal breath sounds, satting 95% on room air, no need for Decadron  -I think if his creatinine is still elevated, he would benefit from additional IV fluids but I am going to wait for repeat labs  -If ongoing IV fluids are used will use them judiciously    Ichthyosis, 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)  Assessment & Plan  - Chronic, at baseline    Essential hypertension- (present on admission)  Assessment & Plan  - Initially had borderline low blood pressure, now his systolic pressure is a little bit greater than 100, hold home amlodipine and lisinopril  -Even though he has COVID, he can certainly tolerate fluid bolus if needed  -Start as needed labetalol and adjust as needed    Mixed hyperlipidemia- (present on admission)  Assessment & Plan  - Continue home statin    Bipolar affective (HCC)- (present on admission)  Assessment & Plan  - Continue home meds    GERD (gastroesophageal reflux disease)- (present on admission)  Assessment & Plan  - Continue home PPI    Dehydration- (present on admission)  Assessment & Plan  - I think this is the most likely cause of his renal failure however his renal failure significant, closely monitor for additional causes, await renal ultrasound  -Avoid ongoing IV fluids until repeat labs are obtained       VTE prophylaxis: heparin ppx    I have performed a physical exam and reviewed and updated ROS and Plan today (1/16/2022). In review of yesterday's note (1/15/2022), there are no changes except as documented above.

## 2022-01-16 NOTE — ASSESSMENT & PLAN NOTE
Could be withdrawing  Hyperkalemic so will only goive thiamine, multivitamin and folic acid  Ordered withdrawal protocol

## 2022-01-16 NOTE — ASSESSMENT & PLAN NOTE
"- Due to acute renal failure  -Treated at the outside facility  -No changes noted on telemetry or EKG  -Await repeat labs  -Continue to monitor on telemetry  -No need for urgent hemodialysis\"    dialzing  Resolved.  "

## 2022-01-16 NOTE — ASSESSMENT & PLAN NOTE
- Even though this is anion gap acidosis, I think it is due to acute renal failure  -Fluids have been given at the outside facility, await repeat labs

## 2022-01-16 NOTE — CONSULTS
Nephrology Consultation    Date of Service  1/16/2022    Referring Physician  George Vieira M.D.    Consulting Physician  Richmond Archer M.D.    Reason for Consultation  MADDISON    History of Presenting Illness  56 y.o. male with a history of X-linked ichthyosis, hypertension, no history of CKD who presented 1/15/2022 with 10 days of feeling unwell, and MADDISON.    The patient said he has felt unwell for the last 10 days.  He complained of poor appetite and nausea.  He denies diarrhea, does not think he was keeping fluids down very well.  He denies lower urinary tract symptoms.  He took an Advil here there, but not daily.  The patient presented to his local hospital in [facility], and was found to have acute kidney failure with creatinine of 17, and the patient was transferred to our hospital.  Solomon catheter was placed due to mild retention on bladder scan.  Kidney ultrasound did not reveal any evidence of hydronephrosis.  Patient was given IV hydration overnight, without improvement in his kidney function.  Discussed the need to initiate dialysis with the patient, and he agrees.  Patient is very hesitant to do long-term dialysis, but agrees to temporary dialysis.  Patient was incidentally diagnosed with COVID, but is asymptomatic from a respiratory standpoint.    Review of Systems  Review of Systems   Constitutional: Positive for malaise/fatigue. Negative for fever.   Respiratory: Negative for shortness of breath.    Cardiovascular: Negative for chest pain.   Gastrointestinal: Positive for nausea. Negative for abdominal pain and vomiting.   Genitourinary: Negative for dysuria.   All other systems reviewed and are negative.      Past Medical History  Past Medical History:   Diagnosis Date   • MADDISON (acute kidney injury) (HCC) 4/2/2016   • Allergy    • Anxiety    • Cancer (HCC)    • Cataract 2012    bilateral removal   • Chronic midline low back pain with bilateral sciatica    • Dehydration 4/2/2016   • Depression    •  GERD (gastroesophageal reflux disease)    • Hyperlipidemia    • Hypertension    • IBD (inflammatory bowel disease)     diarrhea   • Kidney disease    • Substance abuse (HCC)     Crack - clean since 2004       Surgical History  Past Surgical History:   Procedure Laterality Date   • EYE SURGERY Bilateral 2012    cataracts   • OTHER SURGICAL PROCEDURE Bilateral     BCCA skin multiple cites       Family History  Family History   Problem Relation Age of Onset   • Hypertension Mother    • Cancer Father         Nodular lymphoma   • Arthritis Brother    • Dementia Maternal Grandmother    • Stroke Maternal Grandmother    • Cancer Maternal Grandfather         lung   • Heart Attack Paternal Grandmother    • Heart Disease Paternal Grandmother    • Hypertension Paternal Grandmother    • Hyperlipidemia Paternal Grandmother    • No Known Problems Paternal Grandfather    • No Known Problems Brother    • Diabetes Neg Hx    • Kidney Disease Neg Hx        Social History  Social History     Socioeconomic History   • Marital status: Legally      Spouse name: Not on file   • Number of children: 1   • Years of education: Not on file   • Highest education level: High school graduate   Occupational History   • Not on file   Tobacco Use   • Smoking status: Current Every Day Smoker     Packs/day: 1.50     Years: 40.00     Pack years: 60.00     Types: Cigarettes   • Smokeless tobacco: Never Used   • Tobacco comment: Max was 4 PPD for 5 years   Vaping Use   • Vaping Use: Some days   • Start date: 2/9/2021   • Substances: THC   • Devices: Disposable   Substance and Sexual Activity   • Alcohol use: Yes     Alcohol/week: 12.6 oz     Types: 21 Cans of beer per week     Comment: 3 a day   • Drug use: Yes     Types: Marijuana, Inhaled     Comment: thc vape   • Sexual activity: Not Currently     Partners: Female   Other Topics Concern   • Not on file   Social History Narrative   • Not on file     Social Determinants of Health     Financial  Resource Strain:    • Difficulty of Paying Living Expenses: Not on file   Food Insecurity:    • Worried About Running Out of Food in the Last Year: Not on file   • Ran Out of Food in the Last Year: Not on file   Transportation Needs:    • Lack of Transportation (Medical): Not on file   • Lack of Transportation (Non-Medical): Not on file   Physical Activity:    • Days of Exercise per Week: Not on file   • Minutes of Exercise per Session: Not on file   Stress:    • Feeling of Stress : Not on file   Social Connections:    • Frequency of Communication with Friends and Family: Not on file   • Frequency of Social Gatherings with Friends and Family: Not on file   • Attends Druze Services: Not on file   • Active Member of Clubs or Organizations: Not on file   • Attends Club or Organization Meetings: Not on file   • Marital Status: Not on file   Intimate Partner Violence:    • Fear of Current or Ex-Partner: Not on file   • Emotionally Abused: Not on file   • Physically Abused: Not on file   • Sexually Abused: Not on file   Housing Stability:    • Unable to Pay for Housing in the Last Year: Not on file   • Number of Places Lived in the Last Year: Not on file   • Unstable Housing in the Last Year: Not on file       Medications  Current Facility-Administered Medications   Medication Dose Route Frequency Provider Last Rate Last Admin   • famotidine (PEPCID) tablet 20 mg  20 mg Oral Q12HRS PRN Darien Hutchinson D.O.       • sodium bicarbonate 8.4 % 150 mEq in dextrose 5% 1,000 mL infusion   Intravenous Continuous Richmond Archer M.D. 125 mL/hr at 01/16/22 0931 New Bag at 01/16/22 0931   • NS (BOLUS) infusion 250 mL  250 mL Intravenous DIALYSIS PRN Richmond Archer M.D.       • LORazepam (ATIVAN) tablet 0.5 mg  0.5 mg Oral Q4HRS PRN George Vieira M.D.       • LORazepam (ATIVAN) tablet 1 mg  1 mg Oral Q4HRS PRN George Vieira M.D.        Or   • LORazepam (ATIVAN) injection 0.5 mg  0.5 mg Intravenous Q4HRS PRN George Vieira  M.D.       • LORazepam (ATIVAN) tablet 2 mg  2 mg Oral Q2HRS PRN George Vieira M.D.        Or   • LORazepam (ATIVAN) injection 1 mg  1 mg Intravenous Q2HRS PRN George Vieira M.D.       • LORazepam (ATIVAN) tablet 3 mg  3 mg Oral Q HOUR PRN George Vieira M.D.        Or   • LORazepam (ATIVAN) injection 1.5 mg  1.5 mg Intravenous Q HOUR PRN George Vieira M.D.       • LORazepam (ATIVAN) tablet 4 mg  4 mg Oral Q15 MIN PRN George Vieira M.D.        Or   • LORazepam (ATIVAN) injection 2 mg  2 mg Intravenous Q15 MIN PRN George Vieira M.D.       • LORazepam (ATIVAN) tablet 2 mg  2 mg Oral Q2HRS PRN George Vieira M.D.        Or   • LORazepam (ATIVAN) injection 1 mg  1 mg Intravenous Q2HRS PRN George Vieira M.D.       • [START ON 1/17/2022] thiamine (Vitamin B-1) tablet 100 mg  100 mg Oral DAILY George Vieira M.D.        And   • [START ON 1/17/2022] multivitamin (THERAGRAN) tablet 1 Tablet  1 Tablet Oral DAILY George Vieira M.D.        And   • [START ON 1/17/2022] folic acid (FOLVITE) tablet 1 mg  1 mg Oral DAILY George Vieira M.D.       • divalproex (DEPAKOTE) delayed-release tablet 500 mg  500 mg Oral BID CATHERINE Emerson.O.   500 mg at 01/16/22 1349   • gabapentin (NEURONTIN) capsule 100 mg  100 mg Oral TID MG EmersonO.   100 mg at 01/16/22 1348   • rosuvastatin (CRESTOR) tablet 10 mg  10 mg Oral Q EVENING Darien Hutchinson D.O.       • ondansetron (ZOFRAN) syringe/vial injection 4 mg  4 mg Intravenous Q6HRS PRN Darien Hutchinson D.O.       • ondansetron (ZOFRAN ODT) dispertab 4 mg  4 mg Oral Q6HRS PRN Darien Hutchinson D.O.       • senna-docusate (PERICOLACE or SENOKOT S) 8.6-50 MG per tablet 2 Tablet  2 Tablet Oral BID Darien Hutchinson D.O.        And   • polyethylene glycol/lytes (MIRALAX) PACKET 1 Packet  1 Packet Oral QDAY PRN Darien Hutchinson D.O.        And   • magnesium hydroxide (MILK OF MAGNESIA) suspension 30 mL  30 mL Oral QDAY PRN Darien Hutchinson D.O.         And   • bisacodyl (DULCOLAX) suppository 10 mg  10 mg Rectal QDAY PRN Darien Hutchinson D.O.       • Pharmacy Consult Request - to monitor for nephrotoxic agents  1 Each Other PHARMACY TO DOSE Darien Hutchinson D.O.       • heparin injection 5,000 Units  5,000 Units Subcutaneous Q8HRS Darien Hutchinson D.O.   5,000 Units at 01/16/22 0915   • acetaminophen (Tylenol) tablet 650 mg  650 mg Oral Q6HRS PRN Darien Hutchinson D.O.       • labetalol (NORMODYNE/TRANDATE) injection 10 mg  10 mg Intravenous Q6HRS PRN Darien Hutchinson D.O.       • guaiFENesin dextromethorphan (ROBITUSSIN DM) 100-10 MG/5ML syrup 10 mL  10 mL Oral Q6HRS PRN Darien Hutchinson D.O.       • oxyCODONE immediate-release (ROXICODONE) tablet 5 mg  5 mg Oral Q4HRS PRN MG EmersonO.   5 mg at 01/15/22 2332   • melatonin tablet 5 mg  5 mg Oral Nightly MG EmersonO.   5 mg at 01/15/22 2332   • buPROPion SR (WELLBUTRIN-SR) tablet 150 mg  150 mg Oral BID MG EmersonO.   150 mg at 01/16/22 0518       Allergies  Allergies   Allergen Reactions   • Sulfamethoxazole Swelling   • Abilify      Flu like sx about 4 years ago   • Risperidone      Flu like sx 4 years ago   • Omeprazole Unspecified     Pt dislikes the way this drug makes him feel       Physical Exam  Temp:  [36.5 °C (97.7 °F)-36.7 °C (98 °F)] 36.5 °C (97.7 °F)  Pulse:  [] 105  Resp:  [20] 20  BP: ()/(35-58) 83/58  SpO2:  [95 %-97 %] 96 %    Physical Exam  Constitutional:       General: He is not in acute distress.     Appearance: He is well-developed. He is not diaphoretic.   HENT:      Head: Normocephalic and atraumatic.      Mouth/Throat:      Mouth: Mucous membranes are moist.      Pharynx: Oropharynx is clear. No oropharyngeal exudate.   Eyes:      General: No scleral icterus.     Extraocular Movements: Extraocular movements intact.   Neck:      Trachea: No tracheal deviation.   Cardiovascular:      Rate and Rhythm: Normal rate.      Heart sounds: Normal heart sounds.  No murmur heard.      Pulmonary:      Effort: Pulmonary effort is normal.      Breath sounds: Normal breath sounds. No stridor. No rales.   Abdominal:      General: There is no distension.      Palpations: Abdomen is soft.      Tenderness: There is no abdominal tenderness.   Musculoskeletal:         General: Normal range of motion.      Right lower leg: No edema.      Left lower leg: No edema.   Skin:     General: Skin is warm and dry.      Comments: Ichthyosis changes noted on arms, trunk, legs   Neurological:      General: No focal deficit present.      Mental Status: He is alert and oriented to person, place, and time.   Psychiatric:         Mood and Affect: Mood normal.         Behavior: Behavior normal.         Fluids      Laboratory  Labs reviewed, pertinent labs below.  Recent Labs     01/16/22 0422   WBC 6.5  6.6   RBC 4.20*  4.28*   HEMOGLOBIN 13.5*  13.9*   HEMATOCRIT 39.7*  40.7*   MCV 94.5  95.1   MCH 32.1  32.5   MCHC 34.0  34.2   RDW 51.1*  51.5*   PLATELETCT 130*  135*   MPV 11.2  11.5     Recent Labs     01/16/22 0422   SODIUM 138   POTASSIUM 6.4*   CHLORIDE 103   CO2 9*   GLUCOSE 87   *   CREATININE 15.74*   CALCIUM 6.8*                URINALYSIS:  Lab Results   Component Value Date/Time    COLORURINE Yellow 01/16/2022 0941    CLARITY Clear 01/16/2022 0941    SPECGRAVITY 1.015 01/16/2022 0941    PHURINE 6.0 01/16/2022 0941    KETONES Negative 01/16/2022 0941    PROTEINURIN 30 (A) 01/16/2022 0941    BILIRUBINUR Negative 01/16/2022 0941    NITRITE Negative 01/16/2022 0941    LEUKESTERAS Negative 01/16/2022 0941    OCCULTBLOOD Small (A) 01/16/2022 0941     UPC  No results found for: TOTPROTUR No results found for: CREATININEU    Imaging interpreted by radiologist. Imaging reports reviewed with pertinent findings below  US-RENAL   Final Result      1.  Unremarkable kidneys.  No hydronephrosis.   2.  Limited evaluation of bladder.      DX-CHEST-FOR LINE PLACEMENT Perform procedure in:  Patient's Room    (Results Pending)         Assessment/Plan  56 y.o. male with a history of X-linked ichthyosis, hypertension, no history of CKD who presented 1/15/2022 with 10 days of feeling unwell, and MADDISON.    1.  Acute kidney injury, oliguric.  No known history of CKD.  Unclear etiology of MADDISON, whether poor p.o. intake led to MADDISON, or patient had MADDISON first, that led to uremia and poor appetite.  Urinalysis with small blood and small protein.  Check urine protein creatinine ratio, urine albumin creatinine ratio.  Check serologies for glomerulonephritis.  If serologies are positive, will consider kidney biopsy.  For now, patient requires dialysis given hyperkalemia that has not resolved with medical management.  Recommend dialysis initiation daily over the next 3 days.  Avoid nephrotoxins.  Check labs daily.    2.  Hyperkalemia, present on admission, due to MADDISON.  Not improving with IV fluids.  Recommend dialysis initiation as above.    3.  Metabolic acidosis, due to MADDISON.  Recommend sodium bicarbonate 50 mEq IV x1, followed by 1 L of sodium bicarbonate 150 mEq in D5W at a rate of 125 mL/h.  Once the patient is started on dialysis, these alkali supplements can be discontinued, as metabolic acidosis should be managed by dialysis.    4.  Uremia, due to MADDISON.  Recommend dialysis initiation as above.    5.  Hypocalcemia, noted on admission, this is due to MADDISON.  No acute need for calcium repletion IV, unless the patient has EKG conduction abnormalities or the patient has symptoms such as carpopedal spasm.  This should improve with dialysis.  Check labs daily.    6.  Normocytic anemia, noted on admission, mild.  Check CBC daily.    7.  Thrombocytopenia, mild, noted on admission.  Unclear etiology.  Check CBC daily.    8. Nicotine dependence- I spent over 3 minutes discussing the need for tobacco cessation. I discussed pharmacotherapy measures for quitting including replacements such as nicotine gum / nicotine patch.   Patient is not ready to quit at this time    Dr. Najjar will assume consultative care tomorrow.      Richmond Archer MD  Nephrology

## 2022-01-16 NOTE — PROGRESS NOTES
Santino from Lab called with critical result of Ca 6.8 (7.4 Corrected) CO2 9 at 0521. Critical lab result read back to Santino.   Dr. Perez notified of critical lab result at 0525. Critical lab result read back by Dr. Hutchinson.

## 2022-01-16 NOTE — TELEPHONE ENCOUNTER
Paged by transfer center / Saeid Cervantes NP at [facility] in [place], [place].      Came in with 7-day h/o cough, weakness, diarrhea. History of HTN. History of remote MADDISON that resolved without HD. Covid vaccinated. Came in hypotensive originally 80-50's, HR in 90's, EKG w/o ischemic issues.     K 5.9 -> D50, insulin, calcium    Creat 17    BP up to 115/68 with fluids  HR 80's  COVID positive    CXR unremarkable.    Bladder scan of ~250cc. Recommend murray catheter placement. If BP remains normal and K still high after fluids, recommend lasix 80mg IV.   Nephrology will see patient upon admission to [facility].    Richmond Archer MD  Nephrology

## 2022-01-16 NOTE — PROGRESS NOTES
Telemetry Shift Summary    Rhythm SR/ST  HR Range   Ectopy -  Measurements 0.14/0.08/0.30        Normal Values  Rhythm SR  HR Range    Measurements 0.12-0.20 / 0.06-0.10  / 0.30-0.52

## 2022-01-16 NOTE — PROCEDURES
Central Line Insertion    Date/Time: 1/16/2022 1:32 PM  Performed by: Erick Oconnor M.D.  Authorized by: Erick Oconnor M.D.     Consent:     Consent obtained:  Verbal and written    Consent given by:  Patient    Risks discussed:  Arterial puncture, bleeding, incorrect placement, infection, pneumothorax and nerve damage    Alternatives discussed:  No treatment and delayed treatment  Pre-procedure details:     Hand hygiene: Hand hygiene performed prior to insertion      Sterile barrier technique: All elements of maximal sterile technique followed      Skin preparation:  ChloraPrep    Skin preparation agent: Skin preparation agent completely dried prior to procedure    Sedation:     Sedation type:  None  Anesthesia:     Anesthesia method:  Local infiltration    Local anesthetic:  Lidocaine 1% w/o epi  Procedure details:     Location:  R internal jugular    Site selection rationale:  Reviewed last cxr, no predominant disease one side or the other, right readibly available    Patient position:  Trendelenburg    Procedural supplies:  Triple lumen    Catheter size: 13 Fr Trialysis catheter.    Landmarks identified: no      Ultrasound guidance: yes      Sterile ultrasound techniques: Sterile gel and sterile probe covers were used      Number of attempts:  1    Successful placement: yes    Post-procedure details:     Post-procedure:  Dressing applied and line sutured    Assessment:  Blood return through all ports, free fluid flow and placement verified by x-ray    Patient tolerance of procedure:  Tolerated well, no immediate complications  Comments:      Kit of trialysis did not have a suture inside nor a biopatch, we used the only available silk 4.0 in the floor. And a new biopatch. Catheter sutured at 18 cm. (20cm length was only available in the hospital)

## 2022-01-16 NOTE — ASSESSMENT & PLAN NOTE
"- Initially had borderline low blood pressure, now his systolic pressure is a little bit greater than 100, hold home amlodipine and lisinopril  -Even though he has COVID, he can certainly tolerate fluid bolus if needed  -Start as needed labetalol and adjust as needed\"    Improved   "

## 2022-01-16 NOTE — H&P
Hospital Medicine History & Physical Note    Date of Service  1/15/2022    Primary Care Physician  Kandis Mckeon P.A.-C.    Consultants  nephrology    Specialist Names: Dr Archer    Code Status  Full Code    Chief Complaint  Fatigue      History of Presenting Illness  Herman Chiang is a 56 y.o. male who presented 1/15/2022 with Rashid.  Patient states he began noticing severe fatigue approximately 1 week ago and this is persisted so he presented to the emergency department.  He states on Wednesday he also began noticing a decreased urine output.  He occasionally has had cough and body aches.  He chronically has abdominal pain but has been unable to get to his GI doctor.  He has had kidney trouble in the past but this resolved.  He does not know his baseline creatinine..    I discussed the plan of care with patient and bedside RN.    Review of Systems  Review of Systems   Constitutional: Positive for malaise/fatigue. Negative for chills and fever.   HENT: Negative for congestion.    Respiratory: Positive for cough. Negative for sputum production, shortness of breath and stridor.    Cardiovascular: Negative for chest pain, palpitations and leg swelling.   Gastrointestinal: Positive for abdominal pain (chronic). Negative for constipation, diarrhea, nausea and vomiting.   Genitourinary: Negative for dysuria and urgency.        Decreased uop   Musculoskeletal: Positive for myalgias. Negative for falls.   Neurological: Negative for dizziness, tingling, loss of consciousness, weakness and headaches.   Psychiatric/Behavioral: Negative for depression and suicidal ideas.   All other systems reviewed and are negative.      Past Medical History   has a past medical history of MADDISON (acute kidney injury) ([facility]) (4/2/2016), Allergy, Anxiety, Cancer ([facility]), Cataract (2012), Chronic midline low back pain with bilateral sciatica, Dehydration (4/2/2016), Depression, GERD (gastroesophageal reflux disease), Hyperlipidemia,  Hypertension, IBD (inflammatory bowel disease), Kidney disease, and Substance abuse (HCC).    Surgical History   has a past surgical history that includes other surgical procedure (Bilateral) and eye surgery (Bilateral, 2012).     Family History  family history includes Arthritis in his brother; Cancer in his father and maternal grandfather; Dementia in his maternal grandmother; Heart Attack in his paternal grandmother; Heart Disease in his paternal grandmother; Hyperlipidemia in his paternal grandmother; Hypertension in his mother and paternal grandmother; No Known Problems in his brother and paternal grandfather; Stroke in his maternal grandmother.   Family history reviewed with patient. There is no family history that is pertinent to the chief complaint.     Social History   reports that he has been smoking cigarettes. He has a 60.00 pack-year smoking history. He has never used smokeless tobacco. He reports current alcohol use of about 12.6 oz of alcohol per week. He reports current drug use. Drugs: Marijuana and Inhaled.    Allergies  Allergies   Allergen Reactions   • Sulfamethoxazole Swelling   • Abilify      Flu like sx about 4 years ago   • Risperidone      Flu like sx 4 years ago   • Omeprazole Unspecified     Pt dislikes the way this drug makes him feel       Medications  Prior to Admission Medications   Prescriptions Last Dose Informant Patient Reported? Taking?   Cholecalciferol (VITAMIN D3) 125 MCG (5000 UT) Cap 1/11/2022 at Unknown time  No No   Sig: Take 1 Capsule by mouth every day.   amLODIPine (NORVASC) 5 MG Tab 1/15/2022 at AM  No No   Sig: Take 1 Tablet by mouth every day. Indications: High Blood Pressure Disorder   buPROPion (WELLBUTRIN XL) 300 MG XL tablet 1/8/2022 at AM  No No   Sig: Take 1 Tablet by mouth every day.   divalproex (DEPAKOTE) 250 MG Tablet Delayed Response Unknown at Unknown  No No   Sig: Take 2 Tablets by mouth 2 times a day.   gabapentin (NEURONTIN) 300 MG Cap Unknown at  Unknown  No No   Sig: Take 1 Capsule by mouth 3 times a day. Indications: Neuropathic Pain   lisinopril (PRINIVIL) 30 MG tablet 1/11/2022 at AM  No No   Sig: Take 1 Tablet by mouth every day. Indications: High Blood Pressure Disorder   pantoprazole (PROTONIX) 40 MG Tablet Delayed Response 1/14/2022 at AM  No No   Sig: Take 1 Tablet by mouth every day.   rosuvastatin (CRESTOR) 10 MG Tab Unknown at PM  No No   Sig: Take 1 Tablet by mouth every evening. Indications: Disease of the Heart or Blood Vessels      Facility-Administered Medications: None       Physical Exam  Temp:  [36.7 °C (98 °F)] 36.7 °C (98 °F)  Pulse:  [104] 104  Resp:  [20] 20  BP: (104)/(47) 104/47  SpO2:  [95 %] 95 %  Blood Pressure: 104/47   Temperature: 36.7 °C (98 °F)   Pulse: (!) 104   Respiration: 20   Pulse Oximetry: 95 %       Physical Exam  Vitals and nursing note reviewed.   Constitutional:       General: He is not in acute distress.     Appearance: He is well-developed. He is not toxic-appearing or diaphoretic.   HENT:      Head: Normocephalic and atraumatic.      Right Ear: External ear normal.      Left Ear: External ear normal.      Nose: Nose normal. No congestion or rhinorrhea.      Mouth/Throat:      Mouth: Mucous membranes are dry.      Pharynx: No oropharyngeal exudate.   Eyes:      General:         Right eye: No discharge.         Left eye: No discharge.      Extraocular Movements: Extraocular movements intact.   Neck:      Trachea: No tracheal deviation.   Cardiovascular:      Rate and Rhythm: Normal rate and regular rhythm.      Heart sounds: No murmur heard.  No friction rub. No gallop.    Pulmonary:      Effort: Pulmonary effort is normal. No respiratory distress.      Breath sounds: Normal breath sounds. No stridor. No wheezing or rales.   Chest:      Chest wall: No tenderness.   Abdominal:      General: Bowel sounds are normal. There is no distension.      Palpations: Abdomen is soft.      Tenderness: There is abdominal  tenderness in the suprapubic area and left lower quadrant.   Musculoskeletal:         General: No tenderness. Normal range of motion.      Cervical back: Normal range of motion and neck supple. No edema or erythema.      Right lower leg: No edema.   Lymphadenopathy:      Cervical: No cervical adenopathy.   Skin:     General: Skin is warm and dry.      Comments: Ichthyosis     Neurological:      General: No focal deficit present.      Mental Status: He is alert and oriented to person, place, and time.      Cranial Nerves: No cranial nerve deficit.   Psychiatric:         Mood and Affect: Mood normal.         Behavior: Behavior normal.         Thought Content: Thought content normal.         Judgment: Judgment normal.         Laboratory:    Labs from the outside facility: White blood cell count 9 hemoglobin 13.7 hematocrit 40.3 platelets 132 sodium 133 potassium 5.9 chloride 102 CO2 12 anion gap 19  creatinine 17.1 glucose 107      No results for input(s): ALTSGPT, ASTSGOT, ALKPHOSPHAT, TBILIRUBIN, DBILIRUBIN, GAMMAGT, AMYLASE, LIPASE, ALB, PREALBUMIN, GLUCOSE in the last 72 hours.      No results for input(s): NTPROBNP in the last 72 hours.      No results for input(s): TROPONINT in the last 72 hours.    Imaging:  US-RENAL    (Results Pending)       EKG:  I have personally reviewed the images and compared with prior images.    Assessment/Plan:  I anticipate this patient will require at least two midnights for appropriate medical management, necessitating inpatient admission.    * ARF (acute renal failure) (HCC)- (present on admission)  Assessment & Plan  - I think this is most likely due to dehydration, IV fluids were given at the outside facility  -Await repeat labs, may need additional IV fluids but considering he does have COVID, avoid ongoing IV fluids for now however patient is not fluid overloaded at all  -Hold home ACE inhibitor  -Avoid nephrotoxic agents  -Obtain urinary electrolytes and renal  ultrasound  -Await additional recommendations per nephrology  -Causing hyperkalemia with no telemetry changes, this is been treated at the outside facility, repeat labs pending, continue to monitor on telemetry  -He does have a Solomon in place but apparently only had 250 mL of urine in his bladder at the time of placement, I do not think urinary obstruction is worsening his kidney function but we will continue to leave the Solomon in place  -He does make urine, does not need emergent hemodialysis at this time    Hyperkalemia- (present on admission)  Assessment & Plan  - Due to acute renal failure  -Treated at the outside facility  -No changes noted on telemetry or EKG  -Await repeat labs  -Continue to monitor on telemetry  -No need for urgent hemodialysis    High anion gap metabolic acidosis- (present on admission)  Assessment & Plan  - Even though this is anion gap acidosis, I think it is due to acute renal failure  -Fluids have been given at the outside facility, await repeat labs    COVID-19 virus infection- (present on admission)  Assessment & Plan  - I think this is causing most of his symptoms, he has been vaccinated but this was last year, no booster  -Keep patient isolated  -Chest x-ray at outside facility showed no acute cardiopulmonary process, no abnormal breath sounds, satting 95% on room air, no need for Decadron  -I think if his creatinine is still elevated, he would benefit from additional IV fluids but I am going to wait for repeat labs  -If ongoing IV fluids are used will use them judiciously    Essential hypertension- (present on admission)  Assessment & Plan  - Initially had borderline low blood pressure, now his systolic pressure is a little bit greater than 100, hold home amlodipine and lisinopril  -Even though he has COVID, he can certainly tolerate fluid bolus if needed  -Start as needed labetalol and adjust as needed    Bipolar affective (HCC)- (present on admission)  Assessment & Plan  -  Continue home meds    GERD (gastroesophageal reflux disease)- (present on admission)  Assessment & Plan  - Continue home PPI    Dehydration- (present on admission)  Assessment & Plan  - I think this is the most likely cause of his renal failure however his renal failure significant, closely monitor for additional causes, await renal ultrasound  -Avoid ongoing IV fluids until repeat labs are obtained    Ichthyosis, 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

## 2022-01-16 NOTE — PROGRESS NOTES
[facility] TRIAGE OFFICER DIRECT ADMISSION REPORT  Transferring facility: [facility]  Transferring physician: RICHAR Vidal  Chief complaint: Acute renal failure  Pertinent history & patient course: Patient is a 56-year-old male with a past medical history of hypertension, basal cell carcinoma who presented with 7-day history of cough, body aches, and was seen to be hypotensive by EMS 80/50.  Patient seen to be in acute renal failure upon arrival to ED at outside hospital and requesst transfer to [facility] for dialysis.    Pertinent imaging & lab results: WBC 9, lactic acid 1.2, blood pressure 115/52, heart rate in the 70s, 98% oxygenation on room air, creatinine 17, , potassium 5.9, anion gap 19, sodium 133, calcium 6.8, albumin 3.0, GFR 3, COVID positive, EKG showing normal sinus rhythm, CBC WNL    K 5.9 -> D50, insulin, calcium given in ED    Code Status: full per transferring provider, I personally verified with the transferring provider patient's code status and the transferring provider has confirmed this with the patient.  Further work up or recommendations per triage officer prior to transfer: consult nephrology  Consultants called prior to transfer and pertinent input from consultants: Dr. Archer, Nephrology recommendations below:     Bladder scan of ~250cc. Recommend murray catheter placement. If BP remains normal and K still high after fluids, recommend lasix 80mg IV.   Nephrology will see patient upon admission to [facility].    Patient accepted for transfer: Yes  Consultants to be called upon arrival: None need to be called upon arrival, Dr. Archer,  nephrology was consulted prior to transfer  Admission status: Inpatient.   Floor requested: Med[facility]      Please inform the triage officer upon arrival of the patient to [facility] for assignment of a hospitalist to perform admission.     For any question or concerns regarding the care of this patient, please reach out to  the assigned hospitalist.

## 2022-01-17 ENCOUNTER — APPOINTMENT (OUTPATIENT)
Dept: RADIOLOGY | Facility: MEDICAL CENTER | Age: 57
DRG: 682 | End: 2022-01-17
Attending: INTERNAL MEDICINE
Payer: MEDICAID

## 2022-01-17 PROBLEM — R78.81 BACTEREMIA: Status: ACTIVE | Noted: 2022-01-17

## 2022-01-17 LAB
ALBUMIN SERPL BCP-MCNC: 2.9 G/DL (ref 3.2–4.9)
BUN SERPL-MCNC: 57 MG/DL (ref 8–22)
C3 SERPL-MCNC: 89.7 MG/DL (ref 87–200)
C4 SERPL-MCNC: 30.9 MG/DL (ref 19–52)
CALCIUM SERPL-MCNC: 7 MG/DL (ref 8.4–10.2)
CHLORIDE SERPL-SCNC: 104 MMOL/L (ref 96–112)
CO2 SERPL-SCNC: 22 MMOL/L (ref 20–33)
CREAT SERPL-MCNC: 4.06 MG/DL (ref 0.5–1.4)
GLUCOSE SERPL-MCNC: 94 MG/DL (ref 65–99)
HCT VFR BLD AUTO: 34.3 % (ref 42–52)
HGB BLD-MCNC: 12.1 G/DL (ref 14–18)
INR PPP: 1.2 (ref 0.87–1.13)
INR PPP: 1.27 (ref 0.87–1.13)
LACTATE BLD-SCNC: 1.5 MMOL/L (ref 0.5–2)
PHOSPHATE SERPL-MCNC: 4.9 MG/DL (ref 2.5–4.5)
POTASSIUM SERPL-SCNC: 3.6 MMOL/L (ref 3.6–5.5)
PROCALCITONIN SERPL-MCNC: 0.21 NG/ML
PROTHROMBIN TIME: 14.3 SEC (ref 12–14.6)
PROTHROMBIN TIME: 14.9 SEC (ref 12–14.6)
SODIUM SERPL-SCNC: 140 MMOL/L (ref 135–145)

## 2022-01-17 PROCEDURE — 86255 FLUORESCENT ANTIBODY SCREEN: CPT

## 2022-01-17 PROCEDURE — 71045 X-RAY EXAM CHEST 1 VIEW: CPT

## 2022-01-17 PROCEDURE — 85014 HEMATOCRIT: CPT

## 2022-01-17 PROCEDURE — 86038 ANTINUCLEAR ANTIBODIES: CPT

## 2022-01-17 PROCEDURE — 5A1D70Z PERFORMANCE OF URINARY FILTRATION, INTERMITTENT, LESS THAN 6 HOURS PER DAY: ICD-10-PCS | Performed by: INTERNAL MEDICINE

## 2022-01-17 PROCEDURE — 700102 HCHG RX REV CODE 250 W/ 637 OVERRIDE(OP): Performed by: INTERNAL MEDICINE

## 2022-01-17 PROCEDURE — 700101 HCHG RX REV CODE 250: Performed by: INTERNAL MEDICINE

## 2022-01-17 PROCEDURE — 85610 PROTHROMBIN TIME: CPT

## 2022-01-17 PROCEDURE — 99233 SBSQ HOSP IP/OBS HIGH 50: CPT | Performed by: INTERNAL MEDICINE

## 2022-01-17 PROCEDURE — 86160 COMPLEMENT ANTIGEN: CPT | Mod: 91

## 2022-01-17 PROCEDURE — 700111 HCHG RX REV CODE 636 W/ 250 OVERRIDE (IP): Performed by: INTERNAL MEDICINE

## 2022-01-17 PROCEDURE — 770020 HCHG ROOM/CARE - TELE (206)

## 2022-01-17 PROCEDURE — 83605 ASSAY OF LACTIC ACID: CPT

## 2022-01-17 PROCEDURE — 87040 BLOOD CULTURE FOR BACTERIA: CPT | Mod: 91

## 2022-01-17 PROCEDURE — 93970 EXTREMITY STUDY: CPT

## 2022-01-17 PROCEDURE — 80069 RENAL FUNCTION PANEL: CPT

## 2022-01-17 PROCEDURE — 86225 DNA ANTIBODY NATIVE: CPT

## 2022-01-17 PROCEDURE — 83516 IMMUNOASSAY NONANTIBODY: CPT

## 2022-01-17 PROCEDURE — 84145 PROCALCITONIN (PCT): CPT

## 2022-01-17 PROCEDURE — 85018 HEMOGLOBIN: CPT

## 2022-01-17 PROCEDURE — A9270 NON-COVERED ITEM OR SERVICE: HCPCS | Performed by: INTERNAL MEDICINE

## 2022-01-17 PROCEDURE — 700105 HCHG RX REV CODE 258: Performed by: INTERNAL MEDICINE

## 2022-01-17 RX ORDER — DEXAMETHASONE 4 MG/1
6 TABLET ORAL DAILY
Status: DISCONTINUED | OUTPATIENT
Start: 2022-01-17 | End: 2022-01-19 | Stop reason: HOSPADM

## 2022-01-17 RX ORDER — LACTOBACILLUS RHAMNOSUS GG 10B CELL
1 CAPSULE ORAL
Status: DISCONTINUED | OUTPATIENT
Start: 2022-01-17 | End: 2022-01-19 | Stop reason: HOSPADM

## 2022-01-17 RX ORDER — SODIUM CHLORIDE, SODIUM LACTATE, POTASSIUM CHLORIDE, CALCIUM CHLORIDE 600; 310; 30; 20 MG/100ML; MG/100ML; MG/100ML; MG/100ML
INJECTION, SOLUTION INTRAVENOUS CONTINUOUS
Status: DISCONTINUED | OUTPATIENT
Start: 2022-01-17 | End: 2022-01-18

## 2022-01-17 RX ORDER — SODIUM CHLORIDE, SODIUM LACTATE, POTASSIUM CHLORIDE, AND CALCIUM CHLORIDE .6; .31; .03; .02 G/100ML; G/100ML; G/100ML; G/100ML
500 INJECTION, SOLUTION INTRAVENOUS
Status: DISCONTINUED | OUTPATIENT
Start: 2022-01-17 | End: 2022-01-19 | Stop reason: HOSPADM

## 2022-01-17 RX ADMIN — BUPROPION HYDROCHLORIDE 150 MG: 150 TABLET, EXTENDED RELEASE ORAL at 17:53

## 2022-01-17 RX ADMIN — HEPARIN SODIUM 5000 UNITS: 5000 INJECTION, SOLUTION INTRAVENOUS; SUBCUTANEOUS at 16:43

## 2022-01-17 RX ADMIN — HEPARIN SODIUM 5000 UNITS: 5000 INJECTION, SOLUTION INTRAVENOUS; SUBCUTANEOUS at 08:03

## 2022-01-17 RX ADMIN — FOLIC ACID 1 MG: 1 TABLET ORAL at 05:10

## 2022-01-17 RX ADMIN — DEXAMETHASONE 6 MG: 4 TABLET ORAL at 16:43

## 2022-01-17 RX ADMIN — AMPICILLIN SODIUM AND SULBACTAM SODIUM 3 G: 2; 1 INJECTION, POWDER, FOR SOLUTION INTRAMUSCULAR; INTRAVENOUS at 09:56

## 2022-01-17 RX ADMIN — SODIUM CHLORIDE, POTASSIUM CHLORIDE, SODIUM LACTATE AND CALCIUM CHLORIDE: 600; 310; 30; 20 INJECTION, SOLUTION INTRAVENOUS at 10:50

## 2022-01-17 RX ADMIN — VANCOMYCIN HYDROCHLORIDE 2000 MG: 500 INJECTION, POWDER, LYOPHILIZED, FOR SOLUTION INTRAVENOUS at 10:40

## 2022-01-17 RX ADMIN — AMPICILLIN SODIUM AND SULBACTAM SODIUM 3 G: 2; 1 INJECTION, POWDER, FOR SOLUTION INTRAMUSCULAR; INTRAVENOUS at 17:53

## 2022-01-17 RX ADMIN — GABAPENTIN 100 MG: 100 CAPSULE ORAL at 11:52

## 2022-01-17 RX ADMIN — AMPICILLIN SODIUM AND SULBACTAM SODIUM 3 G: 2; 1 INJECTION, POWDER, FOR SOLUTION INTRAMUSCULAR; INTRAVENOUS at 23:27

## 2022-01-17 RX ADMIN — GABAPENTIN 100 MG: 100 CAPSULE ORAL at 05:10

## 2022-01-17 RX ADMIN — GABAPENTIN 100 MG: 100 CAPSULE ORAL at 17:53

## 2022-01-17 RX ADMIN — FAMOTIDINE 20 MG: 20 TABLET ORAL at 10:47

## 2022-01-17 RX ADMIN — Medication 1 CAPSULE: at 09:57

## 2022-01-17 RX ADMIN — BUPROPION HYDROCHLORIDE 150 MG: 150 TABLET, EXTENDED RELEASE ORAL at 05:11

## 2022-01-17 RX ADMIN — OXYCODONE 5 MG: 5 TABLET ORAL at 05:13

## 2022-01-17 RX ADMIN — HEPARIN SODIUM 5000 UNITS: 5000 INJECTION, SOLUTION INTRAVENOUS; SUBCUTANEOUS at 23:27

## 2022-01-17 RX ADMIN — THERA TABS 1 TABLET: TAB at 05:10

## 2022-01-17 RX ADMIN — ONDANSETRON 4 MG: 2 INJECTION INTRAMUSCULAR; INTRAVENOUS at 09:56

## 2022-01-17 RX ADMIN — ROSUVASTATIN 10 MG: 10 TABLET, FILM COATED ORAL at 17:53

## 2022-01-17 RX ADMIN — THIAMINE HCL TAB 100 MG 100 MG: 100 TAB at 05:10

## 2022-01-17 RX ADMIN — DIVALPROEX SODIUM 500 MG: 500 TABLET, DELAYED RELEASE ORAL at 23:27

## 2022-01-17 RX ADMIN — DIVALPROEX SODIUM 500 MG: 500 TABLET, DELAYED RELEASE ORAL at 11:52

## 2022-01-17 ASSESSMENT — LIFESTYLE VARIABLES
ANXIETY: MILDLY ANXIOUS
AGITATION: NORMAL ACTIVITY
ORIENTATION AND CLOUDING OF SENSORIUM: ORIENTED AND CAN DO SERIAL ADDITIONS
HEADACHE, FULLNESS IN HEAD: NOT PRESENT
TREMOR: *
AGITATION: NORMAL ACTIVITY
AUDITORY DISTURBANCES: NOT PRESENT
PAROXYSMAL SWEATS: NO SWEAT VISIBLE
NAUSEA AND VOMITING: NO NAUSEA AND NO VOMITING
VISUAL DISTURBANCES: NOT PRESENT
TREMOR: *
NAUSEA AND VOMITING: NO NAUSEA AND NO VOMITING
ORIENTATION AND CLOUDING OF SENSORIUM: ORIENTED AND CAN DO SERIAL ADDITIONS
ANXIETY: MILDLY ANXIOUS
ANXIETY: NO ANXIETY (AT EASE)
HEADACHE, FULLNESS IN HEAD: NOT PRESENT
AUDITORY DISTURBANCES: NOT PRESENT
AUDITORY DISTURBANCES: NOT PRESENT
TOTAL SCORE: 4
PAROXYSMAL SWEATS: NO SWEAT VISIBLE
TREMOR: NO TREMOR
ORIENTATION AND CLOUDING OF SENSORIUM: ORIENTED AND CAN DO SERIAL ADDITIONS
VISUAL DISTURBANCES: NOT PRESENT
TOTAL SCORE: 4
TOTAL SCORE: 0
VISUAL DISTURBANCES: NOT PRESENT
NAUSEA AND VOMITING: NO NAUSEA AND NO VOMITING
AGITATION: NORMAL ACTIVITY
PAROXYSMAL SWEATS: NO SWEAT VISIBLE
HEADACHE, FULLNESS IN HEAD: NOT PRESENT

## 2022-01-17 ASSESSMENT — PATIENT HEALTH QUESTIONNAIRE - PHQ9
SUM OF ALL RESPONSES TO PHQ9 QUESTIONS 1 AND 2: 0
1. LITTLE INTEREST OR PLEASURE IN DOING THINGS: NOT AT ALL
2. FEELING DOWN, DEPRESSED, IRRITABLE, OR HOPELESS: NOT AT ALL

## 2022-01-17 ASSESSMENT — ENCOUNTER SYMPTOMS
CHILLS: 0
COUGH: 0
NAUSEA: 0
SHORTNESS OF BREATH: 0
MYALGIAS: 1
FEVER: 0
VOMITING: 0

## 2022-01-17 ASSESSMENT — PAIN DESCRIPTION - PAIN TYPE: TYPE: ACUTE PAIN

## 2022-01-17 NOTE — PROGRESS NOTES
Telemetry Shift Summary     Rhythm SR  HR Range   Ectopy PVCs/PACs   Measurements 0.14/0.08/0.32           Normal Values  Rhythm SR  HR Range    Measurements 0.12-0.20 / 0.06-0.10  / 0.30-0.52

## 2022-01-17 NOTE — PROGRESS NOTES
"[facility] Medicine Daily Progress Note    Date of Service  1/17/2022    Chief Complaint  Herman Chiang is a 56 y.o. male admitted 1/15/2022 with 1 week history of severe fatigue.      Hospital Course  No notes on file  He has a history of chronic abdominal pain but has not seen a GI doctor and remote history of \"kidney problems\" that \"resolved\". He has a history of hypertension, hyperlipidemia and is on Divalproex (seizures versus psychiatric issues). He presents with severe fatigue. He also has poor urine output.   It appears he was seen at an outside facility or clinic and transferred here to [facility], presumably for abnormal labs please review Dr. Perez's HnP.  At [facility], he is afebrile, low normal blood pressures but tachycardiac and slightly tachypneic.  Labs from outside facility:  White blood cell count 9 hemoglobin 13.7 hematocrit 40.3 platelets 132 sodium 133 potassium 5.9 chloride 102 CO2 12 anion gap 19  creatinine 17.1 glucose 107  Nephrology consulted  US renal ordered.  CoVID Positive but not hypoxic.    Interval Problem Update  1/16. He seemed tremulous to me. He says he drinks beer but then tells me he hasn;t drank for a long time. He has a rash. He is also tachycardic. I ave spoken with  Dr. Archer, who plans dialysis.  1/17. Tolerated dialysis. Patient however was feeling ill although he was yelling about it. Blood pressures  SBP  unchanged. Nurse worried about bleed as stool looks tarry and black. Patient complained of productive cough. No leukocytosis however. Spoke with Nephrology about need for contrast study. Of note patient NOW hypoxic and diagnosed with CoVID.    I have personally seen and examined the patient at bedside. I discussed the plan of care with patient, bedside RN and nephrology.    Consultants/Specialty  nephrology    Code Status  Full Code    Disposition  Patient is not medically cleared for discharge.   Anticipate discharge to TBD.  I have placed the " appropriate orders for post-discharge needs.    Review of Systems  Review of Systems   Unable to perform ROS: Other    Seems unreliable    Physical Exam  Temp:  [36.5 °C (97.7 °F)-37.4 °C (99.3 °F)] 37.4 °C (99.3 °F)  Pulse:  [100-105] 101  Resp:  [20] 20  BP: ()/(48-60) 105/56  SpO2:  [90 %-96 %] 90 %    Physical Exam  Vitals and nursing note reviewed.   Constitutional:       Comments: Overweight   HENT:      Head: Normocephalic and atraumatic.      Right Ear: External ear normal.      Left Ear: External ear normal.      Nose: Nose normal.      Mouth/Throat:      Mouth: Mucous membranes are moist.   Eyes:      General: No scleral icterus.     Conjunctiva/sclera: Conjunctivae normal.   Cardiovascular:      Rate and Rhythm: Regular rhythm. Tachycardia present.      Heart sounds: No murmur heard.  No friction rub. No gallop.    Pulmonary:      Effort: Pulmonary effort is normal.      Breath sounds: Normal breath sounds.   Abdominal:      General: Abdomen is flat. Bowel sounds are normal. There is no distension.      Palpations: Abdomen is soft.      Tenderness: There is no abdominal tenderness. There is no guarding.   Musculoskeletal:         General: Normal range of motion.      Cervical back: Normal range of motion and neck supple.   Skin:     General: Skin is warm.      Findings: Rash (probably from ichthyosis) present.   Neurological:      Mental Status: He is alert and oriented to person, place, and time. Mental status is at baseline.      Coordination: Coordination abnormal (tremulous).   Psychiatric:         Mood and Affect: Mood normal.         Behavior: Behavior normal.         Thought Content: Thought content normal.         Judgment: Judgment normal.      Comments: Anxious         Fluids    Intake/Output Summary (Last 24 hours) at 1/17/2022 0745  Last data filed at 1/17/2022 0515  Gross per 24 hour   Intake 1110.42 ml   Output 3150 ml   Net -2039.58 ml       Laboratory  Recent Labs     01/16/22  0429  "  WBC 6.5  6.6   RBC 4.20*  4.28*   HEMOGLOBIN 13.5*  13.9*   HEMATOCRIT 39.7*  40.7*   MCV 94.5  95.1   MCH 32.1  32.5   MCHC 34.0  34.2   RDW 51.1*  51.5*   PLATELETCT 130*  135*   MPV 11.2  11.5     Recent Labs     01/16/22  0422 01/17/22  0446   SODIUM 138 140   POTASSIUM 6.4* 3.6   CHLORIDE 103 104   CO2 9* 22   GLUCOSE 87 94   * 57*   CREATININE 15.74* 4.06*   CALCIUM 6.8* 7.0*     Recent Labs     01/17/22  0446   INR 1.20*               Imaging  DX-CHEST-FOR LINE PLACEMENT Perform procedure in: Patient's Room   Final Result      1.  Supportive tubing as described above.   2.  No pneumothorax.   3.  LEFT midlung atelectasis versus developing pneumonia.      US-RENAL   Final Result      1.  Unremarkable kidneys.  No hydronephrosis.   2.  Limited evaluation of bladder.           Assessment/Plan  * ARF (acute renal failure), hyperkalemia, CoVID infection/blood cultures+ GPCs, h/o ichthyosis X, alcoholism? (HCC)- (present on admission)  Assessment & Plan  - I think this is most likely due to dehydration, IV fluids were given at the outside facility  -Await repeat labs, may need additional IV fluids but considering he does have COVID, avoid ongoing IV fluids for now however patient is not fluid overloaded at all  -Hold home ACE inhibitor  -Avoid nephrotoxic agents  -Obtain urinary electrolytes and renal ultrasound  -Await additional recommendations per nephrology  -Causing hyperkalemia with no telemetry changes, this is been treated at the outside facility, repeat labs pending, continue to monitor on telemetry  -He does have a Solomon in place but apparently only had 250 mL of urine in his bladder at the time of placement, I do not think urinary obstruction is worsening his kidney function but we will continue to leave the Solomon in place  -He does make urine, does not need emergent hemodialysis at this time\"    Hyperkalemic on labs  Acidotic  Sodium bicarb infusion  Dialysis planned per Dr. Archer  On " "1/17 was not feeling good, somewhat hypotensive. Facility called and mentioned GPCs on blood culture. Patient has respiratory symptoms with an admission procalc of 0.49 Started anitbioitcs empirically to cover GPC bacteremia and pneumonia. CXR, repeat procalcitonin pending.  Now hypoxic. CoVID positive. Add decadron.   Elevated Ddimer. Order US Dopplers. Discussed with Nephrology, cannot order CT Chest contrast while Cr- elevated and elucidating cause of renal failure.  I spoke with nursing to obtain fax or copies of blood cultures from outside hospital. Follow and if confirmed, can consult ID for bacteremia.        Bacteremia  Assessment & Plan  Obtain records from outside facility  If confirmed consult ID  Follow blood cultures from outside facility    Alcohol use  Assessment & Plan  Could be withdrawing  Hyperkalemic so will only goive thiamine, multivitamin and folic acid  Ordered withdrawal protocol    High anion gap metabolic acidosis- (present on admission)  Assessment & Plan  - Even though this is anion gap acidosis, I think it is due to acute renal failure  -Fluids have been given at the outside facility, await repeat labs    Hyperkalemia- (present on admission)  Assessment & Plan  - Due to acute renal failure  -Treated at the outside facility  -No changes noted on telemetry or EKG  -Await repeat labs  -Continue to monitor on telemetry  -No need for urgent hemodialysis\"    dialzing  Resolved.    COVID-19 virus infection- (present on admission)  Assessment & Plan  - I think this is causing most of his symptoms, he has been vaccinated but this was last year, no booster  -Keep patient isolated  -Chest x-ray at outside facility showed no acute cardiopulmonary process, no abnormal breath sounds, satting 95% on room air, no need for Decadron  -I think if his creatinine is still elevated, he would benefit from additional IV fluids but I am going to wait for repeat labs  -If ongoing IV fluids are used will use them " "judiciously\"    Now hypoxic  Start decadron  Dopplers for elevated Ddimer  Treat possible superimposed pneumonia; procalcitonin elevated    Ichthyosis, 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    Mixed hyperlipidemia- (present on admission)  Assessment & Plan  - Continue home statin    Bipolar affective (HCC)- (present on admission)  Assessment & Plan  - Continue home meds    GERD (gastroesophageal reflux disease)- (present on admission)  Assessment & Plan  - Continue home PPI    Dehydration- (present on admission)  Assessment & Plan  - I think this is the most likely cause of his renal failure however his renal failure significant, closely monitor for additional causes, await renal ultrasound  -Avoid ongoing IV fluids until repeat labs are obtained       VTE prophylaxis: heparin ppx    I have performed a physical exam and reviewed and updated ROS and Plan today (1/17/2022). In review of yesterday's note (1/16/2022), there are no changes except as documented above.      "

## 2022-01-17 NOTE — ASSESSMENT & PLAN NOTE
Obtain records from outside facility  If confirmed consult ID  Follow blood cultures from outside facility

## 2022-01-17 NOTE — PROGRESS NOTES
Pharmacy Vancomycin Kinetics Note for 1/17/2022     56 y.o. male on Vancomycin day # 1     Vancomycin Indication (AUC Dosing): Uncomplicated infection  Vancomycin Indication (Two level/Trough based Dosing):      Provider specified end date: 01/24/22    Active Antibiotics (From admission, onward)    Ordered     Ordering Provider       Mon Jan 17, 2022  9:25 AM    01/17/22 0925  vancomycin 2000 mg/500mL IVPB premix 2,000 mg  (vancomycin (VANCOCIN) IV (LD + Maintenance))  ONCE         George Vieira M.D.       Mon Jan 17, 2022  9:10 AM    01/17/22 0910  ampicillin/sulbactam (UNASYN) 3 g in  mL IVPB  (CAP (Simple Sepsis) )  EVERY 6 HOURS         George Vieira M.D.    01/17/22 0910  MD Alert...Vancomycin per Pharmacy  PHARMACY TO DOSE        Question:  Indication(s) for vancomycin?  Answer:  Other (comments)  Comment:  Outside hospital blood cultures positive for GPCs    George Vieira M.D.          Dosing Weight: 78.4 kg (172 lb 13.5 oz)      Admission History: Admitted on 1/15/2022 for ARF (acute renal failure) ([facility]) [N17.9]  Pertinent history: Outside hospital blood cultures positive for GPCs    Allergies:     Sulfamethoxazole, Abilify, Risperidone, and Omeprazole     Pertinent cultures to date:     Results     Procedure Component Value Units Date/Time    Blood Culture [[id number]]     Order Status: Sent Specimen: Blood from Peripheral     Blood Culture [[id number]]     Order Status: Sent Specimen: Blood from Peripheral     URINALYSIS [[id number]]  (Abnormal) Collected: 01/16/22 0941    Order Status: Completed Specimen: Urine, Solomon Cath Updated: 01/16/22 1115     Color Yellow     Character Clear     Specific Gravity 1.015     Ph 6.0     Glucose Negative mg/dL      Ketones Negative mg/dL      Protein 30 mg/dL      Bilirubin Negative     Nitrite Negative     Leukocyte Esterase Negative     Occult Blood Small     Micro Urine Req Microscopic    Narrative:      Enhanced Droplet, Contact, and Eye  "Protection  Collected By: [id number] [facility] RASHAD          Labs:     Estimated Creatinine Clearance: 20.1 mL/min (A) (by C-G formula based on SCr of 4.06 mg/dL (H)).  Recent Labs     22  042   WBC 6.5  6.6   NEUTSPOLYS 79.60*     Recent Labs     22  0422 22  0446   * 57*   CREATININE 15.74* 4.06*   ALBUMIN 3.2 2.9*       Intake/Output Summary (Last 24 hours) at 2022 0926  Last data filed at 2022 0800  Gross per 24 hour   Intake 1350.42 ml   Output 3150 ml   Net -1799.58 ml      BP (!) 94/54   Pulse 96   Temp 37.5 °C (99.5 °F) (Oral)   Resp 20   Ht 1.651 m (5' 5\")   Wt 78.4 kg (172 lb 13.5 oz)   SpO2 95%  Temp (24hrs), Av.3 °C (99.1 °F), Min:36.8 °C (98.3 °F), Max:37.5 °C (99.5 °F)      List concerns for Vancomycin clearance:     MADDISON    Pharmacokinetics:      AUC kinetics:   Ke (hr ^-1): 0.0211 hr^-1  Half life: 32.85 hr  Clearance: 1.075  Estimated TDD: 537.5  Estimated Dose: 782  Estimated interval: 34.9    Two level kinetics:   Ke (hr ^-1):    Half life:    Vd:    Calculated AUC:   mg·hr/L    Trough kinetics:   No results for input(s): VANCOTROUGH, VANCOPEAK, VANCORANDOM in the last 72 hours.    A/P:     -  Vancomycin dose: Load with 2000 mg X 1, Anticipate pulse dosing    -  Next vancomycin level(s):     - Vt @ 09:30     -  Predicted vancomycin AUC from initial AUC test calculator: 465 mg·hr/L    -  Comments: Poor kidney function.  Anticipate pulse dosing.    Jyoti Ndiaye [facility]  "

## 2022-01-17 NOTE — PROGRESS NOTES
Patient does not want to move in bed as everything hurts him. Unable to do skin check as patient does not want to be in pain and everything the nurse does hurts the patient.

## 2022-01-17 NOTE — CARE PLAN
The patient is Stable - Low risk of patient condition declining or worsening    Shift Goals  Clinical Goals: Decrease potassium and creatinine  Patient Goals: rest    Progress made toward(s) clinical / shift goals:  Dialysis completed earlier on day shift. Monitor creatinine and potassium. Cluster care to allow patient to rest/sleep.    Problem: Knowledge Deficit - Standard  Goal: Patient and family/care givers will demonstrate understanding of plan of care, disease process/condition, diagnostic tests and medications  Outcome: Progressing     Problem: Fall Risk  Goal: Patient will remain free from falls  Outcome: Progressing        Patient is not progressing towards the following goals:N/A

## 2022-01-17 NOTE — PROGRESS NOTES
Nephrology Daily Progress Note    Date of Service  1/17/2022    Chief Complaint  56 y.o. male admitted 1/15/2022 with COVID-19 infection, found in acute kidney injury, hyperkalemic, underwent urgent dialysis yesterday.    Interval Problem Update  Patient still complaining of generalized ache, weakness, mild shortness of breath.    Review of Systems  Review of Systems   Constitutional: Positive for malaise/fatigue. Negative for chills and fever.   Respiratory: Negative for cough and shortness of breath.    Cardiovascular: Negative for chest pain and leg swelling.   Gastrointestinal: Negative for nausea and vomiting.   Genitourinary: Negative for dysuria, frequency and urgency.   Musculoskeletal: Positive for myalgias.   All other systems reviewed and are negative.       Physical Exam  Temp:  [36.7 °C (98 °F)-37.5 °C (99.5 °F)] 36.9 °C (98.5 °F)  Pulse:  [] 92  Resp:  [18-22] 18  BP: ()/(48-63) 114/63  SpO2:  [83 %-95 %] 93 %    Physical Exam  Vitals and nursing note reviewed.   Constitutional:       General: He is awake.      Appearance: He is ill-appearing.   HENT:      Head: Normocephalic and atraumatic.      Right Ear: External ear normal.      Left Ear: External ear normal.      Nose: Nose normal.      Mouth/Throat:      Pharynx: No oropharyngeal exudate or posterior oropharyngeal erythema.   Eyes:      General:         Right eye: No discharge.         Left eye: No discharge.      Conjunctiva/sclera: Conjunctivae normal.   Cardiovascular:      Rate and Rhythm: Normal rate and regular rhythm.   Pulmonary:      Effort: Pulmonary effort is normal. No respiratory distress.      Breath sounds: Normal breath sounds. No wheezing.   Abdominal:      General: Abdomen is flat. Bowel sounds are normal.   Musculoskeletal:         General: No tenderness.      Cervical back: No rigidity. No muscular tenderness.      Right lower leg: No edema.      Left lower leg: No edema.   Skin:     General: Skin is warm and dry.       Coloration: Skin is not jaundiced.      Findings: No lesion or rash.   Neurological:      General: No focal deficit present.      Mental Status: He is alert and oriented to person, place, and time. Mental status is at baseline.   Psychiatric:         Mood and Affect: Mood normal.         Behavior: Behavior normal.         Thought Content: Thought content normal.         Fluids    Intake/Output Summary (Last 24 hours) at 1/17/2022 1238  Last data filed at 1/17/2022 1200  Gross per 24 hour   Intake 1540 ml   Output 3400 ml   Net -1860 ml       Laboratory  Recent Labs     01/16/22  0422 01/17/22  0446   WBC 6.5  6.6  --    RBC 4.20*  4.28*  --    HEMOGLOBIN 13.5*  13.9* 12.1*   HEMATOCRIT 39.7*  40.7* 34.3*   MCV 94.5  95.1  --    MCH 32.1  32.5  --    MCHC 34.0  34.2  --    RDW 51.1*  51.5*  --    PLATELETCT 130*  135*  --    MPV 11.2  11.5  --      Recent Labs     01/16/22  0422 01/17/22  0446   SODIUM 138 140   POTASSIUM 6.4* 3.6   CHLORIDE 103 104   CO2 9* 22   GLUCOSE 87 94   * 57*   CREATININE 15.74* 4.06*   CALCIUM 6.8* 7.0*     Recent Labs     01/17/22  0446 01/17/22  0956   INR 1.20* 1.27*     No results for input(s): NTPROBNP in the last 72 hours.        Imaging  DX-CHEST-PORTABLE (1 VIEW)   Final Result      Interstitial prominence.      DX-CHEST-FOR LINE PLACEMENT Perform procedure in: Patient's Room   Final Result      1.  Supportive tubing as described above.   2.  No pneumothorax.   3.  LEFT midlung atelectasis versus developing pneumonia.      US-RENAL   Final Result      1.  Unremarkable kidneys.  No hydronephrosis.   2.  Limited evaluation of bladder.            Assessment/Plan  1 MADDISON: Possible ATN from COVID-19 infection, nonoliguric  2 hyperkalemia  3 COVID-19 infection  4 respiratory failure  5 hypoalbuminemia  no acute need for HD today, evaluate daily  Renal diet  Daily labs  Renal dose all meds  Avoid nephrotoxins  Prognosis guarded.  D/W Dr Vieira

## 2022-01-17 NOTE — CARE PLAN
Problem: Nutritional:  Goal: Achieve adequate nutritional intake  Description: Patient will consume >50% of meals  Outcome: Progressing   See RD note. Pt reports appetite improving slowly.

## 2022-01-17 NOTE — PROGRESS NOTES
MD Vieira notified regarding change in VS as follows temperature 99.5F, HR 96 at rest and 120-130s with ambulation, BP 94/54, RR 20, and oxygen saturation 83% on R/A. Patient placed on 3L NC at this time O2 saturation on 3L NC 95%. Patient also has new onset of productive cough with green sputum. MD aware. Patient ambulated to bathroom to have bowel movement, RN noted black loose stools. Patient states he feels much worse than yesterday.     Bedside RN also was called by [facility] and notified that blood cultures are growing gram positive cocci in cluster. MD Vieira notified as well.    Fluids initiated at this time, blood cultures and other labs drawn via phlebotomist. After blood cultures were drawn Unasyn and Vancomycin administered per order (see MAR for details). Chest x-ray also performed.     Frequent vital monitoring initiated at this time (see flowsheet).

## 2022-01-17 NOTE — PROGRESS NOTES
Hd treatment ordered by Dr Archer started at 1605 and ended at 1805 with net uf of 500 ml as tolerated. Newly placed cvc at right IJ patent with good bfr x 2 without problem. Newly placed catheter verified line placement prior to use.

## 2022-01-17 NOTE — PROGRESS NOTES
Patient reported that inside urethra was hurting a lot. Deflated balloon and advanced catheter. Patient reports relief.

## 2022-01-17 NOTE — DIETARY
"Nutrition services: Day 2 of admit.  Herman Chiang is a 56 y.o. male with admitting DX of ARF (acute renal failure)     Consult received for MST of 3 on nutrition screen due to report of unsure wt loss (UBW=81.6 kg (180 lb) and poor PO PTA      Assessment:  Height: 165.1 cm (5' 5\")  Weight: 78.4 kg (172 lb 13.5 oz)(bed scale)  Body mass index is 28.76 kg/m²., BMI classification: overweight  Diet/Intake: renal/refusal x 1 and 25-50% x 1.     Evaluation:   1. ARF most likely due to dehydration. Pt received IV fluids PTA at another facility. Pt is oliguric. Daily dialysis is planned x 3 days due to hyperkalemia that will not improve w/ IV fluids.  2. Weights reviewed in Epic. Pt with wt loss of 4.9 kg (5.9%) x 1 month. Wt loss is significant.   3. Due to department policy for enhanced droplet isolation, RD is not able to visit pt in his room. RD spoke with pt briefly on his cell phone. Nurse was in the room providing a Lovenox shot during call. He said that his appetite is slowly improving. He did not want supplements at this time.   4. Labs: 1/17/22: potassium=3.6 (improved. 6.4 on 1/16), phos=4.9 (H)  5. Meds: thiamine, MVI, folic acid.    Malnutrition Risk: unable to determine    Recommendations/Plan:   1. Encourage intake of >50%  2. Document intake of all meals as % taken in ADL's to provide interdisciplinary communication across all shifts.   3. Monitor weight.  4. Nutrition rep will continue to see patient for ongoing meal and snack preferences.     RD will follow.  "

## 2022-01-17 NOTE — PROGRESS NOTES
4 Eyes Skin Assessment Completed by FRANCISCA Logan and FRANCISCA Rodriguez.    Head WDL  Ears WDL  Nose WDL  Mouth WDL  Neck Icthyosis, Dry, Flakey  Breast/Chest Icthyosis, Dry, Flakey  Shoulder Blades Icthyosis, Dry, Flakey  Spine Icthyosis, Dry, Flakey  (R) Arm/Elbow/Hand Icthyosis, Dry, Flakey  (L) Arm/Elbow/Hand Icthyosis, Dry, Flakey  Abdomen Icthyosis, Dry, Flakey  Groin WDL  Scrotum/Coccyx/Buttocks Redness, Blanching and Excoriation, Icthyosis, Dry, Flakey  (R) Leg Icthyosis, Dry, Flakey  (L) Leg Icthyosis, Dry, Flakey  (R) Heel/Foot/Toe Icthyosis, Dry, Flakey  (L) Heel/Foot/Toe Icthyosis, Dry, Flakey          Devices In Places Tele Box      Interventions In Place N/A    Possible Skin Injury No    Pictures Uploaded Into Epic N/A  Wound Consult Placed N/A  RN Wound Prevention Protocol Ordered No

## 2022-01-18 LAB
ALBUMIN SERPL BCP-MCNC: 2.8 G/DL (ref 3.2–4.9)
BM IGG SER QL IF: NEGATIVE
BUN SERPL-MCNC: 39 MG/DL (ref 8–22)
CALCIUM SERPL-MCNC: 7.3 MG/DL (ref 8.4–10.2)
CHLORIDE SERPL-SCNC: 108 MMOL/L (ref 96–112)
CO2 SERPL-SCNC: 21 MMOL/L (ref 20–33)
CREAT SERPL-MCNC: 1.77 MG/DL (ref 0.5–1.4)
DSDNA AB TITR SER CLIF: 3 IU (ref 0–24)
ERYTHROCYTE [DISTWIDTH] IN BLOOD BY AUTOMATED COUNT: 48.6 FL (ref 35.9–50)
GLUCOSE SERPL-MCNC: 131 MG/DL (ref 65–99)
HCT VFR BLD AUTO: 32.1 % (ref 42–52)
HEMOCCULT STL QL: NEGATIVE
HGB BLD-MCNC: 11.1 G/DL (ref 14–18)
MCH RBC QN AUTO: 32 PG (ref 27–33)
MCHC RBC AUTO-ENTMCNC: 34.6 G/DL (ref 33.7–35.3)
MCV RBC AUTO: 92.5 FL (ref 81.4–97.8)
MYELOPEROXIDASE AB SER-ACNC: 0 AU/ML (ref 0–19)
NUCLEAR IGG SER QL IA: NORMAL
PHOSPHATE SERPL-MCNC: 2.1 MG/DL (ref 2.5–4.5)
PLATELET # BLD AUTO: 148 K/UL (ref 164–446)
PMV BLD AUTO: 10.7 FL (ref 9–12.9)
POTASSIUM SERPL-SCNC: 4.2 MMOL/L (ref 3.6–5.5)
PROTEINASE3 AB SER-ACNC: 1 AU/ML (ref 0–19)
RBC # BLD AUTO: 3.47 M/UL (ref 4.7–6.1)
SODIUM SERPL-SCNC: 141 MMOL/L (ref 135–145)
WBC # BLD AUTO: 4 K/UL (ref 4.8–10.8)

## 2022-01-18 PROCEDURE — 85027 COMPLETE CBC AUTOMATED: CPT

## 2022-01-18 PROCEDURE — A9270 NON-COVERED ITEM OR SERVICE: HCPCS | Performed by: INTERNAL MEDICINE

## 2022-01-18 PROCEDURE — 700105 HCHG RX REV CODE 258: Performed by: INTERNAL MEDICINE

## 2022-01-18 PROCEDURE — 770020 HCHG ROOM/CARE - TELE (206)

## 2022-01-18 PROCEDURE — 700102 HCHG RX REV CODE 250 W/ 637 OVERRIDE(OP): Performed by: INTERNAL MEDICINE

## 2022-01-18 PROCEDURE — 99233 SBSQ HOSP IP/OBS HIGH 50: CPT | Performed by: INTERNAL MEDICINE

## 2022-01-18 PROCEDURE — 82272 OCCULT BLD FECES 1-3 TESTS: CPT

## 2022-01-18 PROCEDURE — 700111 HCHG RX REV CODE 636 W/ 250 OVERRIDE (IP): Performed by: INTERNAL MEDICINE

## 2022-01-18 PROCEDURE — 99232 SBSQ HOSP IP/OBS MODERATE 35: CPT | Performed by: INTERNAL MEDICINE

## 2022-01-18 PROCEDURE — 80069 RENAL FUNCTION PANEL: CPT

## 2022-01-18 PROCEDURE — 97165 OT EVAL LOW COMPLEX 30 MIN: CPT

## 2022-01-18 RX ORDER — AMOXICILLIN AND CLAVULANATE POTASSIUM 500; 125 MG/1; MG/1
1 TABLET, FILM COATED ORAL DAILY
Status: DISCONTINUED | OUTPATIENT
Start: 2022-01-18 | End: 2022-01-19 | Stop reason: HOSPADM

## 2022-01-18 RX ADMIN — DEXAMETHASONE 6 MG: 4 TABLET ORAL at 05:41

## 2022-01-18 RX ADMIN — DIVALPROEX SODIUM 500 MG: 500 TABLET, DELAYED RELEASE ORAL at 11:46

## 2022-01-18 RX ADMIN — GABAPENTIN 100 MG: 100 CAPSULE ORAL at 05:40

## 2022-01-18 RX ADMIN — THIAMINE HCL TAB 100 MG 100 MG: 100 TAB at 05:41

## 2022-01-18 RX ADMIN — THERA TABS 1 TABLET: TAB at 05:41

## 2022-01-18 RX ADMIN — GABAPENTIN 100 MG: 100 CAPSULE ORAL at 11:46

## 2022-01-18 RX ADMIN — GABAPENTIN 100 MG: 100 CAPSULE ORAL at 17:19

## 2022-01-18 RX ADMIN — AMOXICILLIN AND CLAVULANATE POTASSIUM 1 TABLET: 500; 125 TABLET, FILM COATED ORAL at 15:33

## 2022-01-18 RX ADMIN — AMPICILLIN SODIUM AND SULBACTAM SODIUM 3 G: 2; 1 INJECTION, POWDER, FOR SOLUTION INTRAMUSCULAR; INTRAVENOUS at 05:41

## 2022-01-18 RX ADMIN — Medication 1 CAPSULE: at 08:22

## 2022-01-18 RX ADMIN — ROSUVASTATIN 10 MG: 10 TABLET, FILM COATED ORAL at 17:19

## 2022-01-18 RX ADMIN — OXYCODONE 5 MG: 5 TABLET ORAL at 08:22

## 2022-01-18 RX ADMIN — SODIUM CHLORIDE, POTASSIUM CHLORIDE, SODIUM LACTATE AND CALCIUM CHLORIDE: 600; 310; 30; 20 INJECTION, SOLUTION INTRAVENOUS at 00:39

## 2022-01-18 RX ADMIN — FOLIC ACID 1 MG: 1 TABLET ORAL at 05:40

## 2022-01-18 RX ADMIN — BUPROPION HYDROCHLORIDE 150 MG: 150 TABLET, EXTENDED RELEASE ORAL at 05:40

## 2022-01-18 RX ADMIN — HEPARIN SODIUM 5000 UNITS: 5000 INJECTION, SOLUTION INTRAVENOUS; SUBCUTANEOUS at 15:33

## 2022-01-18 RX ADMIN — SODIUM CHLORIDE, POTASSIUM CHLORIDE, SODIUM LACTATE AND CALCIUM CHLORIDE: 600; 310; 30; 20 INJECTION, SOLUTION INTRAVENOUS at 11:45

## 2022-01-18 RX ADMIN — DIVALPROEX SODIUM 500 MG: 500 TABLET, DELAYED RELEASE ORAL at 23:21

## 2022-01-18 RX ADMIN — HEPARIN SODIUM 5000 UNITS: 5000 INJECTION, SOLUTION INTRAVENOUS; SUBCUTANEOUS at 08:22

## 2022-01-18 RX ADMIN — BUPROPION HYDROCHLORIDE 150 MG: 150 TABLET, EXTENDED RELEASE ORAL at 17:19

## 2022-01-18 RX ADMIN — HEPARIN SODIUM 5000 UNITS: 5000 INJECTION, SOLUTION INTRAVENOUS; SUBCUTANEOUS at 23:21

## 2022-01-18 ASSESSMENT — COGNITIVE AND FUNCTIONAL STATUS - GENERAL
HELP NEEDED FOR BATHING: A LITTLE
DRESSING REGULAR LOWER BODY CLOTHING: A LITTLE
DRESSING REGULAR UPPER BODY CLOTHING: A LITTLE
DAILY ACTIVITIY SCORE: 19
PERSONAL GROOMING: A LITTLE
TOILETING: A LITTLE
SUGGESTED CMS G CODE MODIFIER DAILY ACTIVITY: CK

## 2022-01-18 ASSESSMENT — ENCOUNTER SYMPTOMS
NAUSEA: 0
HEADACHES: 0
CHILLS: 0
SHORTNESS OF BREATH: 0
COUGH: 0
FEVER: 0
COUGH: 1
DEPRESSION: 0
MYALGIAS: 0
DIZZINESS: 0
VOMITING: 0
ABDOMINAL PAIN: 0

## 2022-01-18 ASSESSMENT — PAIN DESCRIPTION - PAIN TYPE
TYPE: ACUTE PAIN
TYPE: ACUTE PAIN

## 2022-01-18 ASSESSMENT — ACTIVITIES OF DAILY LIVING (ADL): TOILETING: INDEPENDENT

## 2022-01-18 ASSESSMENT — GAIT ASSESSMENTS: DISTANCE (FEET): 15

## 2022-01-18 NOTE — DISCHARGE PLANNING
Anticipated Discharge Disposition: Home with DME    Action: Discussed in IDDR; anticipate discharge in 1-2 days. Pt may need home oxygen at discharge, currently on 3L. Awaiting PT OT evaluation for discharge recommendations.     Barriers to Discharge:   Medical clearance  Oxygen needs 3L  PT OT evaluation pending    Plan: HCM will follow and assist with discharge plan.

## 2022-01-18 NOTE — CARE PLAN
The patient is Watcher - Medium risk of patient condition declining or worsening    Shift Goals  Clinical Goals: monitor labs, I&O  Patient Goals: IS, chair for two meals    Progress made toward(s) clinical / shift goals:        Problem: Knowledge Deficit - Standard  Goal: Patient and family/care givers will demonstrate understanding of plan of care, disease process/condition, diagnostic tests and medications  Outcome: Progressing  Note: Discussed POC with patient including labs, medications, and I&O. Patient agrees with plan and verbalizes understanding.      Problem: Fall Risk  Goal: Patient will remain free from falls  Outcome: Progressing       Patient is not progressing towards the following goals:

## 2022-01-18 NOTE — PROGRESS NOTES
1315: Murray catheter removed per protocol. Pt tolerated well. Right IJ temporary catheter removed per physician order. Pt tolerated well, direct pressure applied for 20 minutes per order, no complications observed.    1330: Pt voided after murray removal. Instructed to continue drinking plenty of fluids.

## 2022-01-18 NOTE — THERAPY
"Occupational Therapy   Initial Evaluation     Patient Name: Herman Chiang  Age:  56 y.o., Sex:  male  Medical Record #: [id number]  Today's Date: 1/18/2022     Precautions  Precautions: Fall Risk  Comments: Steadier with FWW but declines need. States will use SPC at home    Assessment    Patient is 56 y.o. male with a diagnosis of COVID-19. Additional factors influencing patient status / progress: MADDISON, depression, GERD, HLD, HTN. Pt lives alone and was reportedly I with all ADLs/IADLs, has a SPC but ambulates without AD use, and has a car but prefers to walk at PLOF. He presents today closely at baseline level, was able to perform most ADLs and functional mob with FWW/HHA use at SBA/spv level. Pt was noted to be steadier with FWW use than HHA, though declined need. Pt states using FWW just \"confuses\" him and reassures OT he will be using SPC at home upon dc. Pt also did desature to 88% with activity, though immediately regulated back to > 90% within 1 min rest and at 3L O2. Patient will not be actively followed for occupational therapy services at this time, however may be seen if requested by physician for 1 more visit within 30 days to address any discharge or equipment needs.       Plan    Recommend Occupational Therapy for DC needs only.     DC Equipment Recommendations: None  Discharge Recommendations: Anticipate that the patient will have no further occupational therapy needs after discharge from the hospital     Subjective    \"But first... can I use the bathroom?\"     Objective       01/18/22 1218   Prior Living Situation   Prior Services None   Housing / Facility 1 Story Apartment / Condo  (2nd floor, no elevator access)   Steps Into Home 16   Steps In Home 0   Rail Both Rail (Steps into Home)   Elevator No   Bathroom Set up Bathtub / Shower Combination;Grab Bars  (handheld)   Equipment Owned Single Point Cane;Grab Bar(s) In Tub / Shower;Hand Held Shower   Lives with - Patient's Self Care Capacity Alone and " "Able to Care For Self   Prior Level of ADL Function   Self Feeding Independent   Grooming / Hygiene Independent   Bathing Independent   Dressing Independent   Toileting Independent   Prior Level of IADL Function   Medication Management Independent   Laundry Independent   Kitchen Mobility Independent   Finances Independent   Home Management Independent   Shopping Independent   Prior Level Of Mobility Independent Without Device in Community;Independent Without Device in Home   Driving / Transportation Driving Independent;Walks   Occupation (Pre-Hospital Vocational) Retired Due To Disability   Comments has a SPC but does not use   History of Falls   History of Falls No   Precautions   Precautions Fall Risk   Comments Steadier with FWW but declines need. States will use SPC at home   Vitals   Pulse Oximetry 92 %   O2 (LPM) 3   O2 Delivery Device Nasal Cannula   Vitals Comments desats to 88%, but regulates back to >90 within 1 minute rest   Pain   Pain Scales 0 to 10 Scale    Intervention Declines   Pain 0 - 10 Group   Pain Rating Scale (NPRS) 0   Therapist Pain Assessment Post Activity Pain Same as Prior to Activity   Non Verbal Descriptors   Non Verbal Scale  Calm;Unlabored Breathing   Cognition    Cognition / Consciousness X   Level of Consciousness Alert   Safety Awareness Impulsive;Impaired   Comments denies need for FWW, states it \"confuses\" him   Active ROM Upper Body   Active ROM Upper Body  WDL   Dominant Hand Right   Strength Upper Body   Upper Body Strength  WDL   Balance Assessment   Sitting Balance (Static) Good   Sitting Balance (Dynamic) Fair +   Standing Balance (Static) Fair   Standing Balance (Dynamic) Fair -   Weight Shift Sitting Good   Weight Shift Standing Fair   Comments steadier with FWW vs HHA   Bed Mobility    Supine to Sit Supervised   Sit to Supine Supervised   Scooting Supervised   Rolling Supervised   Comments HOB elevated, rails not utilized   ADL Assessment   Grooming Standing;Standby " Assist   Upper Body Dressing Supervision   Lower Body Dressing Supervision   Toileting Supervision   How much help from another person does the patient currently need...   Putting on and taking off regular lower body clothing? 3   Bathing (including washing, rinsing, and drying)? 3   Toileting, which includes using a toilet, bedpan, or urinal? 3   Putting on and taking off regular upper body clothing? 3   Taking care of personal grooming such as brushing teeth? 3   Eating meals? 4   6 Clicks Daily Activity Score 19   Functional Mobility   Sit to Stand Standby Assist   Bed, Chair, Wheelchair Transfer Contact Guard Assist   Toilet Transfers Contact Guard Assist   Transfer Method Stand Step   Mobility in room with FWW   Distance (Feet) 15   # of Times Distance was Traveled 2   Activity Tolerance   Sitting in Chair 5 mins toilet   Sitting Edge of Bed 10 mins   Standing 5 mins total   Education Group   Education Provided Role of Occupational Therapist;Adaptive Equipment;Home Safety;Energy Conservation   Role of Occupational Therapist Patient Response Patient;Acceptance;Explanation   Energy Conservation Patient Response Patient;Acceptance;Explanation;Verbal Demonstration   Home Safety Patient Response Acceptance;Explanation;Verbal Demonstration;Patient   Adaptive Equipment Patient Response Patient;Acceptance;Explanation;Demonstration;Reinforcement Needed   Anticipated Discharge Equipment and Recommendations   DC Equipment Recommendations None   Discharge Recommendations Anticipate that the patient will have no further occupational therapy needs after discharge from the hospital   Interdisciplinary Plan of Care Collaboration   IDT Collaboration with  Nursing   Patient Position at End of Therapy In Bed;Call Light within Reach;Tray Table within Reach;Phone within Reach   Collaboration Comments RN aware of OT session and dc recs   Session Information   Date / Session Number  1/18 #1 (DC needs only)   Priority 0

## 2022-01-18 NOTE — PROGRESS NOTES
Bedside report received from FRANCISCA Britt. Assumed pt care. Pt is AOx4, reports low back pain 7/10, which he describes as constant aching and being caused by laying in bed. Denies any other distress at this time. Discussed POC including pain control options, IS usage, proning as much as possible. Pt verbalized understanding. Hourly rounding in place. Fall precautions in place and call light within reach.

## 2022-01-18 NOTE — PROGRESS NOTES
[facility] Medicine Daily Progress Note    Date of Service  1/18/2022    Chief Complaint  Herman Chiang is a 56 y.o. male admitted 1/15/2022 with 1 week history of severe fatigue.      Hospital Course  56-year-old male with PMH hypertension, basal cell carcinoma who presented 1/15/2022 with severe fatigue.  Also reported decreased urine output starting on the Wednesday prior to admission.  At outside facility patient was found to have acute renal failure and gave the patient IV fluids.  Nephrology was consulted and hyperkalemia was treated at the outside facility prior to transfer.  Patient was found to be COVID-19 positive however was on room air and did not need Decadron on admission.  Nephrology was consulted and urgent dialysis was done 1/16.  Patient became hypoxic during hospitalization and started on Decadron.      Interval Problem Update  1/16. He seemed tremulous to me. He says he drinks beer but then tells me he hasn;t drank for a long time. He has a rash. He is also tachycardic. I ave spoken with  Dr. Archer, who plans dialysis.  1/17. Tolerated dialysis. Patient however was feeling ill although he was yelling about it. Blood pressures  SBP  unchanged. Nurse worried about bleed as stool looks tarry and black. Patient complained of productive cough. No leukocytosis however. Spoke with Nephrology about need for contrast study. Of note patient NOW hypoxic and diagnosed with CoVID.  1/18 Renal function appears to be improving Cr 1.77 today. Vitals stable on 3 L NC. Blood cultures negative to date. Will eval for home O2. Discussed with nephrology if renal function stays stable can dc tomorrow. Patient reports overall he is feeling much better and wants to go home.     I have personally seen and examined the patient at bedside. I discussed the plan of care with patient, bedside RN and nephrology.    Consultants/Specialty  nephrology    Code Status  Full Code    Disposition  Patient is not medically  cleared for discharge.   Anticipate discharge to to home with close outpatient follow-up.  I have placed the appropriate orders for post-discharge needs.    Review of Systems  Review of Systems   Constitutional: Negative for chills and fever.   Respiratory: Positive for cough. Negative for shortness of breath.    Cardiovascular: Negative for chest pain.   Gastrointestinal: Negative for abdominal pain, nausea and vomiting.   Genitourinary: Negative for dysuria.   Musculoskeletal: Negative for myalgias.   Skin: Negative for rash.   Neurological: Negative for dizziness and headaches.   Psychiatric/Behavioral: Negative for depression.   All other systems reviewed and are negative.      Physical Exam  Temp:  [36.1 °C (97 °F)-36.9 °C (98.4 °F)] 36.1 °C (97 °F)  Pulse:  [63-81] 63  Resp:  [16-18] 18  BP: (101-135)/(64-69) 120/64  SpO2:  [92 %-94 %] 92 %    Physical Exam  Vitals and nursing note reviewed.   Constitutional:       Comments: Overweight   HENT:      Head: Normocephalic and atraumatic.   Eyes:      General: No scleral icterus.     Conjunctiva/sclera: Conjunctivae normal.   Cardiovascular:      Rate and Rhythm: Normal rate and regular rhythm.      Heart sounds: No murmur heard.  No friction rub. No gallop.    Pulmonary:      Effort: Pulmonary effort is normal.      Breath sounds: Normal breath sounds.   Abdominal:      General: Abdomen is flat. There is no distension.      Palpations: Abdomen is soft.      Tenderness: There is no abdominal tenderness. There is no guarding.   Musculoskeletal:         General: Normal range of motion.      Cervical back: Normal range of motion and neck supple.   Skin:     General: Skin is warm.      Findings: Rash (probably from ichthyosis) present.   Neurological:      Mental Status: He is alert and oriented to person, place, and time. Mental status is at baseline.      Motor: No weakness.   Psychiatric:         Mood and Affect: Mood normal.         Behavior: Behavior normal.       Comments: Anxious         Fluids    Intake/Output Summary (Last 24 hours) at 1/18/2022 1509  Last data filed at 1/18/2022 1314  Gross per 24 hour   Intake 2454 ml   Output 2125 ml   Net 329 ml       Laboratory  Recent Labs     01/16/22 0422 01/17/22 0446 01/18/22  0439   WBC 6.5  6.6  --  4.0*   RBC 4.20*  4.28*  --  3.47*   HEMOGLOBIN 13.5*  13.9* 12.1* 11.1*   HEMATOCRIT 39.7*  40.7* 34.3* 32.1*   MCV 94.5  95.1  --  92.5   MCH 32.1  32.5  --  32.0   MCHC 34.0  34.2  --  34.6   RDW 51.1*  51.5*  --  48.6   PLATELETCT 130*  135*  --  148*   MPV 11.2  11.5  --  10.7     Recent Labs     01/16/22 0422 01/17/22 0446 01/18/22  0439   SODIUM 138 140 141   POTASSIUM 6.4* 3.6 4.2   CHLORIDE 103 104 108   CO2 9* 22 21   GLUCOSE 87 94 131*   * 57* 39*   CREATININE 15.74* 4.06* 1.77*   CALCIUM 6.8* 7.0* 7.3*     Recent Labs     01/17/22 0446 01/17/22  0956   INR 1.20* 1.27*               Imaging  US-EXTREMITY VENOUS LOWER BILAT   Final Result      DX-CHEST-PORTABLE (1 VIEW)   Final Result      Interstitial prominence.      DX-CHEST-FOR LINE PLACEMENT Perform procedure in: Patient's Room   Final Result      1.  Supportive tubing as described above.   2.  No pneumothorax.   3.  LEFT midlung atelectasis versus developing pneumonia.      US-RENAL   Final Result      1.  Unremarkable kidneys.  No hydronephrosis.   2.  Limited evaluation of bladder.           Assessment/Plan  * ARF (acute renal failure), hyperkalemia, CoVID infection/blood cultures+ GPCs, h/o ichthyosis X, alcoholism? (HCC)- (present on admission)  Assessment & Plan  - per nephro likely ATN from covid   -Hold home ACE inhibitor  -Avoid nephrotoxic agents  -Obtain urinary electrolytes and renal ultrasound  -Await additional recommendations per nephrology  -Causing hyperkalemia with no telemetry changes, this is been treated at the outside facility, repeat labs pending, continue to monitor on telemetry  -He does have a Solomon in place but  "apparently only had 250 mL of urine in his bladder at the time of placement, I do not think urinary obstruction is worsening his kidney function but we will continue to leave the Solomon in place    Hyperkalemic on labs  Acidotic  Sodium bicarb infusion  S/p HD on 1/16   -outside lab 1/2 blood culture with contaminant  -change to augmentin, dc vanc     Now hypoxic. CoVID positive. Add decadron.   Elevated Ddimer, no DVT on venous US. Discussed with Nephrology, cannot order CT Chest contrast while Cr- elevated and elucidating cause of renal failure.  Repeat bcx negative       Bacteremia  Assessment & Plan  Obtain records from outside facility  If confirmed consult ID  Follow blood cultures from outside facility    Alcohol use  Assessment & Plan  Could be withdrawing  Hyperkalemic so will only goive thiamine, multivitamin and folic acid  Ordered withdrawal protocol    High anion gap metabolic acidosis- (present on admission)  Assessment & Plan  - Even though this is anion gap acidosis, I think it is due to acute renal failure  -Fluids have been given at the outside facility, await repeat labs    Hyperkalemia- (present on admission)  Assessment & Plan  - Due to acute renal failure  -Treated at the outside facility  -No changes noted on telemetry or EKG  -Await repeat labs  -Continue to monitor on telemetry  -No need for urgent hemodialysis\"    dialzing  Resolved.    COVID-19 virus infection- (present on admission)  Assessment & Plan  - I think this is causing most of his symptoms, he has been vaccinated but this was last year, no booster  -Keep patient isolated  -Chest x-ray at outside facility showed no acute cardiopulmonary process, no abnormal breath sounds, satting 95% on room air, no need for Decadron  -I think if his creatinine is still elevated, he would benefit from additional IV fluids but I am going to wait for repeat labs  -If ongoing IV fluids are used will use them judiciously\"    Now hypoxic  Start " "decadron  Dopplers for elevated Ddimer  Treat possible superimposed pneumonia; procalcitonin elevated    Ichthyosis, 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)  Assessment & Plan  - Chronic, at baseline    Essential hypertension- (present on admission)  Assessment & Plan  - Initially had borderline low blood pressure, now his systolic pressure is a little bit greater than 100, hold home amlodipine and lisinopril  -Even though he has COVID, he can certainly tolerate fluid bolus if needed  -Start as needed labetalol and adjust as needed\"    Improved     Mixed hyperlipidemia- (present on admission)  Assessment & Plan  - Continue home statin    Bipolar affective (HCC)- (present on admission)  Assessment & Plan  - Continue home meds    GERD (gastroesophageal reflux disease)- (present on admission)  Assessment & Plan  - Continue home PPI    Dehydration- (present on admission)  Assessment & Plan  S/p IVF        VTE prophylaxis: heparin ppx    I have performed a physical exam and reviewed and updated ROS and Plan today (1/18/2022). In review of yesterday's note (1/17/2022), there are no changes except as documented above.      "

## 2022-01-18 NOTE — PROGRESS NOTES
Telemetry Shift Summary     Rhythm: SR  HR: 73-87  Ectopy: r PVC, r PAC    Measurements: 0.12/0.08/0.36    Normal Values  Rhythm: SR  HR:   Measurements: 0.12-0.20/0.08-0.10/0.30-0.52

## 2022-01-18 NOTE — PROGRESS NOTES
Telemetry Shift Summary    Rhythm SR-ST  HR Range   Ectopy Rare PVCs  Measurements 0.12/0.08/0.34        Normal Values  Rhythm SR  HR Range    Measurements 0.12-0.20 / 0.06-0.10  / 0.30-0.52

## 2022-01-18 NOTE — PROGRESS NOTES
Nephrology Daily Progress Note    Date of Service  1/18/2022    Chief Complaint  56 y.o. male admitted 1/15/2022 with COVID-19 infection, found in acute kidney injury, hyperkalemic, underwent urgent dialysis yesterday.    Interval Problem Update  Patient still complaining of generalized ache, weakness, mild shortness of breath.  1/18 patient is doing better, no chest pain, his shortness of breath is better  Review of Systems  Review of Systems   Constitutional: Negative for chills, fever and malaise/fatigue.   Respiratory: Negative for cough and shortness of breath.    Cardiovascular: Negative for chest pain and leg swelling.   Gastrointestinal: Negative for nausea and vomiting.   Genitourinary: Negative for dysuria, frequency and urgency.   All other systems reviewed and are negative.       Physical Exam  Temp:  [36.1 °C (97 °F)-36.9 °C (98.5 °F)] 36.1 °C (97 °F)  Pulse:  [63-92] 63  Resp:  [16-18] 18  BP: (101-135)/(63-69) 120/64  SpO2:  [92 %-94 %] 94 %    Physical Exam  Vitals and nursing note reviewed.   Constitutional:       General: He is awake.      Appearance: He is ill-appearing.   HENT:      Head: Normocephalic and atraumatic.      Right Ear: External ear normal.      Left Ear: External ear normal.      Nose: Nose normal.      Mouth/Throat:      Pharynx: No oropharyngeal exudate or posterior oropharyngeal erythema.   Eyes:      General:         Right eye: No discharge.         Left eye: No discharge.      Conjunctiva/sclera: Conjunctivae normal.   Neck:      Comments: R IJ HD catheter  Cardiovascular:      Rate and Rhythm: Normal rate and regular rhythm.   Pulmonary:      Effort: Pulmonary effort is normal. No respiratory distress.      Breath sounds: Normal breath sounds. No wheezing.   Abdominal:      General: Abdomen is flat. Bowel sounds are normal.   Musculoskeletal:         General: No tenderness.      Cervical back: No rigidity. No muscular tenderness.      Right lower leg: No edema.      Left  lower leg: No edema.   Skin:     General: Skin is warm and dry.      Coloration: Skin is not jaundiced.      Findings: No lesion or rash.   Neurological:      General: No focal deficit present.      Mental Status: He is alert and oriented to person, place, and time. Mental status is at baseline.   Psychiatric:         Mood and Affect: Mood normal.         Behavior: Behavior normal.         Thought Content: Thought content normal.         Fluids    Intake/Output Summary (Last 24 hours) at 1/18/2022 1140  Last data filed at 1/18/2022 0600  Gross per 24 hour   Intake 1096 ml   Output 1550 ml   Net -454 ml       Laboratory  Recent Labs     01/16/22 0422 01/17/22 0446 01/18/22  0439   WBC 6.5  6.6  --  4.0*   RBC 4.20*  4.28*  --  3.47*   HEMOGLOBIN 13.5*  13.9* 12.1* 11.1*   HEMATOCRIT 39.7*  40.7* 34.3* 32.1*   MCV 94.5  95.1  --  92.5   MCH 32.1  32.5  --  32.0   MCHC 34.0  34.2  --  34.6   RDW 51.1*  51.5*  --  48.6   PLATELETCT 130*  135*  --  148*   MPV 11.2  11.5  --  10.7     Recent Labs     01/16/22 0422 01/17/22 0446 01/18/22  0439   SODIUM 138 140 141   POTASSIUM 6.4* 3.6 4.2   CHLORIDE 103 104 108   CO2 9* 22 21   GLUCOSE 87 94 131*   * 57* 39*   CREATININE 15.74* 4.06* 1.77*   CALCIUM 6.8* 7.0* 7.3*     Recent Labs     01/17/22 0446 01/17/22  0956   INR 1.20* 1.27*     No results for input(s): NTPROBNP in the last 72 hours.        Imaging  US-EXTREMITY VENOUS LOWER BILAT   Final Result      DX-CHEST-PORTABLE (1 VIEW)   Final Result      Interstitial prominence.      DX-CHEST-FOR LINE PLACEMENT Perform procedure in: Patient's Room   Final Result      1.  Supportive tubing as described above.   2.  No pneumothorax.   3.  LEFT midlung atelectasis versus developing pneumonia.      US-RENAL   Final Result      1.  Unremarkable kidneys.  No hydronephrosis.   2.  Limited evaluation of bladder.            Assessment/Plan  1 MADDISON: Secondary to ATN, creatinine is 1.77 milligrams per deciliter, no  uremic symptoms  2 hyperkalemia: Improved  3 COVID-19 infection  4 respiratory failure  5 hypoalbuminemia  no acute need for HD  Remove hemodialysis catheter  Renal diet  Daily labs  Renal dose all meds  Avoid nephrotoxins  Prognosis guarded.  D/W Dr Chacon

## 2022-01-19 ENCOUNTER — HOSPITAL ENCOUNTER (EMERGENCY)
Facility: MEDICAL CENTER | Age: 57
End: 2022-01-20
Attending: EMERGENCY MEDICINE
Payer: MEDICAID

## 2022-01-19 ENCOUNTER — PHARMACY VISIT (OUTPATIENT)
Dept: PHARMACY | Facility: MEDICAL CENTER | Age: 57
End: 2022-01-19
Payer: COMMERCIAL

## 2022-01-19 VITALS
OXYGEN SATURATION: 93 % | RESPIRATION RATE: 18 BRPM | DIASTOLIC BLOOD PRESSURE: 83 MMHG | SYSTOLIC BLOOD PRESSURE: 140 MMHG | BODY MASS INDEX: 28.8 KG/M2 | WEIGHT: 172.84 LBS | HEART RATE: 64 BPM | TEMPERATURE: 97.5 F | HEIGHT: 65 IN

## 2022-01-19 DIAGNOSIS — L85.3 DRY SKIN: ICD-10-CM

## 2022-01-19 DIAGNOSIS — Q80.9 ICHTHYOSIS: ICD-10-CM

## 2022-01-19 PROBLEM — E87.29 HIGH ANION GAP METABOLIC ACIDOSIS: Status: RESOLVED | Noted: 2022-01-15 | Resolved: 2022-01-19

## 2022-01-19 PROBLEM — R78.81 BACTEREMIA: Status: RESOLVED | Noted: 2022-01-17 | Resolved: 2022-01-19

## 2022-01-19 PROBLEM — N17.9 ARF (ACUTE RENAL FAILURE) (HCC): Status: RESOLVED | Noted: 2022-01-15 | Resolved: 2022-01-19

## 2022-01-19 PROBLEM — E87.5 HYPERKALEMIA: Status: RESOLVED | Noted: 2022-01-15 | Resolved: 2022-01-19

## 2022-01-19 LAB
ALBUMIN SERPL BCP-MCNC: 2.9 G/DL (ref 3.2–4.9)
ANION GAP SERPL CALC-SCNC: 13 MMOL/L (ref 7–16)
BUN SERPL-MCNC: 29 MG/DL (ref 8–22)
CALCIUM SERPL-MCNC: 7.8 MG/DL (ref 8.4–10.2)
CHLORIDE SERPL-SCNC: 108 MMOL/L (ref 96–112)
CO2 SERPL-SCNC: 24 MMOL/L (ref 20–33)
CREAT SERPL-MCNC: 1.43 MG/DL (ref 0.5–1.4)
GLUCOSE SERPL-MCNC: 121 MG/DL (ref 65–99)
PHOSPHATE SERPL-MCNC: 1.9 MG/DL (ref 2.5–4.5)
POTASSIUM SERPL-SCNC: 4.1 MMOL/L (ref 3.6–5.5)
PROCALCITONIN SERPL-MCNC: 0.07 NG/ML
SODIUM SERPL-SCNC: 145 MMOL/L (ref 135–145)

## 2022-01-19 PROCEDURE — 700102 HCHG RX REV CODE 250 W/ 637 OVERRIDE(OP): Performed by: INTERNAL MEDICINE

## 2022-01-19 PROCEDURE — 99232 SBSQ HOSP IP/OBS MODERATE 35: CPT | Performed by: INTERNAL MEDICINE

## 2022-01-19 PROCEDURE — 84145 PROCALCITONIN (PCT): CPT

## 2022-01-19 PROCEDURE — 99239 HOSP IP/OBS DSCHRG MGMT >30: CPT | Performed by: INTERNAL MEDICINE

## 2022-01-19 PROCEDURE — 80069 RENAL FUNCTION PANEL: CPT

## 2022-01-19 PROCEDURE — A9270 NON-COVERED ITEM OR SERVICE: HCPCS | Performed by: INTERNAL MEDICINE

## 2022-01-19 PROCEDURE — RXMED WILLOW AMBULATORY MEDICATION CHARGE: Performed by: INTERNAL MEDICINE

## 2022-01-19 PROCEDURE — 99282 EMERGENCY DEPT VISIT SF MDM: CPT

## 2022-01-19 RX ORDER — AMOXICILLIN AND CLAVULANATE POTASSIUM 500; 125 MG/1; MG/1
1 TABLET, FILM COATED ORAL DAILY
Qty: 1 TABLET | Refills: 0 | Status: SHIPPED | OUTPATIENT
Start: 2022-01-19 | End: 2022-01-20

## 2022-01-19 RX ORDER — DEXAMETHASONE 6 MG/1
6 TABLET ORAL DAILY
Qty: 6 TABLET | Refills: 0 | Status: SHIPPED | OUTPATIENT
Start: 2022-01-20 | End: 2022-01-26

## 2022-01-19 RX ADMIN — GABAPENTIN 100 MG: 100 CAPSULE ORAL at 05:19

## 2022-01-19 RX ADMIN — FOLIC ACID 1 MG: 1 TABLET ORAL at 05:20

## 2022-01-19 RX ADMIN — THERA TABS 1 TABLET: TAB at 05:20

## 2022-01-19 RX ADMIN — THIAMINE HCL TAB 100 MG 100 MG: 100 TAB at 05:19

## 2022-01-19 RX ADMIN — DEXAMETHASONE 6 MG: 4 TABLET ORAL at 05:19

## 2022-01-19 RX ADMIN — BUPROPION HYDROCHLORIDE 150 MG: 150 TABLET, EXTENDED RELEASE ORAL at 05:20

## 2022-01-19 ASSESSMENT — ENCOUNTER SYMPTOMS
NAUSEA: 0
FEVER: 0
VOMITING: 0
CHILLS: 0
COUGH: 0
SHORTNESS OF BREATH: 0

## 2022-01-19 ASSESSMENT — LIFESTYLE VARIABLES
AGITATION: *
NAUSEA AND VOMITING: NO NAUSEA AND NO VOMITING
ORIENTATION AND CLOUDING OF SENSORIUM: ORIENTED AND CAN DO SERIAL ADDITIONS
ANXIETY: *
TREMOR: TREMOR NOT VISIBLE BUT CAN BE FELT, FINGERTIP TO FINGERTIP
TREMOR: TREMOR NOT VISIBLE BUT CAN BE FELT, FINGERTIP TO FINGERTIP
AUDITORY DISTURBANCES: NOT PRESENT
HEADACHE, FULLNESS IN HEAD: VERY MILD
NAUSEA AND VOMITING: NO NAUSEA AND NO VOMITING
HEADACHE, FULLNESS IN HEAD: VERY MILD
ANXIETY: *
TOTAL SCORE: 12
AUDITORY DISTURBANCES: NOT PRESENT
VISUAL DISTURBANCES: NOT PRESENT
VISUAL DISTURBANCES: NOT PRESENT
PAROXYSMAL SWEATS: NO SWEAT VISIBLE
TOTAL SCORE: 13
PAROXYSMAL SWEATS: NO SWEAT VISIBLE
ORIENTATION AND CLOUDING OF SENSORIUM: ORIENTED AND CAN DO SERIAL ADDITIONS
AGITATION: *

## 2022-01-19 ASSESSMENT — PAIN DESCRIPTION - PAIN TYPE: TYPE: ACUTE PAIN

## 2022-01-19 ASSESSMENT — FIBROSIS 4 INDEX: FIB4 SCORE: 1.62

## 2022-01-19 NOTE — PROGRESS NOTES
Patient expressed concerns to me about the need for transportation back home to [facility] upon discharge. Will address this with oncoming day shift RN.

## 2022-01-19 NOTE — PROGRESS NOTES
Telemetry Shift Summary     Rhythm: SB/SR/ST  HR:   Ectopy: r PVC    Measurements: 0.12/0.08/0.40    Normal Values  Rhythm: SR  HR:   Measurements: 0.12-0.20/0.08-0.10/0.30-0.52

## 2022-01-19 NOTE — DISCHARGE SUMMARY
Discharge Summary    CHIEF COMPLAINT ON ADMISSION  No chief complaint on file.      Reason for Admission  Renal Failure     Admission Date  1/15/2022    CODE STATUS  Full Code    HPI & HOSPITAL COURSE  This is a 56 y.o. male here with severe fatigue    56-year-old male with PMH hypertension, basal cell carcinoma who presented 1/15/2022 with severe fatigue.  Also reported decreased urine output starting on the Wednesday prior to admission.  At outside facility patient was found to have acute renal failure and gave the patient IV fluids.  Nephrology was consulted and hyperkalemia was treated at the outside facility prior to transfer.  Patient was found to be COVID-19 positive however was on room air and did not need Decadron on admission.  Nephrology was consulted and urgent dialysis was done 1/16.  Patient became hypoxic during hospitalization and started on Decadron. Renal function continued to improve and nephrology has cleared the patient for discharge. Patient stable on 3 L NC and has been set up for home O2. He has refused the remote home monitoring program. He is to continue his 10 day course of decadron. Patient's vital signs are stable and he is ready for discharge home to follow-up with his primary care physician in 1 to 2 weeks for repeat blood work to check on his kidney function.  Patient is to return to the ER if his condition worsens.    Therefore, he is discharged in good and stable condition to home with close outpatient follow-up.    The patient met 2-midnight criteria for an inpatient stay at the time of discharge.    Discharge Date  1/19/2021    FOLLOW UP ITEMS POST DISCHARGE  Follow up with primary care physician.     DISCHARGE DIAGNOSES  Principal Problem (Resolved):    ARF (acute renal failure), hyperkalemia, CoVID infection/blood cultures+ GPCs, h/o ichthyosis X, alcoholism? (HCC) POA: Yes  Active Problems:    GERD (gastroesophageal reflux disease) POA: Yes    Bipolar affective (HCC) POA: Yes     Mixed hyperlipidemia POA: Yes    Essential hypertension POA: Yes    Ichthyosis, X-linked POA: Yes    COVID-19 virus infection POA: Yes    Alcohol use POA: Yes  Resolved Problems:    Dehydration POA: Yes    Hyperkalemia POA: Yes    High anion gap metabolic acidosis POA: Yes    Bacteremia POA: Unknown      FOLLOW UP  Future Appointments   Date Time Provider Department Center   2/18/2022  9:30 AM Kandis Mckeon P.A.-C. [facility] None     Kandis Mckeon P.A.-C.  [address]  [facility], [place] [address]  [phone]      Follow up with primary care in 1-2 weeks.      MEDICATIONS ON DISCHARGE     Medication List      START taking these medications      Instructions   amoxicillin-clavulanate 500-125 MG Tabs  Commonly known as: AUGMENTIN   Take 1 Tablet by mouth every day for 1 day.  Dose: 1 Tablet     dexamethasone 6 MG Tabs  Start taking on: January 20, 2022  Commonly known as: DECADRON   Take 1 Tablet by mouth every day for 6 days.  Dose: 6 mg        CONTINUE taking these medications      Instructions   amLODIPine 5 MG Tabs  Commonly known as: NORVASC   Take 1 Tablet by mouth every day. Indications: High Blood Pressure Disorder  Dose: 5 mg     buPROPion 300 MG XL tablet  Commonly known as: WELLBUTRIN XL   Take 1 Tablet by mouth every day.  Dose: 300 mg     divalproex 250 MG Tbec  Commonly known as: DEPAKOTE   Take 2 Tablets by mouth 2 times a day.  Dose: 500 mg     gabapentin 300 MG Caps  Commonly known as: NEURONTIN   Take 1 Capsule by mouth 3 times a day. Indications: Neuropathic Pain  Dose: 300 mg     lisinopril 30 MG tablet  Commonly known as: PRINIVIL   Take 1 Tablet by mouth every day. Indications: High Blood Pressure Disorder  Dose: 30 mg     pantoprazole 40 MG Tbec  Commonly known as: PROTONIX   Take 1 Tablet by mouth every day.  Dose: 40 mg     rosuvastatin 10 MG Tabs  Commonly known as: CRESTOR   Take 1 Tablet by mouth every evening. Indications: Disease of the Heart or Blood  Vessels  Dose: 10 mg     vitamin D3 125 MCG (5000 UT) Caps   Take 1 Capsule by mouth every day.  Dose: 1 Capsule            Allergies  Allergies   Allergen Reactions   • Sulfamethoxazole Swelling   • Abilify      Flu like sx about 4 years ago   • Risperidone      Flu like sx 4 years ago   • Omeprazole Unspecified     Pt dislikes the way this drug makes him feel       DIET  Orders Placed This Encounter   Procedures   • Diet Order Diet: Renal     Standing Status:   Standing     Number of Occurrences:   1     Order Specific Question:   Diet:     Answer:   Renal [8]       ACTIVITY  As tolerated.  Weight bearing as tolerated    CONSULTATIONS  Nephrology     PROCEDURES  Central line insertion     LABORATORY  Lab Results   Component Value Date    SODIUM 145 01/19/2022    POTASSIUM 4.1 01/19/2022    CHLORIDE 108 01/19/2022    CO2 24 01/19/2022    GLUCOSE 121 (H) 01/19/2022    BUN 29 (H) 01/19/2022    CREATININE 1.43 (H) 01/19/2022        Lab Results   Component Value Date    WBC 4.0 (L) 01/18/2022    HEMOGLOBIN 11.1 (L) 01/18/2022    HEMATOCRIT 32.1 (L) 01/18/2022    PLATELETCT 148 (L) 01/18/2022        Total time of the discharge process exceeds 35 minutes.

## 2022-01-19 NOTE — DISCHARGE PLANNING
Agency/Facility Name: [facility]  Spoke To: Candelaria  Outcome: Candelaria called to see when the pt was going to discharge.  ADDY looked at notes and stated the pt discharged AMA.  Candelaria will follow up with  as to what they do when a pt leaves AMA.    Agency/Facility Name: [facility]  Spoke To: Candelaria  Outcome: Candelaria wanted to clarify they could cancel the DME O2 order since pt left AMA.   DPA stated referral could be canceled since the pt left and has not returned to the hospital.

## 2022-01-19 NOTE — RESPIRATORY CARE
REMOTE MONITORING PROGRAM by RESPIRATORY THERAPY  2022 at 12:45 PM by Haydee Burch, RRT     Patient's name:  Herman Chiang        MRN: [id number]       : 1965  Covid         Patient did not want Remote Monitoring program device and doesn't have a smart phone, also left AMA.

## 2022-01-19 NOTE — CARE PLAN
The patient is Stable - Low risk of patient condition declining or worsening    Shift Goals  Clinical Goals: monitor labs, I&O  Patient Goals: IS, pain control    Progress made toward(s) clinical / shift goals:        Problem: Knowledge Deficit - Standard  Goal: Patient and family/care givers will demonstrate understanding of plan of care, disease process/condition, diagnostic tests and medications  Outcome: Progressing  Note: Discussed POC with patient including I&O, labs, and medications. Patient agrees with plan and verbalizes understanding.      Problem: Fall Risk  Goal: Patient will remain free from falls  Outcome: Progressing       Patient is not progressing towards the following goals:

## 2022-01-19 NOTE — DISCHARGE PLANNING
DC Transport Scheduled    Received request at: 1000    Transport Company Scheduled:  [employer]  Spoke with Bisi at [facility] to schedule transport.She stated That PT cannot be transported in a wheel chair if on oyxgen  [facility] Trip #: [id number]    Scheduled Date: 01/19/22  Scheduled Time: 1500    Destination: [address] [facility]    Notified care team of scheduled transport via Vo[employer].     If there are any changes needed to the DC transportation scheduled, please contact [facility] at ext. [phone] between the hours of 1654-1444 Mon-Fri. If outside those hours, contact the ED Case Manager at ext. [phone].

## 2022-01-19 NOTE — FACE TO FACE
"Face to Face Note  -  Durable Medical Equipment    Haley Chacon D.O. - NPI: [id number]  I certify that this patient is under my care and that they had a durable medical equipment(DME)face to face encounter by myself that meets the physician DME face-to-face encounter requirements with this patient on:    Date of encounter:   Patient:                    MRN:                       YOB: 2020  Herman Chiang  [id number]  1965     The encounter with the patient was in whole, or in part, for the following medical condition, which is the primary reason for durable medical equipment:  Covid-19 Infection    I certify that, based on my findings, the following durable medical equipment is medically necessary:  Oxygen.    HOME O2 Saturation Measurements:(Values must be present for Home Oxygen orders)  Room air sat at rest: 90  Room air sat with amb: 84  With liters of O2: 3, O2 sat at rest with O2: 95  With Liters of O2: 3, O2 sat with amb with O2 : 92  Is the patient mobile?: Yes    My Clinical findings support the need for the above equipment due to:  Hypoxia    Supporting Symptoms: The patient requires supplemental oxygen, as the following interventions have been tried with limited or no improvement: \"Oral and/or IV steroids, \"Ambulation with oximetry and \"Incentive spirometry    If patient feels more short of breath, they can go up to 6 liters per minute and contact healthcare provider.  "

## 2022-01-19 NOTE — DISCHARGE PLANNING
Anticipated Discharge Disposition: Home with oxygen      Action: CM RN received notification pt needing oxygen. CM RN met with pt at bedside, received choice for [facility] as pt lives in [place]. Received notification pt needing ride how to [place], discussed with pt options. Pt becoming very upset stating, stating his mom lives in [place]. Will look into options as pt is covid positive. Pt does have ride benefits with medicaid, will contact to see if benefits can be used in this case.       1000: Malcolm stating [facility] will take Covid pts, they use [facility]. [employer] form completed and sent to, pending confirmation, requested 1500.    1031: Called [facility] to follow. Does not take Medicaid FFS, manually faxed to [facility], spoke with malcolm pt will need to go via [email or web address], completing [employer] PCS form.     1047: Followed up with [facility] does not take Medicaid FFS as of now. Called [facility] to see if take pts insurance, stating the do and they service [place]. Will follow in an hour to check if pt is accepted.      Barriers to Discharge: oxygen setup, ride home     Plan: Case management to follow up with malcolm about transport options.

## 2022-01-19 NOTE — PROGRESS NOTES
Tele strip printed at 6193     Rhythm: SB   HR: 57  Measurements: 0.12/0.10/0.40        Telemetry Shift Summary     Rhythm: SR/SB  HR Range: 50's-60's  Ectopy: Rare PVC     Telemetry monitoring strips placed in pt's chart.

## 2022-01-19 NOTE — PROGRESS NOTES
Nephrology Daily Progress Note    Date of Service  1/19/2022    Chief Complaint  56 y.o. male admitted 1/15/2022 with COVID-19 infection, found in acute kidney injury, hyperkalemic, underwent urgent dialysis yesterday.    Interval Problem Update  Patient still complaining of generalized ache, weakness, mild shortness of breath.  1/18 patient is doing better, no chest pain, his shortness of breath is better  1/19 patient is doing better anxious to go home,.  Review of Systems  Review of Systems   Constitutional: Negative for chills, fever and malaise/fatigue.   Respiratory: Negative for cough and shortness of breath.    Cardiovascular: Negative for chest pain and leg swelling.   Gastrointestinal: Negative for nausea and vomiting.   Genitourinary: Negative for dysuria, frequency and urgency.        Physical Exam  Temp:  [36.4 °C (97.5 °F)-37.4 °C (99.3 °F)] 36.4 °C (97.5 °F)  Pulse:  [59-83] 64  Resp:  [16-18] 18  BP: (116-140)/(55-83) 140/83  SpO2:  [93 %-98 %] 93 %    Physical Exam  Vitals and nursing note reviewed.   Constitutional:       General: He is not in acute distress.     Appearance: He is not ill-appearing.   HENT:      Head: Normocephalic and atraumatic.      Right Ear: External ear normal.      Left Ear: External ear normal.      Nose: Nose normal.   Eyes:      General:         Right eye: No discharge.         Left eye: No discharge.      Conjunctiva/sclera: Conjunctivae normal.   Cardiovascular:      Rate and Rhythm: Normal rate and regular rhythm.      Heart sounds: No murmur heard.      Pulmonary:      Effort: Pulmonary effort is normal. No respiratory distress.      Breath sounds: Normal breath sounds. No wheezing.   Musculoskeletal:         General: No tenderness or deformity.      Right lower leg: No edema.      Left lower leg: No edema.   Skin:     General: Skin is warm.   Neurological:      General: No focal deficit present.      Mental Status: He is alert and oriented to person, place, and time.    Psychiatric:         Mood and Affect: Mood normal.         Behavior: Behavior normal.         Fluids    Intake/Output Summary (Last 24 hours) at 1/19/2022 1333  Last data filed at 1/18/2022 2349  Gross per 24 hour   Intake 800 ml   Output 325 ml   Net 475 ml       Laboratory  Recent Labs     01/17/22  0446 01/18/22  0439   WBC  --  4.0*   RBC  --  3.47*   HEMOGLOBIN 12.1* 11.1*   HEMATOCRIT 34.3* 32.1*   MCV  --  92.5   MCH  --  32.0   MCHC  --  34.6   RDW  --  48.6   PLATELETCT  --  148*   MPV  --  10.7     Recent Labs     01/17/22  0446 01/18/22  0439 01/19/22  0305   SODIUM 140 141 145   POTASSIUM 3.6 4.2 4.1   CHLORIDE 104 108 108   CO2 22 21 24   GLUCOSE 94 131* 121*   BUN 57* 39* 29*   CREATININE 4.06* 1.77* 1.43*   CALCIUM 7.0* 7.3* 7.8*     Recent Labs     01/17/22  0446 01/17/22  0956   INR 1.20* 1.27*     No results for input(s): NTPROBNP in the last 72 hours.        Imaging  US-EXTREMITY VENOUS LOWER BILAT   Final Result      DX-CHEST-PORTABLE (1 VIEW)   Final Result      Interstitial prominence.      DX-CHEST-FOR LINE PLACEMENT Perform procedure in: Patient's Room   Final Result      1.  Supportive tubing as described above.   2.  No pneumothorax.   3.  LEFT midlung atelectasis versus developing pneumonia.      US-RENAL   Final Result      1.  Unremarkable kidneys.  No hydronephrosis.   2.  Limited evaluation of bladder.            Assessment/Plan  1 MADDISON: Secondary to ATN:improved  2 hyperkalemia: Resolved  3 COVID-19 infection  4 respiratory failure  5 hypoalbuminemia  no need for HD  Renal diet  Renal dose all meds  Avoid nephrotoxins  OK to D/C  D/W Dr Chacon

## 2022-01-19 NOTE — PROGRESS NOTES
Pt left AMA around 1120 called security and looked for pt. Unable to find pt. Reported pt had IV still in place to [place] [employer].     Herman Chiang patient has chosen to leave the hospital against medical advice. The attending physician has not discharged the patient. Patient is not a risk to himself or others. I have discussed with the patient the following:  Physician has not determined patient is ready for discharge.      Discharge against medical advice form has been Patient refused to sign.      Patient is a greater than 60 years old  and a referral to  was made.    Attending physician has been notified.

## 2022-01-19 NOTE — CARE PLAN
The patient is Stable - Low risk of patient condition declining or worsening    Shift Goals  Clinical Goals: monitor labs, I&Os, control pain  Patient Goals: IS, pain control    Progress made toward(s) clinical / shift goals: Stool sample collected and sent, negative for occult blood. I&Os recorded, pt reports pain at tolerable levels.    Patient is not progressing towards the following goals: n/a

## 2022-01-19 NOTE — PROGRESS NOTES
Patient very agitated on first assessment. CIWA score of 12. Withdrawal explained to patient, ativan offered per CIWA scale / prn order. Patient refused ativan even after explanation.

## 2022-01-20 VITALS
TEMPERATURE: 98.9 F | DIASTOLIC BLOOD PRESSURE: 80 MMHG | OXYGEN SATURATION: 95 % | HEART RATE: 95 BPM | BODY MASS INDEX: 30.67 KG/M2 | SYSTOLIC BLOOD PRESSURE: 128 MMHG | RESPIRATION RATE: 18 BRPM | WEIGHT: 184.3 LBS

## 2022-01-20 NOTE — ED NOTES
Pt is discharged. Paperwork explained and all questions answered. This nurse walked with pt to lobby phone and assisted him in calling [facility].

## 2022-01-20 NOTE — ED TRIAGE NOTES
"Chief Complaint   Patient presents with   • Skin Lesion   • Other     Pt arrives to triage C/O AMA (prior to to discharge) today from upstairs, states that he has COVID and needs a ride back to [place]. Pt reports that he also has a \"skin condition,\" and he was unable to shower prior to discharge and now his skin is so abraded and the  skin on his buttocks is bleeding.  IV in place at time of triage. IV d/c'd by this RN.    NAD.  Pt to COVID lounge.  /90   Pulse 98   Temp 36.8 °C (98.2 °F) (Temporal)   Resp (!) 22   Wt 83.6 kg (184 lb 4.9 oz)   SpO2 94%   Pt informed of wait times. Educated on triage process.  Asked to return to triage RN for any new or worsening of symptoms. Thanked for patience.        "

## 2022-01-20 NOTE — ED NOTES
Patient called multiple times, not in lounge, not in senior lounge, not in lobby bathroom, will call again in 5 minutes.

## 2022-01-20 NOTE — ED PROVIDER NOTES
ED Provider Note    Scribed for Yousuf Anguiano M.D. by Stephenie Thrasher. 1/20/2022  12:07 AM    Primary care provider: Kandis Mckeon P.A.-C.  Means of arrival: walk-in  History obtained from: patient  History limited by: none    CHIEF COMPLAINT  Chief Complaint   Patient presents with   • Skin Lesion   • Other       HPI  Herman Chiang is a 56 y.o. male who has a history of renal failure presents to the Emergency Department for evaluation of a moderate amount of dry skin on his lower extremities onset today. Herman was in the hospital earlier today and left AMA (prior to discharge). He says he was unable to shower prior to leaving and now his skin on his bilateral legs and buttocks is dry and bleeding. He notes since this morning his skin has been extremely dry and painful to touch. He has had the erythema along the inside of your legs. He notes the redness has been there for 6 months. He denies any fevers or chills. No alleviating or exacerbating factors were reported. Within the past few days he has had one dose of dialysis and taken antibiotics. He has a stent placed. He denies fistula in either of his arms. He has a medical history of basal cell carcinoma, hypertension, renal failure, and hyperlipidemia.     REVIEW OF SYSTEMS  Pertinent positives include: dry skin and pain secondary to cracked skin. Pertinent negatives include: fever or chills. See history of present illness.     PAST MEDICAL HISTORY   has a past medical history of MADDISON (acute kidney injury) ([facility]) (4/2/2016), Allergy, Anxiety, Cancer ([facility]), Cataract (2012), Chronic midline low back pain with bilateral sciatica, Dehydration (4/2/2016), Depression, GERD (gastroesophageal reflux disease), Hyperlipidemia, Hypertension, IBD (inflammatory bowel disease), Kidney disease, and Substance abuse ([facility]).    SURGICAL HISTORY   has a past surgical history that includes other surgical procedure (Bilateral) and eye surgery (Bilateral, 2012).    SOCIAL  HISTORY  Social History     Tobacco Use   • Smoking status: Current Every Day Smoker     Packs/day: 1.50     Years: 40.00     Pack years: 60.00     Types: Cigarettes   • Smokeless tobacco: Never Used   • Tobacco comment: Max was 4 PPD for 5 years   Vaping Use   • Vaping Use: Some days   • Start date: 2/9/2021   • Substances: THC   • Devices: Disposable   Substance Use Topics   • Alcohol use: Yes     Alcohol/week: 12.6 oz     Types: 21 Cans of beer per week     Comment: 3 a day   • Drug use: Yes     Types: Marijuana, Inhaled     Comment: thc vape      Social History     Substance and Sexual Activity   Drug Use Yes   • Types: Marijuana, Inhaled    Comment: thc vape       FAMILY HISTORY  Family History   Problem Relation Age of Onset   • Hypertension Mother    • Cancer Father         Nodular lymphoma   • Arthritis Brother    • Dementia Maternal Grandmother    • Stroke Maternal Grandmother    • Cancer Maternal Grandfather         lung   • Heart Attack Paternal Grandmother    • Heart Disease Paternal Grandmother    • Hypertension Paternal Grandmother    • Hyperlipidemia Paternal Grandmother    • No Known Problems Paternal Grandfather    • No Known Problems Brother    • Diabetes Neg Hx    • Kidney Disease Neg Hx        CURRENT MEDICATIONS  Home Medications     Reviewed by Swati Enrique R.N. (Registered Nurse) on 01/19/22 at 1755  Med List Status: <None>   Medication Last Dose Status   amLODIPine (NORVASC) 5 MG Tab  Active   amoxicillin-clavulanate (AUGMENTIN) 500-125 MG Tab  Active   buPROPion (WELLBUTRIN XL) 300 MG XL tablet  Active   Cholecalciferol (VITAMIN D3) 125 MCG (5000 UT) Cap  Active   dexamethasone (DECADRON) 6 MG Tab  Active   divalproex (DEPAKOTE) 250 MG Tablet Delayed Response  Active   gabapentin (NEURONTIN) 300 MG Cap  Active   lisinopril (PRINIVIL) 30 MG tablet  Active   pantoprazole (PROTONIX) 40 MG Tablet Delayed Response  Active   rosuvastatin (CRESTOR) 10 MG Tab  Active                 ALLERGIES  Allergies   Allergen Reactions   • Sulfamethoxazole Swelling   • Abilify      Flu like sx about 4 years ago   • Risperidone      Flu like sx 4 years ago   • Omeprazole Unspecified     Pt dislikes the way this drug makes him feel       PHYSICAL EXAM  VITAL SIGNS: /90   Pulse 98   Temp 36.8 °C (98.2 °F) (Temporal)   Resp (!) 22   Wt 83.6 kg (184 lb 4.9 oz)   SpO2 94%   BMI 30.67 kg/m²     Pulse ox interpretation: I interpret this pulse ox as normal.  Constitutional: Alert in no apparent distress.  HENT: Normocephalic, Atraumatic, Bilateral external ears normal. Nose normal.   Eyes: Pupils are equal and reactive. Conjunctiva normal, non-icteric.   Heart: Regular rate and rythm, no murmurs.    Lungs: Clear to auscultation bilaterally.  Skin: Warm, Dry, No erythema, No rash. Dry crackled skin on his bilateral lower extremities.   Neurologic: Alert, Grossly non-focal.   Psychiatric: Affect normal, Judgment normal, Mood normal, Appears appropriate and not intoxicated.     COURSE & MEDICAL DECISION MAKING  Nursing notes, VS, PMSFHx reviewed in chart.    56 y.o. male p/w chief complaint of dry itchy skin.    12:07 AM Patient seen and examined at bedside.  I informed the patient that we will provide him with normal lotion which he can apply himself. We dicussed speaking to social work to arrange a ride home. He denies needing any pain medication at this time. Discussed plans for discharge. Patient verbalizes understanding and agreement to this plan of care.      12:24 AM Upon reviewing the patient's chart, I found that Herman has a history of ichthyosis. Instructed the patient to followup with dermatologist and primary care providers. I recommended that he continues to use lotions and to shower. Patient verbalizes understanding and agreement to this plan of care.      I verified that the patient was wearing a mask and I was wearing appropriate PPE every time I entered the room. The patient's mask  was on the patient at all times during my encounter except for a brief view of the oropharynx.     The differential diagnoses include but are not limited to:   # Dry cracked skin on lower bilateral legs  Pt w/ hx of ichthyosis  No e/o cellulitis or streaking redness  Encouraged daily lotion and regular showering    The patient will return for new or worsening symptoms and is stable at the time of discharge.    The patient is referred to a primary physician for blood pressure management, diabetic screening, and for all other preventative health concerns.    DISPOSITION:  Patient will be discharged home in stable condition.    FOLLOW UP:  Kandis Mckeon P.A.-C.  [address]  [facility] [address] [phone]    In 3 days  For wound re-check    [facility], Emergency Dept  [address] [facility] [address] [phone]    If symptoms worsen      FINAL IMPRESSION  1. Dry skin    2. Ichthyosis          Stephenie BEST (Carolinae), am scribing for, and in the presence of, Yousuf Anguiano M.D..    Electronically signed by: Stephenie Thrasher ([place]), 1/20/2022    Yousuf BEST M.D. personally performed the services described in this documentation, as scribed by Stephenie Tharsher in my presence, and it is both accurate and complete.    The note accurately reflects work and decisions made by me.  Yousuf Anguiano M.D.  1/20/2022  6:23 AM

## 2022-01-20 NOTE — ED NOTES
Little from social work at bedside discussing POC with pt. It is reported that pt had all follow up care set up by [facility] staff but pt AMA'd. Pt will be discharged and will be given [facility]TM instructions for ride home.

## 2022-01-22 LAB
BACTERIA BLD CULT: NORMAL
BACTERIA BLD CULT: NORMAL
SIGNIFICANT IND 70042: NORMAL
SIGNIFICANT IND 70042: NORMAL
SITE SITE: NORMAL
SITE SITE: NORMAL
SOURCE SOURCE: NORMAL
SOURCE SOURCE: NORMAL

## 2022-02-09 ENCOUNTER — HOSPITAL ENCOUNTER (OUTPATIENT)
Dept: LAB | Facility: MEDICAL CENTER | Age: 57
End: 2022-02-09
Attending: PHYSICIAN ASSISTANT
Payer: MEDICAID

## 2022-02-09 DIAGNOSIS — E78.2 MIXED HYPERLIPIDEMIA: ICD-10-CM

## 2022-02-09 DIAGNOSIS — E55.9 VITAMIN D DEFICIENCY: ICD-10-CM

## 2022-02-09 DIAGNOSIS — Z12.5 SCREENING FOR PROSTATE CANCER: ICD-10-CM

## 2022-02-09 DIAGNOSIS — K52.9 COLITIS: ICD-10-CM

## 2022-02-09 DIAGNOSIS — K76.0 HEPATIC STEATOSIS: ICD-10-CM

## 2022-02-09 DIAGNOSIS — R79.89 ELEVATED TSH: ICD-10-CM

## 2022-02-09 LAB
25(OH)D3 SERPL-MCNC: 92 NG/ML (ref 30–100)
ALBUMIN SERPL BCP-MCNC: 4.2 G/DL (ref 3.2–4.9)
ALBUMIN/GLOB SERPL: 1.4 G/DL
ALP SERPL-CCNC: 70 U/L (ref 30–99)
ALT SERPL-CCNC: 10 U/L (ref 2–50)
ANION GAP SERPL CALC-SCNC: 14 MMOL/L (ref 7–16)
AST SERPL-CCNC: 19 U/L (ref 12–45)
BASOPHILS # BLD AUTO: 1 % (ref 0–1.8)
BASOPHILS # BLD: 0.07 K/UL (ref 0–0.12)
BILIRUB SERPL-MCNC: 0.2 MG/DL (ref 0.1–1.5)
BUN SERPL-MCNC: 10 MG/DL (ref 8–22)
CALCIUM SERPL-MCNC: 9.3 MG/DL (ref 8.5–10.5)
CHLORIDE SERPL-SCNC: 98 MMOL/L (ref 96–112)
CHOLEST SERPL-MCNC: 153 MG/DL (ref 100–199)
CO2 SERPL-SCNC: 20 MMOL/L (ref 20–33)
CREAT SERPL-MCNC: 0.96 MG/DL (ref 0.5–1.4)
EOSINOPHIL # BLD AUTO: 0.18 K/UL (ref 0–0.51)
EOSINOPHIL NFR BLD: 2.4 % (ref 0–6.9)
ERYTHROCYTE [DISTWIDTH] IN BLOOD BY AUTOMATED COUNT: 47.8 FL (ref 35.9–50)
FASTING STATUS PATIENT QL REPORTED: NORMAL
GLOBULIN SER CALC-MCNC: 2.9 G/DL (ref 1.9–3.5)
GLUCOSE SERPL-MCNC: 95 MG/DL (ref 65–99)
HCT VFR BLD AUTO: 36.6 % (ref 42–52)
HDLC SERPL-MCNC: 63 MG/DL
HGB BLD-MCNC: 12.8 G/DL (ref 14–18)
IMM GRANULOCYTES # BLD AUTO: 0.07 K/UL (ref 0–0.11)
IMM GRANULOCYTES NFR BLD AUTO: 1 % (ref 0–0.9)
LDLC SERPL CALC-MCNC: 78 MG/DL
LYMPHOCYTES # BLD AUTO: 1.94 K/UL (ref 1–4.8)
LYMPHOCYTES NFR BLD: 26.4 % (ref 22–41)
MCH RBC QN AUTO: 32.9 PG (ref 27–33)
MCHC RBC AUTO-ENTMCNC: 35 G/DL (ref 33.7–35.3)
MCV RBC AUTO: 94.1 FL (ref 81.4–97.8)
MONOCYTES # BLD AUTO: 0.52 K/UL (ref 0–0.85)
MONOCYTES NFR BLD AUTO: 7.1 % (ref 0–13.4)
NEUTROPHILS # BLD AUTO: 4.58 K/UL (ref 1.82–7.42)
NEUTROPHILS NFR BLD: 62.1 % (ref 44–72)
NRBC # BLD AUTO: 0 K/UL
NRBC BLD-RTO: 0 /100 WBC
PLATELET # BLD AUTO: 194 K/UL (ref 164–446)
PMV BLD AUTO: 11.3 FL (ref 9–12.9)
POTASSIUM SERPL-SCNC: 4.5 MMOL/L (ref 3.6–5.5)
PROT SERPL-MCNC: 7.1 G/DL (ref 6–8.2)
PSA SERPL-MCNC: 1.59 NG/ML (ref 0–4)
RBC # BLD AUTO: 3.89 M/UL (ref 4.7–6.1)
SODIUM SERPL-SCNC: 132 MMOL/L (ref 135–145)
T4 FREE SERPL-MCNC: 0.99 NG/DL (ref 0.93–1.7)
TRIGL SERPL-MCNC: 62 MG/DL (ref 0–149)
TSH SERPL DL<=0.005 MIU/L-ACNC: 2.83 UIU/ML (ref 0.38–5.33)
WBC # BLD AUTO: 7.4 K/UL (ref 4.8–10.8)

## 2022-02-09 PROCEDURE — 85025 COMPLETE CBC W/AUTO DIFF WBC: CPT

## 2022-02-09 PROCEDURE — 84443 ASSAY THYROID STIM HORMONE: CPT

## 2022-02-09 PROCEDURE — 80061 LIPID PANEL: CPT

## 2022-02-09 PROCEDURE — 84153 ASSAY OF PSA TOTAL: CPT

## 2022-02-09 PROCEDURE — 36415 COLL VENOUS BLD VENIPUNCTURE: CPT

## 2022-02-09 PROCEDURE — 84439 ASSAY OF FREE THYROXINE: CPT

## 2022-02-09 PROCEDURE — 82306 VITAMIN D 25 HYDROXY: CPT

## 2022-02-09 PROCEDURE — 80053 COMPREHEN METABOLIC PANEL: CPT

## 2022-02-18 ENCOUNTER — OFFICE VISIT (OUTPATIENT)
Dept: MEDICAL GROUP | Facility: CLINIC | Age: 57
End: 2022-02-18
Payer: MEDICAID

## 2022-02-18 VITALS
OXYGEN SATURATION: 96 % | TEMPERATURE: 97.9 F | BODY MASS INDEX: 31.42 KG/M2 | HEART RATE: 70 BPM | SYSTOLIC BLOOD PRESSURE: 120 MMHG | WEIGHT: 188.6 LBS | HEIGHT: 65 IN | DIASTOLIC BLOOD PRESSURE: 70 MMHG | RESPIRATION RATE: 16 BRPM

## 2022-02-18 DIAGNOSIS — C44.619 BASAL CELL CARCINOMA (BCC) OF SKIN OF LEFT UPPER EXTREMITY INCLUDING SHOULDER: ICD-10-CM

## 2022-02-18 DIAGNOSIS — N17.9 AKI (ACUTE KIDNEY INJURY) (HCC): ICD-10-CM

## 2022-02-18 DIAGNOSIS — U07.1 COVID-19 VIRUS INFECTION: ICD-10-CM

## 2022-02-18 PROCEDURE — 99214 OFFICE O/P EST MOD 30 MIN: CPT | Mod: CS | Performed by: PHYSICIAN ASSISTANT

## 2022-02-18 RX ORDER — INSULIN HUMAN 100 [IU]/ML
INJECTION, SOLUTION PARENTERAL
COMMUNITY
Start: 2022-01-15 | End: 2022-02-18

## 2022-02-18 RX ORDER — ONDANSETRON 2 MG/ML
INJECTION INTRAMUSCULAR; INTRAVENOUS
COMMUNITY
Start: 2022-01-15 | End: 2022-10-09

## 2022-02-18 RX ORDER — CALCIUM GLUCONATE 94 MG/ML
INJECTION, SOLUTION INTRAVENOUS
COMMUNITY
Start: 2022-01-15 | End: 2022-02-18

## 2022-02-18 RX ORDER — DEXTROSE MONOHYDRATE 25 G/50ML
INJECTION, SOLUTION INTRAVENOUS
COMMUNITY
Start: 2022-01-15 | End: 2022-02-18

## 2022-02-18 RX ORDER — SODIUM CHLORIDE 9 MG/ML
INJECTION, SOLUTION INTRAVENOUS
COMMUNITY
Start: 2022-01-15 | End: 2022-02-18

## 2022-02-18 RX ORDER — ACETAMINOPHEN 325 MG/1
TABLET ORAL
COMMUNITY
Start: 2022-01-15 | End: 2022-06-15

## 2022-02-18 ASSESSMENT — FIBROSIS 4 INDEX: FIB4 SCORE: 1.67

## 2022-02-18 ASSESSMENT — ENCOUNTER SYMPTOMS
CARDIOVASCULAR NEGATIVE: 1
FEVER: 0
NEUROLOGICAL NEGATIVE: 1
EYES NEGATIVE: 1
CHILLS: 0
WEIGHT LOSS: 0
SORE THROAT: 0
HEMOPTYSIS: 0
SPUTUM PRODUCTION: 0
COUGH: 1
MUSCULOSKELETAL NEGATIVE: 1
WHEEZING: 0
STRIDOR: 0
GASTROINTESTINAL NEGATIVE: 1
DIAPHORESIS: 0
SHORTNESS OF BREATH: 0
SINUS PAIN: 0
PSYCHIATRIC NEGATIVE: 1

## 2022-02-18 ASSESSMENT — VISUAL ACUITY: OU: 1

## 2022-02-19 NOTE — PROGRESS NOTES
Subjective   Herman Chiang is a 56 y.o. male who presents with Lab Results (Fv on lab results ), Follow-Up (COVID Follow up pt went in 1/19), and Nasal Congestion    1. MADDISON (acute kidney injury) (HCC)  Patient was admitted 1/15/2022 for acute kidney injury and renal failure secondary to Covid infection.  Patient states that he got sick with COVID and developed severe diarrhea which caused him to be dehydrated.  He went to [facility] where he was found to be in renal failure with GFR of 3.  He was admitted and had emergency dialysis on 1/16.  He was discharged from the hospital 1/19/2022 with a GFR of 52.  He was told to follow-up with his primary care provider for repeat lab testing.  On 2/9 he did labs ordered by the clinic and his GFR had returned to over 60.    2. COVID-19 virus infection  Patient hospitalized with Covid.  During his hospitalization he became hypoxic and required oxygen.  He was weaned down to 3 L and was to be sent home with home oxygen but patient refused.  Today he is still complaining of cough, congestion and fatigue.  We have discussed that the post Covid cough and fatigue have been lasting 6 to 8 weeks for most people.  He denies shortness of breath or difficulty breathing.  SPO2 in the office today is 96.    3. Basal cell carcinoma (BCC) of skin of left upper extremity including shoulder  He has appointment with dermatology on 3/4/2022 to have more skin cancer lesions removed.  We will request records from dermatology.    Past Medical History:  4/2/2016: MADDISON (acute kidney injury) (HCC)  No date: Allergy  No date: Anxiety  No date: Cancer (HCC)  2012: Cataract      Comment:  bilateral removal  No date: Chronic midline low back pain with bilateral sciatica  4/2/2016: Dehydration  No date: Depression  No date: GERD (gastroesophageal reflux disease)  No date: Hyperlipidemia  No date: Hypertension  No date: IBD (inflammatory bowel disease)      Comment:  diarrhea  No date: Kidney disease  No  date: Substance abuse ([facility])      Comment:  Crack - clean since 2004  Past Surgical History:  2012: EYE SURGERY; Bilateral      Comment:  cataracts  No date: OTHER SURGICAL PROCEDURE; Bilateral      Comment:  BCCA skin multiple cites  Social History    Tobacco Use      Smoking status: Current Every Day Smoker        Packs/day: 1.50        Years: 40.00        Pack years: 60        Types: Cigarettes      Smokeless tobacco: Never Used      Tobacco comment: Max was 4 PPD for 5 years    Vaping Use      Vaping Use: Some days        Start date: 2/9/2021        Devices: Disposable    Alcohol use: Yes      Alcohol/week: 12.6 oz      Types: 21 Cans of beer per week      Comment: 3 a day    Drug use: Yes      Types: Marijuana, Inhaled      Comment: thc vape    Review of patient's family history indicates:  Problem: Hypertension      Relation: Mother          Age of Onset: (Not Specified)  Problem: Cancer      Relation: Father          Age of Onset: (Not Specified)          Comment: Nodular lymphoma  Problem: Arthritis      Relation: Brother          Age of Onset: (Not Specified)  Problem: Dementia      Relation: Maternal Grandmother          Age of Onset: (Not Specified)  Problem: Stroke      Relation: Maternal Grandmother          Age of Onset: (Not Specified)  Problem: Cancer      Relation: Maternal Grandfather          Age of Onset: (Not Specified)          Comment: lung  Problem: Heart Attack      Relation: Paternal Grandmother          Age of Onset: (Not Specified)  Problem: Heart Disease      Relation: Paternal Grandmother          Age of Onset: (Not Specified)  Problem: Hypertension      Relation: Paternal Grandmother          Age of Onset: (Not Specified)  Problem: Hyperlipidemia      Relation: Paternal Grandmother          Age of Onset: (Not Specified)  Problem: No Known Problems      Relation: Paternal Grandfather          Age of Onset: (Not Specified)  Problem: No Known Problems      Relation: Brother          Age of  Onset: (Not Specified)  Problem: Diabetes      Relation: Neg Hx          Age of Onset: (Not Specified)  Problem: Kidney Disease      Relation: Neg Hx          Age of Onset: (Not Specified)      Current Outpatient Medications: •  acetaminophen (TYLENOL) 325 MG Tab, , Disp: , Rfl: •  ondansetron (ZOFRAN) 4 MG/2ML Solution injection, , Disp: , Rfl: •  lisinopril (PRINIVIL) 30 MG tablet, Take 1 Tablet by mouth every day. Indications: High Blood Pressure Disorder, Disp: 90 Tablet, Rfl: 3•  gabapentin (NEURONTIN) 300 MG Cap, Take 1 Capsule by mouth 3 times a day. Indications: Neuropathic Pain, Disp: 270 Capsule, Rfl: 3•  buPROPion (WELLBUTRIN XL) 300 MG XL tablet, Take 1 Tablet by mouth every day., Disp: 90 Tablet, Rfl: 3•  amLODIPine (NORVASC) 5 MG Tab, Take 1 Tablet by mouth every day. Indications: High Blood Pressure Disorder, Disp: 90 Tablet, Rfl: 3•  rosuvastatin (CRESTOR) 10 MG Tab, Take 1 Tablet by mouth every evening. Indications: Disease of the Heart or Blood Vessels, Disp: 90 Tablet, Rfl: 3•  Cholecalciferol (VITAMIN D3) 125 MCG (5000 UT) Cap, Take 1 Capsule by mouth every day., Disp: 90 Capsule, Rfl: 3•  pantoprazole (PROTONIX) 40 MG Tablet Delayed Response, Take 1 Tablet by mouth every day., Disp: 90 Tablet, Rfl: 3•  divalproex (DEPAKOTE) 250 MG Tablet Delayed Response, Take 2 Tablets by mouth 2 times a day., Disp: 360 Tablet, Rfl: 3    Patient was instructed on the use of medications, either prescriptions or OTC and informed on when the appropriate follow up time period should be. In addition, patient was also instructed that should any acute worsening occur that they should notify this clinic asap or call 911.      Review of Systems   Constitutional: Positive for malaise/fatigue. Negative for chills, diaphoresis, fever and weight loss.   HENT: Positive for congestion, ear pain, hearing loss and tinnitus. Negative for ear discharge, nosebleeds, sinus pain and sore throat.    Eyes: Negative.    Respiratory:  "Positive for cough. Negative for hemoptysis, sputum production, shortness of breath, wheezing and stridor.    Cardiovascular: Negative.    Gastrointestinal: Negative.    Genitourinary: Negative.    Musculoskeletal: Negative.    Skin: Negative.    Neurological: Negative.    Endo/Heme/Allergies: Negative.    Psychiatric/Behavioral: Negative.      Objective     /70 (BP Location: Left arm, Patient Position: Sitting, BP Cuff Size: Adult long)   Pulse 70   Temp 36.6 °C (97.9 °F) (Temporal)   Resp 16   Ht 1.651 m (5' 5\")   Wt 85.5 kg (188 lb 9.6 oz)   SpO2 96%   BMI 31.38 kg/m²      Physical Exam  Vitals and nursing note reviewed.   Constitutional:       Appearance: Normal appearance. He is well-developed and well-groomed.   HENT:      Head: Normocephalic and atraumatic.      Right Ear: Tympanic membrane, ear canal and external ear normal. There is no impacted cerumen.      Left Ear: Tympanic membrane, ear canal and external ear normal. There is no impacted cerumen.      Nose: Congestion and rhinorrhea present.      Mouth/Throat:      Lips: Pink. No lesions.      Mouth: Mucous membranes are moist.      Pharynx: Oropharynx is clear. No oropharyngeal exudate or posterior oropharyngeal erythema.   Eyes:      General: Lids are normal. Vision grossly intact. Gaze aligned appropriately.      Extraocular Movements: Extraocular movements intact.      Conjunctiva/sclera: Conjunctivae normal.      Pupils: Pupils are equal, round, and reactive to light.   Neck:      Thyroid: No thyromegaly.      Vascular: No carotid bruit or JVD.      Trachea: Trachea and phonation normal.   Cardiovascular:      Rate and Rhythm: Normal rate and regular rhythm.      Heart sounds: Normal heart sounds. No murmur heard.    No friction rub. No gallop.   Pulmonary:      Effort: Pulmonary effort is normal.      Breath sounds: Normal breath sounds. No wheezing, rhonchi or rales.   Musculoskeletal:         General: Normal range of motion.      " Cervical back: Normal range of motion and neck supple.      Right lower leg: No edema.      Left lower leg: No edema.   Lymphadenopathy:      Cervical: No cervical adenopathy.   Skin:     General: Skin is warm and dry.      Capillary Refill: Capillary refill takes less than 2 seconds.      Findings: No lesion or rash.   Neurological:      Mental Status: He is alert and oriented to person, place, and time.      Cranial Nerves: Cranial nerves are intact.   Psychiatric:         Attention and Perception: Attention and perception normal.         Mood and Affect: Mood and affect normal.         Speech: Speech normal.         Behavior: Behavior normal. Behavior is cooperative.         Thought Content: Thought content normal.         Judgment: Judgment normal.       Assessment & Plan      1. MADDISON (acute kidney injury) (HCC)    2. COVID-19 virus infection    3. Basal cell carcinoma (BCC) of skin of left upper extremity including shoulder

## 2022-06-15 ENCOUNTER — OFFICE VISIT (OUTPATIENT)
Dept: MEDICAL GROUP | Facility: CLINIC | Age: 57
End: 2022-06-15
Payer: MEDICAID

## 2022-06-15 VITALS
DIASTOLIC BLOOD PRESSURE: 74 MMHG | OXYGEN SATURATION: 97 % | RESPIRATION RATE: 20 BRPM | BODY MASS INDEX: 31.49 KG/M2 | WEIGHT: 189 LBS | SYSTOLIC BLOOD PRESSURE: 122 MMHG | TEMPERATURE: 97.7 F | HEART RATE: 94 BPM | HEIGHT: 65 IN

## 2022-06-15 DIAGNOSIS — E55.9 VITAMIN D DEFICIENCY: ICD-10-CM

## 2022-06-15 DIAGNOSIS — Z12.11 SCREENING FOR COLORECTAL CANCER: ICD-10-CM

## 2022-06-15 DIAGNOSIS — R10.32 LEFT LOWER QUADRANT ABDOMINAL PAIN: ICD-10-CM

## 2022-06-15 DIAGNOSIS — R10.31 CHRONIC BILATERAL LOWER ABDOMINAL PAIN: ICD-10-CM

## 2022-06-15 DIAGNOSIS — G89.29 CHRONIC BILATERAL LOWER ABDOMINAL PAIN: ICD-10-CM

## 2022-06-15 DIAGNOSIS — K76.0 HEPATIC STEATOSIS: ICD-10-CM

## 2022-06-15 DIAGNOSIS — K40.20 BILATERAL INGUINAL HERNIA WITHOUT OBSTRUCTION OR GANGRENE, RECURRENCE NOT SPECIFIED: ICD-10-CM

## 2022-06-15 DIAGNOSIS — R10.30 LOWER ABDOMINAL PAIN: ICD-10-CM

## 2022-06-15 DIAGNOSIS — Z12.12 SCREENING FOR COLORECTAL CANCER: ICD-10-CM

## 2022-06-15 DIAGNOSIS — R10.32 CHRONIC BILATERAL LOWER ABDOMINAL PAIN: ICD-10-CM

## 2022-06-15 DIAGNOSIS — K52.9 COLITIS: ICD-10-CM

## 2022-06-15 PROCEDURE — 99213 OFFICE O/P EST LOW 20 MIN: CPT | Performed by: PHYSICIAN ASSISTANT

## 2022-06-15 RX ORDER — CHOLECALCIFEROL (VITAMIN D3) 125 MCG
1 CAPSULE ORAL DAILY
Qty: 90 TABLET | Refills: 3 | Status: SHIPPED | OUTPATIENT
Start: 2022-06-15 | End: 2023-03-29

## 2022-06-15 ASSESSMENT — FIBROSIS 4 INDEX: FIB4 SCORE: 1.67

## 2022-06-15 NOTE — PROGRESS NOTES
Chief Complaint   Patient presents with   • Abdominal Pain     Across the lower abdominal, difficulties eating, nausea, SOB, x1 month.        HISTORY OF PRESENT ILLNESS: Patient is a 56 y.o. male established patient who presents today to discuss the following issues:    Lower abdominal pain  Patient has a history of chronic lower abdominal pain. Over the last month he has had a worsening of symptoms to the point where the pain is causing nausea and vomiting. He states that there has been no change in any day to day activities or diet. States that he cannot sleep due to the pain and that it is a different pain than he had with colitis. Will order CT of abdomen and pelvis with and without to evaluate further. He had an appointment with GI but his MTM  never showed up so he never made the appointment. After the CT we will see if we can find a GI doctor that can see him in [place] to further evaluate the problem.  He understands that he is to go to the ER if the pain becomes worse or if he develops new symptoms.      Patient Active Problem List    Diagnosis Date Noted   • Lower abdominal pain 06/15/2022   • Alcohol use 01/16/2022   • COVID-19 virus infection 01/15/2022   • Chronic midline low back pain with bilateral sciatica 10/25/2021   • Atherosclerosis of abdominal aorta (HCC) 10/25/2021   • Hepatic steatosis 10/25/2021   • Obesity (BMI 30-39.9) 10/14/2021   • Vitamin D deficiency 08/04/2021   • Mixed hyperlipidemia 08/04/2021   • Essential hypertension 08/04/2021   • Ichthyosis, X-linked 08/04/2021   • Basal cell carcinoma (BCC) of skin of left upper extremity including shoulder 08/04/2021   • Chronic bilateral lower abdominal pain 08/04/2021   • Colitis 02/02/2021   • Bilateral inguinal hernia 02/02/2021   • Diarrhea 04/02/2016   • MADDISON (acute kidney injury) ([facility]) 04/02/2016   • GERD (gastroesophageal reflux disease) 04/02/2016   • Bipolar affective ([facility]) 04/02/2016       Allergies:Sulfamethoxazole, Abilify,  Risperidone, and Omeprazole    Current Outpatient Medications   Medication Sig Dispense Refill   • Cholecalciferol (VITAMIN D3) 50 MCG ( UT) Tab Take 1 Tablet by mouth every day. 90 Tablet 3   • ondansetron (ZOFRAN) 4 MG/2ML Solution injection      • lisinopril (PRINIVIL) 30 MG tablet Take 1 Tablet by mouth every day. Indications: High Blood Pressure Disorder 90 Tablet 3   • gabapentin (NEURONTIN) 300 MG Cap Take 1 Capsule by mouth 3 times a day. Indications: Neuropathic Pain 270 Capsule 3   • buPROPion (WELLBUTRIN XL) 300 MG XL tablet Take 1 Tablet by mouth every day. 90 Tablet 3   • amLODIPine (NORVASC) 5 MG Tab Take 1 Tablet by mouth every day. Indications: High Blood Pressure Disorder 90 Tablet 3   • rosuvastatin (CRESTOR) 10 MG Tab Take 1 Tablet by mouth every evening. Indications: Disease of the Heart or Blood Vessels 90 Tablet 3   • pantoprazole (PROTONIX) 40 MG Tablet Delayed Response Take 1 Tablet by mouth every day. 90 Tablet 3   • divalproex (DEPAKOTE) 250 MG Tablet Delayed Response Take 2 Tablets by mouth 2 times a day. 360 Tablet 3     No current facility-administered medications for this visit.       Social History     Tobacco Use   • Smoking status: Current Every Day Smoker     Packs/day: 1.50     Years: 40.00     Pack years: 60.00     Types: Cigarettes   • Smokeless tobacco: Never Used   • Tobacco comment: Max was 4 PPD for 5 years   Vaping Use   • Vaping Use: Some days   • Start date: 2021   • Substances: THC   • Devices: Disposable   Substance Use Topics   • Alcohol use: Yes     Alcohol/week: 12.6 oz     Types: 21 Cans of beer per week     Comment: 3 a day   • Drug use: Yes     Types: Marijuana, Inhaled     Comment: thc vape       Family Status   Relation Name Status   • Mo  Alive   • Fa     • Bro  Alive   • MGMo     • MGFa     • PGMo     • PGFa          Car accident    • Bro  Alive   • Neg Hx  (Not Specified)     Family History   Problem Relation  "Age of Onset   • Hypertension Mother    • Cancer Father         Nodular lymphoma   • Arthritis Brother    • Dementia Maternal Grandmother    • Stroke Maternal Grandmother    • Cancer Maternal Grandfather         lung   • Heart Attack Paternal Grandmother    • Heart Disease Paternal Grandmother    • Hypertension Paternal Grandmother    • Hyperlipidemia Paternal Grandmother    • No Known Problems Paternal Grandfather    • No Known Problems Brother    • Diabetes Neg Hx    • Kidney Disease Neg Hx        Review of Systems:   Constitutional: Negative for fever, chills, weight loss and malaise/fatigue.   HENT: Negative for ear pain, nosebleeds, congestion, sore throat and neck pain.    Eyes: Negative for blurred vision.   Respiratory: Negative for cough, sputum production, shortness of breath and wheezing.    Cardiovascular: Negative for chest pain, palpitations, orthopnea and leg swelling.   Gastrointestinal: Negative for heartburn. Positive for nausea, vomiting and abdominal pain.   Genitourinary: Negative for dysuria, urgency and frequency.   Musculoskeletal: Negative for myalgias, back pain and joint pain.   Skin: Negative for rash and itching.   Neurological: Negative for dizziness, tingling, tremors, sensory change, focal weakness and headaches.   Endo/Heme/Allergies: Does not bruise/bleed easily.   Psychiatric/Behavioral: Negative for depression, suicidal ideas and memory loss.  The patient is not nervous/anxious and does not have insomnia.    All other systems reviewed and are negative except as in HPI.    Wt Readings from Last 3 Encounters:   06/15/22 85.7 kg (189 lb)   02/18/22 85.5 kg (188 lb 9.6 oz)   01/19/22 83.6 kg (184 lb 4.9 oz)   ]    Exam:  /74 (BP Location: Left arm, Patient Position: Sitting, BP Cuff Size: Large adult)   Pulse 94   Temp 36.5 °C (97.7 °F) (Temporal)   Resp 20   Ht 1.651 m (5' 5\")   Wt 85.7 kg (189 lb)   SpO2 97%  Body mass index is 31.45 kg/m².   General:  Well nourished, " obese, well developed male. No apparent distress. Not ill appearing.  Eyes: EOM intact, PERRL, conjunctiva non-injected, sclera non-icteric.  Neck: Supple with no cervical lymphadenopathy, JVD, palpable thyroid nodules or carotid bruits.  Pulmonary: Clear to ausculation bilaterally. Normal effort. No rales, ronchi, or wheezing.  Cardiovascular: Regular rate and rhythm without murmur, rub or gallop.   Abdomen:  Soft, non-distended. Tender to palpation in bilateral lower quadrant. Positive for guarding without rebound. with normal bowel sounds. No CVA tenderness  Extremities: Full range of motion. Warm and well perfused with no edema.  Skin: Intact with no obvious rashes or lesions.  Neuro: Cranial nerves I-XII grossly intact.  Psych: Alert and oriented x 3.  Appropriately dressed. Mood and affect appropriate.    Assessment/Plan:  1. Vitamin D deficiency  Cholecalciferol (VITAMIN D3) 50 MCG (2000 UT) Tab   2. Chronic bilateral lower abdominal pain  CT-ABDOMEN-PELVIS WITH & W/O    Referral to Gastroenterology    CANCELED: CT-ABDOMEN-PELVIS WITH & W/O   3. Hepatic steatosis  CT-ABDOMEN-PELVIS WITH & W/O    Referral to Gastroenterology    CANCELED: CT-ABDOMEN-PELVIS WITH & W/O   4. Bilateral inguinal hernia without obstruction or gangrene, recurrence not specified  CT-ABDOMEN-PELVIS WITH & W/O    CANCELED: CT-ABDOMEN-PELVIS WITH & W/O   5. Colitis  CT-ABDOMEN-PELVIS WITH & W/O    Referral to Gastroenterology    CANCELED: CT-ABDOMEN-PELVIS WITH & W/O   6. Left lower quadrant abdominal pain  Referral to Gastroenterology   7. Lower abdominal pain  CT-ABDOMEN-PELVIS WITH & W/O    Referral to Gastroenterology    CANCELED: CT-ABDOMEN-PELVIS WITH & W/O   8. Screening for colorectal cancer  Referral to Gastroenterology       Reviewed risks and benefits of treatment plan. Patient verbally agrees to plan of care.     Return if symptoms worsen or fail to improve, for follow up after CT.    Please note that this dictation was  created using voice recognition software. I have made every reasonable attempt to correct obvious errors, but I expect that there are errors of grammar and possibly content that I did not discover before finalizing the note.

## 2022-06-15 NOTE — ASSESSMENT & PLAN NOTE
Patient has a history of chronic lower abdominal pain. Over the last month he has had a worsening of symptoms to the point where the pain is causing nausea and vomiting. He states that there has been no change in any day to day activities or diet. States that he cannot sleep due to the pain and that it is a different pain than he had with colitis. Will order CT of abdomen and pelvis with and without to evaluate further. He had an appointment with GI but his MTM  never showed up so he never made the appointment. After the CT we will see if we can find a GI doctor that can see him in [place] to further evaluate the problem.  He understands that he is to go to the ER if the pain becomes worse or if he develops new symptoms.

## 2022-06-24 ENCOUNTER — TELEPHONE (OUTPATIENT)
Dept: MEDICAL GROUP | Facility: CLINIC | Age: 57
End: 2022-06-24
Payer: MEDICAID

## 2022-06-25 NOTE — TELEPHONE ENCOUNTER
VOICEMAIL  1. Caller Name: Merline                      Call Back Number: [phone]    2. Message: received a message from  stating that pt was there trying to schedule CT. They are saying that because of the contrast shortage they are requesting a new order put in that only says w/o contrast. Once the new order is in and we send it to [facility] they can get him scheduled to get it done     3. Patient approves office to leave a detailed voicemail/MyChart message: N\A

## 2022-06-26 DIAGNOSIS — K40.20 BILATERAL INGUINAL HERNIA WITHOUT OBSTRUCTION OR GANGRENE, RECURRENCE NOT SPECIFIED: ICD-10-CM

## 2022-06-26 DIAGNOSIS — R10.30 LOWER ABDOMINAL PAIN: ICD-10-CM

## 2022-06-26 DIAGNOSIS — K52.9 COLITIS: ICD-10-CM

## 2022-06-26 DIAGNOSIS — R10.32 CHRONIC BILATERAL LOWER ABDOMINAL PAIN: ICD-10-CM

## 2022-06-26 DIAGNOSIS — R10.31 CHRONIC BILATERAL LOWER ABDOMINAL PAIN: ICD-10-CM

## 2022-06-26 DIAGNOSIS — R10.32 LEFT LOWER QUADRANT ABDOMINAL PAIN: ICD-10-CM

## 2022-06-26 DIAGNOSIS — K76.0 HEPATIC STEATOSIS: ICD-10-CM

## 2022-06-26 DIAGNOSIS — G89.29 CHRONIC BILATERAL LOWER ABDOMINAL PAIN: ICD-10-CM

## 2022-06-30 ENCOUNTER — TELEPHONE (OUTPATIENT)
Dept: MEDICAL GROUP | Facility: CLINIC | Age: 57
End: 2022-06-30
Payer: MEDICAID

## 2022-06-30 NOTE — TELEPHONE ENCOUNTER
VOICEMAIL  1. Caller Name: Herman Chiang                      Call Back Number: [phone]    2. Message: Pt is requesting orders be sent to [facility].     3. Patient approves office to leave a detailed voicemail/MyChart message: N\A

## 2022-08-19 ENCOUNTER — OFFICE VISIT (OUTPATIENT)
Dept: MEDICAL GROUP | Facility: CLINIC | Age: 57
End: 2022-08-19
Payer: MEDICAID

## 2022-08-19 VITALS
WEIGHT: 184.6 LBS | SYSTOLIC BLOOD PRESSURE: 110 MMHG | BODY MASS INDEX: 30.75 KG/M2 | HEIGHT: 65 IN | OXYGEN SATURATION: 97 % | HEART RATE: 100 BPM | TEMPERATURE: 97.4 F | DIASTOLIC BLOOD PRESSURE: 70 MMHG | RESPIRATION RATE: 20 BRPM

## 2022-08-19 DIAGNOSIS — G89.29 CHRONIC BILATERAL LOWER ABDOMINAL PAIN: ICD-10-CM

## 2022-08-19 DIAGNOSIS — Z12.11 SCREENING FOR COLORECTAL CANCER: ICD-10-CM

## 2022-08-19 DIAGNOSIS — R10.32 CHRONIC BILATERAL LOWER ABDOMINAL PAIN: ICD-10-CM

## 2022-08-19 DIAGNOSIS — R10.31 CHRONIC BILATERAL LOWER ABDOMINAL PAIN: ICD-10-CM

## 2022-08-19 DIAGNOSIS — Z12.12 SCREENING FOR COLORECTAL CANCER: ICD-10-CM

## 2022-08-19 DIAGNOSIS — E66.9 OBESITY (BMI 30-39.9): ICD-10-CM

## 2022-08-19 PROCEDURE — 99214 OFFICE O/P EST MOD 30 MIN: CPT | Performed by: PHYSICIAN ASSISTANT

## 2022-08-19 RX ORDER — CALCIUM CARBONATE 500 MG/1
500-1500 TABLET, CHEWABLE ORAL DAILY
COMMUNITY

## 2022-08-19 ASSESSMENT — FIBROSIS 4 INDEX: FIB4 SCORE: 1.67

## 2022-08-19 NOTE — PROGRESS NOTES
Chief Complaint   Patient presents with    Results     Ct abdomen        HISTORY OF PRESENT ILLNESS: Patient is a 56 y.o. male established patient who presents today to discuss the following issues:    Chronic bilateral lower abdominal pain  This pain has been occurring off and on for the last 2 years. He has been in excruciating pain and been seen in the hospital for this. He had an abdominal and pelvis CT looking for abnormalities and it is within normal limits and shows nothing to explain the pain. Patient was started on pantoprazole and has been taking Tums and he states that this made the abdominal pain completely resolve. He is to continue taking these medications until he is seen and evaluated by gastroenterology.  Controlled    Obesity (BMI 30-39.9)  Body mass index is 30.44 kg/m². Patient has been diagnosed with obesity and again has been counseled on caloric and carbohydrate restriction along with increasing exercise to 30 minutes 5 or more times a week.      Patient Active Problem List    Diagnosis Date Noted    Lower abdominal pain 06/15/2022    Alcohol use 01/16/2022    COVID-19 virus infection 01/15/2022    Chronic midline low back pain with bilateral sciatica 10/25/2021    Atherosclerosis of abdominal aorta (HCC) 10/25/2021    Hepatic steatosis 10/25/2021    Obesity (BMI 30-39.9) 10/14/2021    Vitamin D deficiency 08/04/2021    Mixed hyperlipidemia 08/04/2021    Essential hypertension 08/04/2021    Ichthyosis, X-linked 08/04/2021    Basal cell carcinoma (BCC) of skin of left upper extremity including shoulder 08/04/2021    Chronic bilateral lower abdominal pain 08/04/2021    Colitis 02/02/2021    Bilateral inguinal hernia 02/02/2021    Diarrhea 04/02/2016    MADDISON (acute kidney injury) (HCC) 04/02/2016    GERD (gastroesophageal reflux disease) 04/02/2016    Bipolar affective (HCC) 04/02/2016       Allergies:Sulfamethoxazole, Abilify, Risperidone, and Omeprazole    Current Outpatient Medications    Medication Sig Dispense Refill    calcium carbonate (TUMS) 500 MG Chew Tab Chew 500-1,500 mg every day.      Cholecalciferol (VITAMIN D3) 50 MCG (2000 UT) Tab Take 1 Tablet by mouth every day. 90 Tablet 3    lisinopril (PRINIVIL) 30 MG tablet Take 1 Tablet by mouth every day. Indications: High Blood Pressure Disorder 90 Tablet 3    gabapentin (NEURONTIN) 300 MG Cap Take 1 Capsule by mouth 3 times a day. Indications: Neuropathic Pain 270 Capsule 3    buPROPion (WELLBUTRIN XL) 300 MG XL tablet Take 1 Tablet by mouth every day. 90 Tablet 3    amLODIPine (NORVASC) 5 MG Tab Take 1 Tablet by mouth every day. Indications: High Blood Pressure Disorder 90 Tablet 3    rosuvastatin (CRESTOR) 10 MG Tab Take 1 Tablet by mouth every evening. Indications: Disease of the Heart or Blood Vessels 90 Tablet 3    pantoprazole (PROTONIX) 40 MG Tablet Delayed Response Take 1 Tablet by mouth every day. 90 Tablet 3    divalproex (DEPAKOTE) 250 MG Tablet Delayed Response Take 2 Tablets by mouth 2 times a day. 360 Tablet 3     No current facility-administered medications for this visit.       Social History     Tobacco Use    Smoking status: Every Day     Packs/day: 0.50     Years: 40.00     Pack years: 20.00     Types: Cigarettes    Smokeless tobacco: Never    Tobacco comments:     Max was 4 PPD for 5 years   Vaping Use    Vaping Use: Some days    Start date: 2021    Substances: THC    Devices: Disposable   Substance Use Topics    Alcohol use: Yes     Alcohol/week: 8.4 oz     Types: 14 Cans of beer per week     Comment: 2 day    Drug use: Yes     Types: Marijuana, Inhaled     Comment: thc vape       Family Status   Relation Name Status    Mo  Alive    Fa      Bro  Alive    MGMo      MGFa      PGMo      PGFa          Car accident 89    Bro  Alive    Neg Hx  (Not Specified)     Family History   Problem Relation Age of Onset    Hypertension Mother     Cancer Father         Nodular lymphoma     "Arthritis Brother     Dementia Maternal Grandmother     Stroke Maternal Grandmother     Cancer Maternal Grandfather         lung    Heart Attack Paternal Grandmother     Heart Disease Paternal Grandmother     Hypertension Paternal Grandmother     Hyperlipidemia Paternal Grandmother     No Known Problems Paternal Grandfather     No Known Problems Brother     Diabetes Neg Hx     Kidney Disease Neg Hx        Review of Systems:   Constitutional: Negative for fever, chills, weight loss and malaise/fatigue.   HENT: Negative for ear pain, nosebleeds, congestion, sore throat and neck pain.    Eyes: Negative for blurred vision.   Respiratory: Negative for cough, sputum production, shortness of breath and wheezing.    Cardiovascular: Negative for chest pain, palpitations, orthopnea and leg swelling.   Gastrointestinal: Negative for heartburn, nausea, vomiting and abdominal pain.   Genitourinary: Negative for dysuria, urgency and frequency.   Musculoskeletal: Negative for myalgias, back pain and joint pain.   Skin: Negative for rash and itching.   Neurological: Negative for dizziness, tingling, tremors, sensory change, focal weakness and headaches.   Endo/Heme/Allergies: Does not bruise/bleed easily.   Psychiatric/Behavioral: Negative for depression, suicidal ideas and memory loss.  The patient is not nervous/anxious and does not have insomnia.    All other systems reviewed and are negative except as in HPI.    Wt Readings from Last 3 Encounters:   08/19/22 83.7 kg (184 lb 9.6 oz)   06/15/22 85.7 kg (189 lb)   02/18/22 85.5 kg (188 lb 9.6 oz)   ]    Exam:  /70 (BP Location: Right arm, Patient Position: Sitting, BP Cuff Size: Adult)   Pulse 100   Temp 36.3 °C (97.4 °F) (Temporal)   Resp 20   Ht 1.659 m (5' 5.3\")   Wt 83.7 kg (184 lb 9.6 oz)   SpO2 97%  Body mass index is 30.44 kg/m².   General:  Well nourished, well developed male. No apparent distress. Not ill appearing.  Eyes: EOM intact, PERRL, conjunctiva " non-injected, sclera non-icteric.  Neck: Supple with no cervical lymphadenopathy, JVD, palpable thyroid nodules or carotid bruits.  Pulmonary: Clear to ausculation bilaterally. Normal effort. No rales, ronchi, or wheezing.  Cardiovascular: Regular rate and rhythm without murmur, rub or gallop.   Abdomen:  Soft, non-distended, non-tender with normal bowel sounds. No CVA tenderness  Extremities: Full range of motion. Warm and well perfused with no edema.  Skin: Intact with no obvious rashes or lesions.  Neuro: Cranial nerves I-XII grossly intact.  Psych: Alert and oriented x 3.  Appropriately dressed. Mood and affect appropriate.    Assessment/Plan:  1. Screening for colorectal cancer  COLOGUARD ([facility])      2. Obesity (BMI 30-39.9)  Patient identified as having weight management issue.  Appropriate orders and counseling given.      3. Chronic bilateral lower abdominal pain            Reviewed risks and benefits of treatment plan. Patient verbally agrees to plan of care.     Return in about 6 months (around 2/19/2023) for f/u GI.    Please note that this dictation was created using voice recognition software. I have made every reasonable attempt to correct obvious errors, but I expect that there are errors of grammar and possibly content that I did not discover before finalizing the note.

## 2022-10-09 NOTE — ASSESSMENT & PLAN NOTE
This pain has been occurring off and on for the last 2 years. He has been in excruciating pain and been seen in the hospital for this. He had an abdominal and pelvis CT looking for abnormalities and it is within normal limits and shows nothing to explain the pain. Patient was started on pantoprazole and has been taking Tums and he states that this made the abdominal pain completely resolve. He is to continue taking these medications until he is seen and evaluated by gastroenterology.  Controlled

## 2022-10-09 NOTE — ASSESSMENT & PLAN NOTE
Body mass index is 30.44 kg/m². Patient has been diagnosed with obesity and again has been counseled on caloric and carbohydrate restriction along with increasing exercise to 30 minutes 5 or more times a week.

## 2022-10-19 DIAGNOSIS — K21.9 GASTROESOPHAGEAL REFLUX DISEASE WITHOUT ESOPHAGITIS: ICD-10-CM

## 2022-10-19 RX ORDER — PANTOPRAZOLE SODIUM 40 MG/1
40 TABLET, DELAYED RELEASE ORAL
Qty: 90 TABLET | Refills: 0 | Status: SHIPPED | OUTPATIENT
Start: 2022-10-19 | End: 2023-01-27

## 2022-10-19 NOTE — TELEPHONE ENCOUNTER
Received request via: Pharmacy    Was the patient seen in the last year in this department? Yes    Does the patient have an active prescription (recently filled or refills available) for medication(s) requested? No    Last OV: 8/19/22  Last labs: 2/19/22

## 2022-11-01 DIAGNOSIS — G89.29 CHRONIC MIDLINE LOW BACK PAIN WITH BILATERAL SCIATICA: ICD-10-CM

## 2022-11-01 DIAGNOSIS — I10 ESSENTIAL HYPERTENSION: ICD-10-CM

## 2022-11-01 DIAGNOSIS — M54.42 CHRONIC MIDLINE LOW BACK PAIN WITH BILATERAL SCIATICA: ICD-10-CM

## 2022-11-01 DIAGNOSIS — M54.41 CHRONIC MIDLINE LOW BACK PAIN WITH BILATERAL SCIATICA: ICD-10-CM

## 2022-11-01 RX ORDER — LISINOPRIL 30 MG/1
30 TABLET ORAL DAILY
Qty: 90 TABLET | Refills: 3 | Status: SHIPPED | OUTPATIENT
Start: 2022-11-01 | End: 2023-08-04

## 2022-11-01 RX ORDER — AMLODIPINE BESYLATE 5 MG/1
5 TABLET ORAL DAILY
Qty: 90 TABLET | Refills: 3 | Status: SHIPPED | OUTPATIENT
Start: 2022-11-01 | End: 2023-08-21 | Stop reason: SDUPTHER

## 2022-11-01 RX ORDER — GABAPENTIN 300 MG/1
300 CAPSULE ORAL 3 TIMES DAILY
Qty: 270 CAPSULE | Refills: 3 | Status: SHIPPED | OUTPATIENT
Start: 2022-11-01 | End: 2023-08-04

## 2022-11-01 NOTE — TELEPHONE ENCOUNTER
Was the patient seen in the last year in this department? Yes    Does patient have an active prescription for medications requested? Yes    Received Request Via: Pharmacy    Hospital Outpatient Visit on 02/09/2022   Component Date Value    Prostatic Specific Antig* 02/09/2022 1.59     25-Hydroxy   Vitamin D 25 02/09/2022 92     Cholesterol,Tot 02/09/2022 153     Triglycerides 02/09/2022 62     HDL 02/09/2022 63     LDL 02/09/2022 78     TSH 02/09/2022 2.830     Free T-4 02/09/2022 0.99     Sodium 02/09/2022 132 (A)     Potassium 02/09/2022 4.5     Chloride 02/09/2022 98     Co2 02/09/2022 20     Anion Gap 02/09/2022 14.0     Glucose 02/09/2022 95     Bun 02/09/2022 10     Creatinine 02/09/2022 0.96     Calcium 02/09/2022 9.3     AST(SGOT) 02/09/2022 19     ALT(SGPT) 02/09/2022 10     Alkaline Phosphatase 02/09/2022 70     Total Bilirubin 02/09/2022 0.2     Albumin 02/09/2022 4.2     Total Protein 02/09/2022 7.1     Globulin 02/09/2022 2.9     A-G Ratio 02/09/2022 1.4     WBC 02/09/2022 7.4     RBC 02/09/2022 3.89 (A)     Hemoglobin 02/09/2022 12.8 (A)     Hematocrit 02/09/2022 36.6 (A)     MCV 02/09/2022 94.1     MCH 02/09/2022 32.9     MCHC 02/09/2022 35.0     RDW 02/09/2022 47.8     Platelet Count 02/09/2022 194     MPV 02/09/2022 11.3     Neutrophils-Polys 02/09/2022 62.10     Lymphocytes 02/09/2022 26.40     Monocytes 02/09/2022 7.10     Eosinophils 02/09/2022 2.40     Basophils 02/09/2022 1.00     Immature Granulocytes 02/09/2022 1.00 (A)     Nucleated RBC 02/09/2022 0.00     Neutrophils (Absolute) 02/09/2022 4.58     Lymphs (Absolute) 02/09/2022 1.94     Monos (Absolute) 02/09/2022 0.52     Eos (Absolute) 02/09/2022 0.18     Baso (Absolute) 02/09/2022 0.07     Immature Granulocytes (a* 02/09/2022 0.07     NRBC (Absolute) 02/09/2022 0.00     Fasting Status 02/09/2022 Fasting     GFR If African American 02/09/2022 >60     GFR If Non  Ameri* 02/09/2022 >60    Admission on 01/15/2022, Discharged on  01/19/2022   Component Date Value    WBC 01/16/2022 6.6     RBC 01/16/2022 4.28 (A)     Hemoglobin 01/16/2022 13.9 (A)     Hematocrit 01/16/2022 40.7 (A)     MCV 01/16/2022 95.1     MCH 01/16/2022 32.5     MCHC 01/16/2022 34.2     RDW 01/16/2022 51.5 (A)     Platelet Count 01/16/2022 135 (A)     MPV 01/16/2022 11.5     Neutrophils-Polys 01/16/2022 79.60 (A)     Lymphocytes 01/16/2022 10.10 (A)     Monocytes 01/16/2022 6.60     Eosinophils 01/16/2022 0.20     Basophils 01/16/2022 0.30     Immature Granulocytes 01/16/2022 3.20 (A)     Nucleated RBC 01/16/2022 0.00     Neutrophils (Absolute) 01/16/2022 5.28     Lymphs (Absolute) 01/16/2022 0.67 (A)     Monos (Absolute) 01/16/2022 0.44     Eos (Absolute) 01/16/2022 0.01     Baso (Absolute) 01/16/2022 0.02     Immature Granulocytes (a* 01/16/2022 0.21 (A)     NRBC (Absolute) 01/16/2022 0.00     Sodium 01/16/2022 138     Potassium 01/16/2022 6.4 (A)     Chloride 01/16/2022 103     Co2 01/16/2022 9 (A)     Anion Gap 01/16/2022 26.0 (A)     Glucose 01/16/2022 87     Bun 01/16/2022 106 (A)     Creatinine 01/16/2022 15.74 (A)     Calcium 01/16/2022 6.8 (A)     AST(SGOT) 01/16/2022 14     ALT(SGPT) 01/16/2022 10     Alkaline Phosphatase 01/16/2022 58     Total Bilirubin 01/16/2022 <0.2     Albumin 01/16/2022 3.2     Total Protein 01/16/2022 5.7 (A)     Globulin 01/16/2022 2.5     A-G Ratio 01/16/2022 1.3     D-Dimer Screen 01/16/2022 1.06 (A)     Stat C-Reactive Protein 01/16/2022 11.43 (A)     Procalcitonin 01/16/2022 0.49 (A)     WBC 01/16/2022 6.5     RBC 01/16/2022 4.20 (A)     Hemoglobin 01/16/2022 13.5 (A)     Hematocrit 01/16/2022 39.7 (A)     MCV 01/16/2022 94.5     MCH 01/16/2022 32.1     MCHC 01/16/2022 34.0     RDW 01/16/2022 51.1 (A)     Platelet Count 01/16/2022 130 (A)     MPV 01/16/2022 11.2     GFR If  01/16/2022 4 (A)     GFR If Non  Ameri* 01/16/2022 3 (A)     GFR If  01/16/2022 4 (A)     GFR If Non  Ameri*  01/16/2022 3 (A)     Color 01/16/2022 Yellow     Character 01/16/2022 Clear     Specific Gravity 01/16/2022 1.015     Ph 01/16/2022 6.0     Glucose 01/16/2022 Negative     Ketones 01/16/2022 Negative     Protein 01/16/2022 30 (A)     Bilirubin 01/16/2022 Negative     Nitrite 01/16/2022 Negative     Leukocyte Esterase 01/16/2022 Negative     Occult Blood 01/16/2022 Small (A)     Micro Urine Req 01/16/2022 Microscopic     Creatinine, Urine 01/16/2022 152.04     Microalbumin, Urine Rand* 01/16/2022 6.2     Micro Alb Creat Ratio 01/16/2022 41 (A)     Hepatitis B Surface Anti* 01/16/2022 Non-Reactive     Hepatitis B Cors Ab,IgM 01/16/2022 Non-Reactive     Hepatitis A Virus Ab, IgM 01/16/2022 Non-Reactive     Hepatitis C Antibody 01/16/2022 Non-Reactive     Hep B Surface Antibody Q* 01/16/2022 <3.50     Total Protein, Urine 01/16/2022 56.0 (A)     Creatinine, Random Urine 01/16/2022 151.58     Protein Creatinine Ratio 01/16/2022 369 (A)     Sodium, Urine -per volume 01/16/2022 83     WBC 01/16/2022 5-10 (A)     RBC 01/16/2022 2-5 (A)     Bacteria 01/16/2022 Few (A)     Epithelial Cells 01/16/2022 Rare     Mucous Threads 01/16/2022 Few     Urine Crystals 01/16/2022 Few Amorphous     Hyaline Cast 01/16/2022 3-5 (A)     Hepatitis B Core Ab, Tot* 01/16/2022 NonReactive     PT 01/17/2022 14.3     INR 01/17/2022 1.20 (A)     Antinuclear Antibody 01/17/2022 None Detected     C3 Complement 01/17/2022 89.7     Complement C4 01/17/2022 30.9     Anti-Dna -Ds 01/17/2022 3     GBM Ab, IgG IFA 01/17/2022 Negative     Myeloperoxidase Ab 01/17/2022 0     Serine Proteinase 3, IgG 01/17/2022 1     Sodium 01/17/2022 140     Potassium 01/17/2022 3.6     Chloride 01/17/2022 104     Co2 01/17/2022 22     Glucose 01/17/2022 94     Creatinine 01/17/2022 4.06 (A)     Bun 01/17/2022 57 (A)     Calcium 01/17/2022 7.0 (A)     Phosphorus 01/17/2022 4.9 (A)     Albumin 01/17/2022 2.9 (A)     GFR If  01/17/2022 19 (A)     GFR If Non   Ameri* 01/17/2022 15 (A)     Occult Blood Feces 01/18/2022 Negative     Hemoglobin 01/17/2022 12.1 (A)     Hematocrit 01/17/2022 34.3 (A)     PT 01/17/2022 14.9 (A)     INR 01/17/2022 1.27 (A)     Significant Indicator 01/17/2022 NEG     Source 01/17/2022 BLD     Site 01/17/2022 PERIPHERAL     Culture Result 01/17/2022                      Value:No growth after 5 days of incubation.  Blood culture testing and Gram stain, if indicated, are  performed at [facility], [address] [place] [address].  Positive blood cultures are  sent to [facility], [address] [place] [address], for organism identification and  susceptibility testing.      Significant Indicator 01/17/2022 NEG     Source 01/17/2022 BLD     Site 01/17/2022 PERIPHERAL     Culture Result 01/17/2022                      Value:No growth after 5 days of incubation.  Blood culture testing and Gram stain, if indicated, are  performed at [facility], [address] [place] [address].  Positive blood cultures are  sent to [facility], [address] [place] [address], for organism identification and  susceptibility testing.      Procalcitonin 01/17/2022 0.21     Lactic Acid 01/17/2022 1.5     Sodium 01/18/2022 141     Potassium 01/18/2022 4.2     Chloride 01/18/2022 108     Co2 01/18/2022 21     Glucose 01/18/2022 131 (A)     Creatinine 01/18/2022 1.77 (A)     Bun 01/18/2022 39 (A)     Calcium 01/18/2022 7.3 (A)     Phosphorus 01/18/2022 2.1 (A)     Albumin 01/18/2022 2.8 (A)     WBC 01/18/2022 4.0 (A)     RBC 01/18/2022 3.47 (A)     Hemoglobin 01/18/2022 11.1 (A)     Hematocrit 01/18/2022 32.1 (A)     MCV 01/18/2022 92.5     MCH 01/18/2022 32.0     MCHC 01/18/2022 34.6     RDW 01/18/2022 48.6     Platelet Count 01/18/2022 148 (A)     MPV 01/18/2022 10.7     GFR If  01/18/2022 49 (A)     GFR If Non  Ameri* 01/18/2022 40 (A)     Sodium  01/19/2022 145     Potassium 01/19/2022 4.1     Chloride 01/19/2022 108     Co2 01/19/2022 24     Glucose 01/19/2022 121 (A)     Creatinine 01/19/2022 1.43 (A)     Bun 01/19/2022 29 (A)     Calcium 01/19/2022 7.8 (A)     Phosphorus 01/19/2022 1.9 (A)     Albumin 01/19/2022 2.9 (A)     Anion Gap 01/19/2022 13.0     GFR If  01/19/2022 >60     GFR If Non  Ameri* 01/19/2022 52 (A)     Procalcitonin 01/19/2022 0.07    ]

## 2022-11-04 DIAGNOSIS — F31.62 BIPOLAR DISORDER, CURRENT EPISODE MIXED, MODERATE (HCC): ICD-10-CM

## 2022-11-04 RX ORDER — BUPROPION HYDROCHLORIDE 300 MG/1
300 TABLET ORAL
Qty: 90 TABLET | Refills: 3 | Status: SHIPPED | OUTPATIENT
Start: 2022-11-04 | End: 2023-11-14 | Stop reason: SDUPTHER

## 2022-11-04 NOTE — TELEPHONE ENCOUNTER
Received request via: Pharmacy    Was the patient seen in the last year in this department? Yes    Does the patient have an active prescription (recently filled or refills available) for medication(s) requested? No    Last OV: 8/19/22  Last labs: 2/9/22

## 2022-12-02 DIAGNOSIS — F31.62 BIPOLAR DISORDER, CURRENT EPISODE MIXED, MODERATE (HCC): ICD-10-CM

## 2022-12-02 NOTE — TELEPHONE ENCOUNTER
Was the patient seen in the last year in this department? Yes    Does patient have an active prescription for medications requested? Yes    Received Request Via: Pharmacy    Hospital Outpatient Visit on 02/09/2022   Component Date Value    Prostatic Specific Antig* 02/09/2022 1.59     25-Hydroxy   Vitamin D 25 02/09/2022 92     Cholesterol,Tot 02/09/2022 153     Triglycerides 02/09/2022 62     HDL 02/09/2022 63     LDL 02/09/2022 78     TSH 02/09/2022 2.830     Free T-4 02/09/2022 0.99     Sodium 02/09/2022 132 (L)     Potassium 02/09/2022 4.5     Chloride 02/09/2022 98     Co2 02/09/2022 20     Anion Gap 02/09/2022 14.0     Glucose 02/09/2022 95     Bun 02/09/2022 10     Creatinine 02/09/2022 0.96     Calcium 02/09/2022 9.3     AST(SGOT) 02/09/2022 19     ALT(SGPT) 02/09/2022 10     Alkaline Phosphatase 02/09/2022 70     Total Bilirubin 02/09/2022 0.2     Albumin 02/09/2022 4.2     Total Protein 02/09/2022 7.1     Globulin 02/09/2022 2.9     A-G Ratio 02/09/2022 1.4     WBC 02/09/2022 7.4     RBC 02/09/2022 3.89 (L)     Hemoglobin 02/09/2022 12.8 (L)     Hematocrit 02/09/2022 36.6 (L)     MCV 02/09/2022 94.1     MCH 02/09/2022 32.9     MCHC 02/09/2022 35.0     RDW 02/09/2022 47.8     Platelet Count 02/09/2022 194     MPV 02/09/2022 11.3     Neutrophils-Polys 02/09/2022 62.10     Lymphocytes 02/09/2022 26.40     Monocytes 02/09/2022 7.10     Eosinophils 02/09/2022 2.40     Basophils 02/09/2022 1.00     Immature Granulocytes 02/09/2022 1.00 (H)     Nucleated RBC 02/09/2022 0.00     Neutrophils (Absolute) 02/09/2022 4.58     Lymphs (Absolute) 02/09/2022 1.94     Monos (Absolute) 02/09/2022 0.52     Eos (Absolute) 02/09/2022 0.18     Baso (Absolute) 02/09/2022 0.07     Immature Granulocytes (a* 02/09/2022 0.07     NRBC (Absolute) 02/09/2022 0.00     Fasting Status 02/09/2022 Fasting     GFR If African American 02/09/2022 >60     GFR If Non  Ameri* 02/09/2022 >60    Admission on 01/15/2022, Discharged on  01/19/2022   Component Date Value    WBC 01/16/2022 6.6     RBC 01/16/2022 4.28 (L)     Hemoglobin 01/16/2022 13.9 (L)     Hematocrit 01/16/2022 40.7 (L)     MCV 01/16/2022 95.1     MCH 01/16/2022 32.5     MCHC 01/16/2022 34.2     RDW 01/16/2022 51.5 (H)     Platelet Count 01/16/2022 135 (L)     MPV 01/16/2022 11.5     Neutrophils-Polys 01/16/2022 79.60 (H)     Lymphocytes 01/16/2022 10.10 (L)     Monocytes 01/16/2022 6.60     Eosinophils 01/16/2022 0.20     Basophils 01/16/2022 0.30     Immature Granulocytes 01/16/2022 3.20 (H)     Nucleated RBC 01/16/2022 0.00     Neutrophils (Absolute) 01/16/2022 5.28     Lymphs (Absolute) 01/16/2022 0.67 (L)     Monos (Absolute) 01/16/2022 0.44     Eos (Absolute) 01/16/2022 0.01     Baso (Absolute) 01/16/2022 0.02     Immature Granulocytes (a* 01/16/2022 0.21 (H)     NRBC (Absolute) 01/16/2022 0.00     Sodium 01/16/2022 138     Potassium 01/16/2022 6.4 (H)     Chloride 01/16/2022 103     Co2 01/16/2022 9 (LL)     Anion Gap 01/16/2022 26.0 (H)     Glucose 01/16/2022 87     Bun 01/16/2022 106 (HH)     Creatinine 01/16/2022 15.74 (HH)     Calcium 01/16/2022 6.8 (LL)     AST(SGOT) 01/16/2022 14     ALT(SGPT) 01/16/2022 10     Alkaline Phosphatase 01/16/2022 58     Total Bilirubin 01/16/2022 <0.2     Albumin 01/16/2022 3.2     Total Protein 01/16/2022 5.7 (L)     Globulin 01/16/2022 2.5     A-G Ratio 01/16/2022 1.3     D-Dimer Screen 01/16/2022 1.06 (H)     Stat C-Reactive Protein 01/16/2022 11.43 (H)     Procalcitonin 01/16/2022 0.49 (H)     WBC 01/16/2022 6.5     RBC 01/16/2022 4.20 (L)     Hemoglobin 01/16/2022 13.5 (L)     Hematocrit 01/16/2022 39.7 (L)     MCV 01/16/2022 94.5     MCH 01/16/2022 32.1     MCHC 01/16/2022 34.0     RDW 01/16/2022 51.1 (H)     Platelet Count 01/16/2022 130 (L)     MPV 01/16/2022 11.2     GFR If  01/16/2022 4 (A)     GFR If Non  Ameri* 01/16/2022 3 (A)     GFR If  01/16/2022 4 (A)     GFR If Non  Ameri*  01/16/2022 3 (A)     Color 01/16/2022 Yellow     Character 01/16/2022 Clear     Specific Gravity 01/16/2022 1.015     Ph 01/16/2022 6.0     Glucose 01/16/2022 Negative     Ketones 01/16/2022 Negative     Protein 01/16/2022 30 (A)     Bilirubin 01/16/2022 Negative     Nitrite 01/16/2022 Negative     Leukocyte Esterase 01/16/2022 Negative     Occult Blood 01/16/2022 Small (A)     Micro Urine Req 01/16/2022 Microscopic     Creatinine, Urine 01/16/2022 152.04     Microalbumin, Urine Rand* 01/16/2022 6.2     Micro Alb Creat Ratio 01/16/2022 41 (H)     Hepatitis B Surface Anti* 01/16/2022 Non-Reactive     Hepatitis B Cors Ab,IgM 01/16/2022 Non-Reactive     Hepatitis A Virus Ab, IgM 01/16/2022 Non-Reactive     Hepatitis C Antibody 01/16/2022 Non-Reactive     Hep B Surface Antibody Q* 01/16/2022 <3.50     Total Protein, Urine 01/16/2022 56.0 (H)     Creatinine, Random Urine 01/16/2022 151.58     Protein Creatinine Ratio 01/16/2022 369 (H)     Sodium, Urine -per volume 01/16/2022 83     WBC 01/16/2022 5-10 (A)     RBC 01/16/2022 2-5 (A)     Bacteria 01/16/2022 Few (A)     Epithelial Cells 01/16/2022 Rare     Mucous Threads 01/16/2022 Few     Urine Crystals 01/16/2022 Few Amorphous     Hyaline Cast 01/16/2022 3-5 (A)     Hepatitis B Core Ab, Tot* 01/16/2022 NonReactive     PT 01/17/2022 14.3     INR 01/17/2022 1.20 (H)     Antinuclear Antibody 01/17/2022 None Detected     C3 Complement 01/17/2022 89.7     Complement C4 01/17/2022 30.9     Anti-Dna -Ds 01/17/2022 3     GBM Ab, IgG IFA 01/17/2022 Negative     Myeloperoxidase Ab 01/17/2022 0     Serine Proteinase 3, IgG 01/17/2022 1     Sodium 01/17/2022 140     Potassium 01/17/2022 3.6     Chloride 01/17/2022 104     Co2 01/17/2022 22     Glucose 01/17/2022 94     Creatinine 01/17/2022 4.06 (H)     Bun 01/17/2022 57 (H)     Calcium 01/17/2022 7.0 (L)     Phosphorus 01/17/2022 4.9 (H)     Albumin 01/17/2022 2.9 (L)     GFR If  01/17/2022 19 (A)     GFR If Non   Ameri* 01/17/2022 15 (A)     Occult Blood Feces 01/18/2022 Negative     Hemoglobin 01/17/2022 12.1 (L)     Hematocrit 01/17/2022 34.3 (L)     PT 01/17/2022 14.9 (H)     INR 01/17/2022 1.27 (H)     Significant Indicator 01/17/2022 NEG     Source 01/17/2022 BLD     Site 01/17/2022 PERIPHERAL     Culture Result 01/17/2022                      Value:No growth after 5 days of incubation.  Blood culture testing and Gram stain, if indicated, are  performed at [facility], [address] [place] [address].  Positive blood cultures are  sent to [facility], [address] [place] [address], for organism identification and  susceptibility testing.      Significant Indicator 01/17/2022 NEG     Source 01/17/2022 BLD     Site 01/17/2022 PERIPHERAL     Culture Result 01/17/2022                      Value:No growth after 5 days of incubation.  Blood culture testing and Gram stain, if indicated, are  performed at [facility], [address] [place] [address].  Positive blood cultures are  sent to [facility], [address] [place] [address], for organism identification and  susceptibility testing.      Procalcitonin 01/17/2022 0.21     Lactic Acid 01/17/2022 1.5     Sodium 01/18/2022 141     Potassium 01/18/2022 4.2     Chloride 01/18/2022 108     Co2 01/18/2022 21     Glucose 01/18/2022 131 (H)     Creatinine 01/18/2022 1.77 (H)     Bun 01/18/2022 39 (H)     Calcium 01/18/2022 7.3 (L)     Phosphorus 01/18/2022 2.1 (L)     Albumin 01/18/2022 2.8 (L)     WBC 01/18/2022 4.0 (L)     RBC 01/18/2022 3.47 (L)     Hemoglobin 01/18/2022 11.1 (L)     Hematocrit 01/18/2022 32.1 (L)     MCV 01/18/2022 92.5     MCH 01/18/2022 32.0     MCHC 01/18/2022 34.6     RDW 01/18/2022 48.6     Platelet Count 01/18/2022 148 (L)     MPV 01/18/2022 10.7     GFR If  01/18/2022 49 (A)     GFR If Non  Ameri* 01/18/2022 40 (A)     Sodium  01/19/2022 145     Potassium 01/19/2022 4.1     Chloride 01/19/2022 108     Co2 01/19/2022 24     Glucose 01/19/2022 121 (H)     Creatinine 01/19/2022 1.43 (H)     Bun 01/19/2022 29 (H)     Calcium 01/19/2022 7.8 (L)     Phosphorus 01/19/2022 1.9 (L)     Albumin 01/19/2022 2.9 (L)     Anion Gap 01/19/2022 13.0     GFR If  01/19/2022 >60     GFR If Non  Ameri* 01/19/2022 52 (A)     Procalcitonin 01/19/2022 0.07    ]

## 2022-12-04 RX ORDER — DIVALPROEX SODIUM 250 MG/1
500 TABLET, DELAYED RELEASE ORAL 2 TIMES DAILY
Qty: 360 TABLET | Refills: 3 | Status: SHIPPED | OUTPATIENT
Start: 2022-12-04 | End: 2023-11-14 | Stop reason: SDUPTHER

## 2022-12-28 ENCOUNTER — OFFICE VISIT (OUTPATIENT)
Dept: MEDICAL GROUP | Facility: CLINIC | Age: 57
End: 2022-12-28
Payer: MEDICAID

## 2022-12-28 VITALS
BODY MASS INDEX: 30.52 KG/M2 | DIASTOLIC BLOOD PRESSURE: 78 MMHG | SYSTOLIC BLOOD PRESSURE: 122 MMHG | TEMPERATURE: 97.9 F | HEIGHT: 65 IN | OXYGEN SATURATION: 95 % | HEART RATE: 99 BPM | RESPIRATION RATE: 18 BRPM | WEIGHT: 183.2 LBS

## 2022-12-28 DIAGNOSIS — L73.2 HIDRADENITIS SUPPURATIVA: ICD-10-CM

## 2022-12-28 DIAGNOSIS — L08.9 SOFT TISSUE INFECTION: ICD-10-CM

## 2022-12-28 DIAGNOSIS — Z23 NEED FOR VACCINATION: ICD-10-CM

## 2022-12-28 PROBLEM — R10.30 LOWER ABDOMINAL PAIN: Status: RESOLVED | Noted: 2022-06-15 | Resolved: 2022-12-28

## 2022-12-28 PROBLEM — U07.1 COVID-19 VIRUS INFECTION: Status: RESOLVED | Noted: 2022-01-15 | Resolved: 2022-12-28

## 2022-12-28 PROCEDURE — 90715 TDAP VACCINE 7 YRS/> IM: CPT | Performed by: PHYSICIAN ASSISTANT

## 2022-12-28 PROCEDURE — 90472 IMMUNIZATION ADMIN EACH ADD: CPT | Performed by: PHYSICIAN ASSISTANT

## 2022-12-28 PROCEDURE — 90746 HEPB VACCINE 3 DOSE ADULT IM: CPT | Performed by: PHYSICIAN ASSISTANT

## 2022-12-28 PROCEDURE — 99214 OFFICE O/P EST MOD 30 MIN: CPT | Mod: 25 | Performed by: PHYSICIAN ASSISTANT

## 2022-12-28 PROCEDURE — 90471 IMMUNIZATION ADMIN: CPT | Performed by: PHYSICIAN ASSISTANT

## 2022-12-28 PROCEDURE — 90677 PCV20 VACCINE IM: CPT | Performed by: PHYSICIAN ASSISTANT

## 2022-12-28 PROCEDURE — 90686 IIV4 VACC NO PRSV 0.5 ML IM: CPT | Performed by: PHYSICIAN ASSISTANT

## 2022-12-28 RX ORDER — DOXYCYCLINE HYCLATE 100 MG
100 TABLET ORAL 2 TIMES DAILY
Qty: 20 TABLET | Refills: 0 | Status: SHIPPED | OUTPATIENT
Start: 2022-12-28 | End: 2023-02-24

## 2022-12-28 ASSESSMENT — FIBROSIS 4 INDEX: FIB4 SCORE: 1.7

## 2022-12-28 NOTE — PROGRESS NOTES
Chief Complaint   Patient presents with    Rash     Pt c/o rash between legs x 2 weeks. Pt states he has drainage with bad odor       HISTORY OF PRESENT ILLNESS: Patient is a 57 y.o. male established patient who presents today to discuss the following issues:    Assessment/Plan  Hidradenitis suppurativa  Patient has small cyst like structures in bilateral groin folds. He states that they jose very painful and start draining foul smelling liquid. On exam he has hidradenitis suppurativa. No tracking between lesions is visible currently and looks to be healing. No fluctuance, mild edema, no drainage. Will start antibiotic for soft tissue infection and given instructions on bleach baths that will help decrease the bacteria load on the skin and help prevent recurrence.    Need for vaccination  Patient due for influenza, pneumonia and Tdap vaccines. Given in clinic today without adverse reaction.      Reviewed risks and benefits of treatment plan. Patient verbally agrees to plan of care.     Patient Active Problem List    Diagnosis Date Noted    Hidradenitis suppurativa 12/28/2022    Need for vaccination 12/28/2022    Alcohol use 01/16/2022    Chronic midline low back pain with bilateral sciatica 10/25/2021    Atherosclerosis of abdominal aorta (HCC) 10/25/2021    Hepatic steatosis 10/25/2021    Obesity (BMI 30-39.9) 10/14/2021    Vitamin D deficiency 08/04/2021    Mixed hyperlipidemia 08/04/2021    Essential hypertension 08/04/2021    Ichthyosis, X-linked 08/04/2021    Basal cell carcinoma (BCC) of skin of left upper extremity including shoulder 08/04/2021    Chronic bilateral lower abdominal pain 08/04/2021    Colitis 02/02/2021    Bilateral inguinal hernia 02/02/2021    GERD (gastroesophageal reflux disease) 04/02/2016    Bipolar affective (HCC) 04/02/2016       Allergies:Sulfamethoxazole, Abilify, Risperidone, and Omeprazole    Current Outpatient Medications   Medication Sig Dispense Refill    doxycycline (VIBRAMYCIN)  "100 MG Tab Take 1 Tablet by mouth 2 times a day. 20 Tablet 0    divalproex (DEPAKOTE) 250 MG Tablet Delayed Response Take 2 Tablets by mouth 2 times a day. 360 Tablet 3    buPROPion (WELLBUTRIN XL) 300 MG XL tablet Take 1 Tablet by mouth every day. 90 Tablet 3    amLODIPine (NORVASC) 5 MG Tab Take 1 Tablet by mouth every day. Indications: High Blood Pressure Disorder 90 Tablet 3    gabapentin (NEURONTIN) 300 MG Cap Take 1 Capsule by mouth 3 times a day. Indications: Neuropathic Pain 270 Capsule 3    lisinopril (PRINIVIL) 30 MG tablet Take 1 Tablet by mouth every day. Indications: High Blood Pressure Disorder 90 Tablet 3    pantoprazole (PROTONIX) 40 MG Tablet Delayed Response Take 1 Tablet by mouth every day. 90 Tablet 0    calcium carbonate (TUMS) 500 MG Chew Tab Chew 500-1,500 mg every day.      rosuvastatin (CRESTOR) 10 MG Tab Take 1 Tablet by mouth every evening. Indications: Disease of the Heart or Blood Vessels 90 Tablet 3    Cholecalciferol (VITAMIN D3) 50 MCG (2000 UT) Tab Take 1 Tablet by mouth every day. (Patient not taking: Reported on 12/28/2022) 90 Tablet 3     No current facility-administered medications for this visit.       Wt Readings from Last 3 Encounters:   12/28/22 83.1 kg (183 lb 3.2 oz)   08/19/22 83.7 kg (184 lb 9.6 oz)   06/15/22 85.7 kg (189 lb)   ]    Exam:  /78 (BP Location: Left arm, Patient Position: Sitting, BP Cuff Size: Adult)   Pulse 99   Temp 36.6 °C (97.9 °F) (Temporal)   Resp 18   Ht 1.638 m (5' 4.5\")   Wt 83.1 kg (183 lb 3.2 oz)   SpO2 95%  Body mass index is 30.96 kg/m².   General:  Well nourished, well developed male. No apparent distress. Not ill appearing.  Eyes: EOM intact, PERRL, conjunctiva non-injected, sclera non-icteric.  Neck: Supple with no cervical lymphadenopathy, JVD, palpable thyroid nodules or carotid bruits.  Pulmonary: Clear to ausculation bilaterally. Normal effort. No rales, ronchi, or wheezing.  Cardiovascular: Regular rate and rhythm without " murmur, rub or gallop.   Groin: Multiple small, cyst like lesions. No drainage or fluctuance currently. Mild erythema, darkening of the skin over the lesions in bilateral groin folds.  Extremities: Full range of motion. Warm and well perfused with no edema.  Skin: Intact with no obvious rashes or lesions.  Neuro: Cranial nerves I-XII grossly intact.  Psych: Alert and oriented x 3.  Appropriately dressed. Mood and affect appropriate.    Return if symptoms worsen or fail to improve.  Please note that this dictation was created using voice recognition software. I have made every reasonable attempt to correct obvious errors, but I expect that there are errors of grammar and possibly content that I did not discover before finalizing the note.

## 2022-12-29 NOTE — ASSESSMENT & PLAN NOTE
Patient due for influenza, pneumonia and Tdap vaccines. Given in clinic today without adverse reaction.

## 2022-12-29 NOTE — ASSESSMENT & PLAN NOTE
Patient has small cyst like structures in bilateral groin folds. He states that they jose very painful and start draining foul smelling liquid. On exam he has hidradenitis suppurativa. No tracking between lesions is visible currently and looks to be healing. No fluctuance, mild edema, no drainage. Will start antibiotic for soft tissue infection and given instructions on bleach baths that will help decrease the bacteria load on the skin and help prevent recurrence.

## 2023-01-12 ENCOUNTER — TELEPHONE (OUTPATIENT)
Dept: MEDICAL GROUP | Facility: CLINIC | Age: 58
End: 2023-01-12

## 2023-01-13 NOTE — TELEPHONE ENCOUNTER
VOICEMAIL  1. Caller Name: Herman Chiang  Call Back Number: [phone] (home)       2. Message: Pt called and wanted us to update his phone number, called pt back to confirm . Pt sts he has not received his cologuard and said this was the second time it had be sent for him. Confirmed address with pt and we did not have the correct address on file. Updated to current address and pt is requesting the cologuard be sent to him again if possible.    3. Patient approves office to leave a detailed voicemail/MyChart message: yes

## 2023-02-24 ENCOUNTER — OFFICE VISIT (OUTPATIENT)
Dept: MEDICAL GROUP | Facility: CLINIC | Age: 58
End: 2023-02-24
Payer: MEDICAID

## 2023-02-24 VITALS
HEART RATE: 62 BPM | TEMPERATURE: 98.5 F | HEIGHT: 65 IN | BODY MASS INDEX: 29.32 KG/M2 | SYSTOLIC BLOOD PRESSURE: 120 MMHG | WEIGHT: 176 LBS | RESPIRATION RATE: 18 BRPM | OXYGEN SATURATION: 98 % | DIASTOLIC BLOOD PRESSURE: 80 MMHG

## 2023-02-24 DIAGNOSIS — L73.2 HIDRADENITIS SUPPURATIVA: ICD-10-CM

## 2023-02-24 DIAGNOSIS — D64.9 ANEMIA, UNSPECIFIED TYPE: ICD-10-CM

## 2023-02-24 DIAGNOSIS — E55.9 VITAMIN D DEFICIENCY: ICD-10-CM

## 2023-02-24 DIAGNOSIS — E78.2 MIXED HYPERLIPIDEMIA: ICD-10-CM

## 2023-02-24 DIAGNOSIS — Z00.00 ROUTINE PHYSICAL EXAMINATION: ICD-10-CM

## 2023-02-24 DIAGNOSIS — R73.01 ELEVATED FASTING BLOOD SUGAR: ICD-10-CM

## 2023-02-24 DIAGNOSIS — Z11.4 SCREENING FOR HIV WITHOUT PRESENCE OF RISK FACTORS: ICD-10-CM

## 2023-02-24 DIAGNOSIS — C44.619 BASAL CELL CARCINOMA (BCC) OF SKIN OF LEFT UPPER EXTREMITY INCLUDING SHOULDER: ICD-10-CM

## 2023-02-24 DIAGNOSIS — K76.0 HEPATIC STEATOSIS: ICD-10-CM

## 2023-02-24 DIAGNOSIS — Z78.9 ALCOHOL USE: ICD-10-CM

## 2023-02-24 DIAGNOSIS — Q80.1 ICHTHYOSIS, X-LINKED: ICD-10-CM

## 2023-02-24 PROCEDURE — 99214 OFFICE O/P EST MOD 30 MIN: CPT | Performed by: PHYSICIAN ASSISTANT

## 2023-02-24 RX ORDER — TETRACYCLINE HYDROCHLORIDE 500 MG/1
500 CAPSULE ORAL 2 TIMES DAILY
Qty: 30 CAPSULE | Refills: 0 | Status: SHIPPED | OUTPATIENT
Start: 2023-02-24 | End: 2023-11-14

## 2023-02-24 ASSESSMENT — FIBROSIS 4 INDEX: FIB4 SCORE: 1.7

## 2023-02-24 NOTE — PROGRESS NOTES
Chief Complaint   Patient presents with    Skin Lesion     Neck/ soft tissue mass        HISTORY OF PRESENT ILLNESS: Patient is a 57 y.o. male established patient who presents today to discuss the following issues:    Assessment/Plan  Hidradenitis suppurativa  Chronic condition. He is having a recurrence in the same area of the groin. He states that last time he took the antibiotics and took a bleach bath but has never taken another one. We have discussed tht he should be taking a bleach bath 1-2 times a week when he starts to develop symptoms. He should do this at the first sign to try and prevent them getting severe enough to need antibiotics. He thought that once he took the bleach bath he did not have to take another. Will start tetracycline twice a day and have him start with the bleach baths. He has been instructed not to add excess bleach to his bath water as it will not heal it faster but it can dry and damage the skin.    Ichthyosis, X-linked  Patient has a new soft tissue mass at the site of a previous skin cancer removal. He is concerned that the cancer has returned. He is due for a skin check as well. He needs a new referral to dermatology and the last one is over a year old and they will not make an appointment until the referral is updated. New referral placed and requested it to be sent to the same office.      Reviewed risks and benefits of treatment plan. Patient verbally agrees to plan of care.     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 30 Capsule 0    pantoprazole (PROTONIX) 40 MG Tablet Delayed Response TAKE 1 TABLET BY MOUTH EVERY DAY 90 Tablet 1    divalproex (DEPAKOTE) 250 MG Tablet Delayed Response Take 2 Tablets by mouth 2 times a day. 360 Tablet 3    buPROPion (WELLBUTRIN XL) 300 MG XL tablet Take 1 Tablet by mouth every day. 90 Tablet 3    amLODIPine (NORVASC) 5 MG Tab Take 1 Tablet by mouth every day. Indications: High Blood Pressure Disorder 90 Tablet 3    gabapentin (NEURONTIN) 300 MG Cap Take 1 Capsule by mouth 3 times a day. Indications: Neuropathic Pain 270 Capsule 3    lisinopril (PRINIVIL) 30 MG tablet Take 1 Tablet by mouth every day. Indications: High Blood Pressure Disorder 90 Tablet 3    calcium carbonate (TUMS) 500 MG Chew Tab Chew 500-1,500 mg every day.      Cholecalciferol (VITAMIN D3) 50 MCG (2000 UT) Tab Take 1 Tablet by mouth every day. 90 Tablet 3    rosuvastatin (CRESTOR) 10 MG Tab Take 1 Tablet by mouth every evening. Indications: Disease of the Heart or Blood Vessels 90 Tablet 3     No current facility-administered medications for this visit.       Wt Readings from Last 3 Encounters:   02/24/23 79.8 kg (176 lb)   12/28/22 83.1 kg (183 lb 3.2 oz)   08/19/22 83.7 kg (184 lb 9.6 oz)   ]    Exam:  /80 (BP Location: Left arm, Patient Position: Sitting, BP Cuff Size: Adult long)   Pulse 62   Temp 36.9 °C (98.5 °F) (Temporal)   Resp 18   Ht 1.638 m (5' 4.5\")   Wt 79.8 kg (176 lb)   SpO2 98%  Body mass index is 29.74 kg/m².   General: "  Well nourished, well developed male. No apparent distress. Not ill appearing.  Eyes: EOM intact, PERRL, conjunctiva non-injected, sclera non-icteric.  Neck: Supple with no cervical lymphadenopathy, JVD, palpable thyroid nodules or carotid bruits.  Pulmonary: Clear to ausculation bilaterally. Normal effort. No rales, ronchi, or wheezing.  Cardiovascular: Regular rate and rhythm without murmur, rub or gallop.   Extremities: Full range of motion. Warm and well perfused with no edema.  Skin: Intact with no obvious rashes or lesions.  Neuro: Cranial nerves I-XII grossly intact.  Psych: Alert and oriented x 3.  Appropriately dressed. Mood and affect appropriate.    Return in about 4 weeks (around 3/24/2023) for f/u labs.  Please note that this dictation was created using voice recognition software. I have made every reasonable attempt to correct obvious errors, but I expect that there are errors of grammar and possibly content that I did not discover before finalizing the note.

## 2023-02-25 NOTE — ASSESSMENT & PLAN NOTE
Patient has a new soft tissue mass at the site of a previous skin cancer removal. He is concerned that the cancer has returned. He is due for a skin check as well. He needs a new referral to dermatology and the last one is over a year old and they will not make an appointment until the referral is updated. New referral placed and requested it to be sent to the same office.

## 2023-02-25 NOTE — ASSESSMENT & PLAN NOTE
Chronic condition. He is having a recurrence in the same area of the groin. He states that last time he took the antibiotics and took a bleach bath but has never taken another one. We have discussed tht he should be taking a bleach bath 1-2 times a week when he starts to develop symptoms. He should do this at the first sign to try and prevent them getting severe enough to need antibiotics. He thought that once he took the bleach bath he did not have to take another. Will start tetracycline twice a day and have him start with the bleach baths. He has been instructed not to add excess bleach to his bath water as it will not heal it faster but it can dry and damage the skin.

## 2023-03-29 ENCOUNTER — OFFICE VISIT (OUTPATIENT)
Dept: MEDICAL GROUP | Facility: CLINIC | Age: 58
End: 2023-03-29
Payer: MEDICAID

## 2023-03-29 VITALS
SYSTOLIC BLOOD PRESSURE: 122 MMHG | HEART RATE: 111 BPM | DIASTOLIC BLOOD PRESSURE: 78 MMHG | BODY MASS INDEX: 29.42 KG/M2 | WEIGHT: 176.6 LBS | OXYGEN SATURATION: 96 % | TEMPERATURE: 97.7 F | HEIGHT: 65 IN | RESPIRATION RATE: 18 BRPM

## 2023-03-29 DIAGNOSIS — G89.29 CHRONIC BILATERAL LOWER ABDOMINAL PAIN: ICD-10-CM

## 2023-03-29 DIAGNOSIS — R10.32 CHRONIC BILATERAL LOWER ABDOMINAL PAIN: ICD-10-CM

## 2023-03-29 DIAGNOSIS — N18.31 STAGE 3A CHRONIC KIDNEY DISEASE: ICD-10-CM

## 2023-03-29 DIAGNOSIS — R10.31 CHRONIC BILATERAL LOWER ABDOMINAL PAIN: ICD-10-CM

## 2023-03-29 PROCEDURE — 99214 OFFICE O/P EST MOD 30 MIN: CPT | Performed by: PHYSICIAN ASSISTANT

## 2023-03-29 ASSESSMENT — FIBROSIS 4 INDEX: FIB4 SCORE: 1.7

## 2023-03-29 NOTE — PROGRESS NOTES
"Chief Complaint   Patient presents with    Follow-Up     Digestive     Results     Labs- in media        HISTORY OF PRESENT ILLNESS: Patient is a 57 y.o. male established patient who presents today to discuss the following issues:    Assessment/Plan  Chronic bilateral lower abdominal pain  Patient had an appointment with GI for evaluation of his chronic lower abdomen pain. He was upset with who he was seen by as he felt tthat she did not listed to what he was trying to tell her and that he was exaggerating about his symptoms. He states that she told him that if he was truly having all of the symptoms that he states that he should be so ill that he should be in the hospital. He states that after that point she \"blew him off and did not listen to another word\". He states that he will not go back to see her. She did order upper GI and colonoscopy and a ct of his abdomen with and without contrast. I have strongly encouraged him to follow through with the testing as soon as he is able to get it scheduled. He can always request to switch to another provider within the clinic for his testing and follow up. He states that he will do that and follow up as he has been instructed to do.    Stage 3a chronic kidney disease (HCC)  Most recent labs done at [facility] lab show eGFR of 46. This has decreased since the last set of labs but continues to remain above where it was last year that resulted in his hospitalization. We will continue to monitor every 6 months to a year. He has been given instructions that he should let providers know if anyone tries to give him any medications so that they can accurately dose the medications based on his kidney function. He should avoid acetaminophen (Tylenol) and any products containing it. Id he needs to take anything for pain he can take ibuprofen, but that too should be in moderation and for the shortest time possible. He will follow up in 6 months.      Reviewed risks and benefits of " treatment plan. Patient verbally agrees to plan of care.     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 30 Capsule 0    pantoprazole (PROTONIX) 40 MG Tablet Delayed Response TAKE 1 TABLET BY MOUTH EVERY DAY 90 Tablet 1    divalproex (DEPAKOTE) 250 MG Tablet Delayed Response Take 2 Tablets by mouth 2 times a day. 360 Tablet 3    buPROPion (WELLBUTRIN XL) 300 MG XL tablet Take 1 Tablet by mouth every day. 90 Tablet 3    amLODIPine (NORVASC) 5 MG Tab Take 1 Tablet by mouth every day. Indications: High Blood Pressure Disorder 90 Tablet 3    gabapentin (NEURONTIN) 300 MG Cap Take 1 Capsule by mouth 3 times a day. Indications: Neuropathic Pain 270 Capsule 3    lisinopril (PRINIVIL) 30 MG tablet Take 1 Tablet by mouth every day. Indications: High Blood Pressure Disorder 90 Tablet 3    calcium carbonate (TUMS) 500 MG Chew Tab Chew 500-1,500 mg every day.      rosuvastatin (CRESTOR) 10 MG Tab Take 1 Tablet by mouth every evening.  "Indications: Disease of the Heart or Blood Vessels 90 Tablet 3     No current facility-administered medications for this visit.       Wt Readings from Last 3 Encounters:   03/29/23 80.1 kg (176 lb 9.6 oz)   02/24/23 79.8 kg (176 lb)   12/28/22 83.1 kg (183 lb 3.2 oz)   ]    Exam:  /78 (BP Location: Right arm, Patient Position: Sitting, BP Cuff Size: Adult long)   Pulse (!) 111   Temp 36.5 °C (97.7 °F) (Temporal)   Resp 18   Ht 1.638 m (5' 4.5\")   Wt 80.1 kg (176 lb 9.6 oz)   SpO2 96%  Body mass index is 29.85 kg/m².   General:  Well nourished, well developed male. No apparent distress. Not ill appearing.  Eyes: EOM intact, PERRL, conjunctiva non-injected, sclera non-icteric.  Neck: Supple with no cervical lymphadenopathy, JVD, palpable thyroid nodules or carotid bruits.  Pulmonary: Clear to ausculation bilaterally. Normal effort. No rales, rhonchi, or wheezing.  Cardiovascular: Regular rate and rhythm without murmur, rub or gallop.   Extremities: Full range of motion. Warm and well perfused with no edema.  Skin: Intact with no obvious rashes or lesions.  Neuro: Cranial nerves I-XII grossly intact.  Psych: Alert and oriented x 3.  Appropriately dressed. Mood and affect appropriate.    Return in about 6 months (around 9/29/2023) for f/u GI testing.  Please note that this dictation was created using voice recognition software. I have made every reasonable attempt to correct obvious errors, but I expect that there are errors of grammar and possibly content that I did not discover before finalizing the note.    "

## 2023-04-03 PROBLEM — N18.31 STAGE 3A CHRONIC KIDNEY DISEASE: Status: ACTIVE | Noted: 2023-04-03

## 2023-04-03 NOTE — ASSESSMENT & PLAN NOTE
"Patient had an appointment with GI for evaluation of his chronic lower abdomen pain. He was upset with who he was seen by as he felt tthat she did not listed to what he was trying to tell her and that he was exaggerating about his symptoms. He states that she told him that if he was truly having all of the symptoms that he states that he should be so ill that he should be in the hospital. He states that after that point she \"blew him off and did not listen to another word\". He states that he will not go back to see her. She did order upper GI and colonoscopy and a ct of his abdomen with and without contrast. I have strongly encouraged him to follow through with the testing as soon as he is able to get it scheduled. He can always request to switch to another provider within the clinic for his testing and follow up. He states that he will do that and follow up as he has been instructed to do.  "

## 2023-04-03 NOTE — ASSESSMENT & PLAN NOTE
Most recent labs done at [facility] lab show eGFR of 46. This has decreased since the last set of labs but continues to remain above where it was last year that resulted in his hospitalization. We will continue to monitor every 6 months to a year. He has been given instructions that he should let providers know if anyone tries to give him any medications so that they can accurately dose the medications based on his kidney function. He should avoid acetaminophen (Tylenol) and any products containing it. Id he needs to take anything for pain he can take ibuprofen, but that too should be in moderation and for the shortest time possible. He will follow up in 6 months.

## 2023-06-02 NOTE — ED NOTES
XR at bedside   [[email or web address]] : 66 M here c/o 1 episode of cough with black mucus 3 weeks ago. No epsiode after\par denies any Rhinitis, persistent cough, sinus congestion, throat irritation\par Denies fever, body aches earache\par Denies SOB or wheezing or CP or palpitations\par Denies NVD\par Denies HA\par + covid vaccinated\par denies any recent travel\par denies any recent sick contacts\par denies any loss of taste or smell.\par following  Dr Kaufman - [facility] saw him 2 weeks ago and told him acupuncture not helping\par Pt has been c/o of chronic weakness for 4 years on L side - never saw neurology - need neurology referral \par Pt went hospital over 1 month ago for weakness in left lower extremity and went to [facility]\par had xray and reports possible CT of head and d/c home. no admission. - pt will send records and imaging from ED visit.\par Pt has no further complaints.\par Pt denies any ha, dizziness, tinnitus, lightheadedness, epistaxis, vision changes, chest pain, palpitations,  sob, syncopal episodes, HSIEH, or leg swelling, n/v abdominal pain.\par  \par \par

## 2023-07-25 DIAGNOSIS — K21.9 GASTROESOPHAGEAL REFLUX DISEASE WITHOUT ESOPHAGITIS: ICD-10-CM

## 2023-07-25 NOTE — TELEPHONE ENCOUNTER
Received request via: Pharmacy    Was the patient seen in the last year in this department? Yes    Does the patient have an active prescription (recently filled or refills available) for medication(s) requested? No    Does the patient have [facility] and need 100 day supply (blood pressure, diabetes and cholesterol meds only)? Patient does not have SCP    Last OV: 3/29/23  Last labs: 2/9/22

## 2023-07-26 RX ORDER — PANTOPRAZOLE SODIUM 40 MG/1
40 TABLET, DELAYED RELEASE ORAL
Qty: 90 TABLET | Refills: 3 | Status: SHIPPED | OUTPATIENT
Start: 2023-07-26 | End: 2023-11-14 | Stop reason: SDUPTHER

## 2023-08-09 ENCOUNTER — APPOINTMENT (RX ONLY)
Dept: URBAN - METROPOLITAN AREA CLINIC 36 | Facility: CLINIC | Age: 58
Setting detail: DERMATOLOGY
End: 2023-08-09

## 2023-08-09 ENCOUNTER — RX ONLY (OUTPATIENT)
Age: 58
Setting detail: RX ONLY
End: 2023-08-09

## 2023-08-09 PROBLEM — C44.42 SQUAMOUS CELL CARCINOMA OF SKIN OF SCALP AND NECK: Status: ACTIVE | Noted: 2023-08-09

## 2023-08-09 PROCEDURE — ? MOHS SURGERY

## 2023-08-09 PROCEDURE — ? REFERRAL CORRESPONDENCE

## 2023-08-09 PROCEDURE — 13122 CMPLX RPR S/A/L ADDL 5 CM/>: CPT

## 2023-08-09 PROCEDURE — 17312 MOHS ADDL STAGE: CPT

## 2023-08-09 PROCEDURE — 17311 MOHS 1 STAGE H/N/HF/G: CPT

## 2023-08-09 PROCEDURE — 13121 CMPLX RPR S/A/L 2.6-7.5 CM: CPT

## 2023-08-09 RX ORDER — HYDROCODONE BITARTRATE AND ACETAMINOPHEN 7.5; 325 MG/1; MG/1
TABLET ORAL
Qty: 20 | Refills: 0 | Status: CANCELLED
Stop reason: SDUPTHER

## 2023-08-09 RX ORDER — HYDROCODONE BITARTRATE AND ACETAMINOPHEN 7.5; 325 MG/1; MG/1
TABLET ORAL
Qty: 20 | Refills: 0 | Status: ERX | COMMUNITY
Start: 2023-08-09

## 2023-08-09 NOTE — PROCEDURE: MOHS SURGERY
Mohs Case Number: 
Biopsy Photograph Reviewed: Yes
Referring Physician (Optional): Savannah POWELL
Consent Type: Consent 1 (Standard)
Eye Shield Used: No
Surgeon Performing Repair (Optional): William
Initial Size Of Lesion: 2
X Size Of Lesion In Cm (Optional): 1.5
Primary Defect Length In Cm (Final Defect Size - Required For Flaps/Grafts): 4
Primary Defect Width In Cm (Final Defect Size - Required For Flaps/Grafts): 3
Repair Type: Complex Repair
Oculoplastic Surgeon (A): Saeed
Oculoplastic Surgeon Procedure Text (A): After obtaining clear surgical margins the patient was sent to oculoplastics for surgical repair.  The patient understands they will receive post-surgical care and follow-up from the referring physician's office.
Otolaryngologist Procedure Text (A): After obtaining clear surgical margins the patient was sent to otolaryngology for surgical repair.  The patient understands they will receive post-surgical care and follow-up from the referring physician's office.
Plastic Surgeon Procedure Text (A): After obtaining clear surgical margins the patient was sent to plastics for surgical repair.  The patient understands they will receive post-surgical care and follow-up from the referring physician's office.
Mid-Level (A): did
Mid-Level Procedure Text (A): After obtaining clear surgical margins the patient was sent to a mid-level provider for surgical repair.  The patient understands they will receive post-surgical care and follow-up from the mid-level provider.
Provider Procedure Text (A): After obtaining clear surgical margins the defect was repaired by another provider.
Asc Procedure Text (A): After obtaining clear surgical margins the patient was sent to an ASC for surgical repair.  The patient understands they will receive post-surgical care and follow-up from the ASC physician.
Simple / Intermediate / Complex Repair - Final Wound Length In Cm: 8
Suturegard Retention Suture: 2-0 Nylon
Retention Suture Bite Size: 3 mm
Length To Time In Minutes Device Was In Place: 10
Number Of Hemigard Strips Per Side: 1
Undermining Type: Entire Wound
Debridement Text: The wound edges were debrided prior to proceeding with the closure to facilitate wound healing.
Helical Rim Text: The closure involved the helical rim.
Vermilion Border Text: The closure involved the vermilion border.
Nostril Rim Text: The closure involved the nostril rim.
Retention Suture Text: Retention sutures were placed to support the closure and prevent dehiscence.
Secondary Defect Length In Cm (Required For Flaps): 0
Area H Indication Text: Tumors in this location are included in Area H (eyelids, eyebrows, nose, lips, chin, ear, pre-auricular, post-auricular, temple, genitalia, hands, feet, ankles and areola).  Tissue conservation is critical in these anatomic locations.
Area M Indication Text: Tumors in this location are included in Area M (cheek, forehead, scalp, neck, jawline and pretibial skin).  Mohs surgery is indicated for tumors in these anatomic locations.
Area L Indication Text: Tumors in this location are included in Area L (trunk and extremities).  Mohs surgery is indicated for larger tumors, or tumors with aggressive histologic features, in these anatomic locations.
Depth Of Tumor Invasion (For Histology): muscle
Perineural Invasion (For Histology - Be Specific If Possible): present in subdermal nerves
Presence Of Scar Tissue (For Histology): present
Surgical Defect Length In Cm (Optional): 3.3
Special Stains Stage 1 - Results: Base On Clearance Noted Above
Stage 2: Additional Anesthesia Type: 1% lidocaine with 1:100,000 epinephrine and 408mcg clindamycin/ml and a 1:10 solution of 8.4% sodium bicarbonate
Surgical Defect Length In Cm (Optional): 4.0
Surgical Defect Width In Cm (Optional): 3.0
Stage 2 Add-On Histology Text: parotid tissue present
Stage 4: Additional Anesthesia Type: 1% lidocaine with epinephrine
Staging Info: By selecting yes to the question above you will include information on AJCC 8 tumor staging in your Mohs note. Information on tumor staging will be automatically added for SCCs on the head and neck. AJCC 8 includes tumor size, tumor depth, perineural involvement and bone invasion.
Tumor Depth: Less than 6mm from granular layer and no invasion beyond the subcutaneous fat
Was The Patient On Physician Recommended Anticoagulation Therapy?: Please Select the Appropriate Response
Medical Necessity Statement: Based on my medical judgement, Mohs surgery is the most appropriate treatment for this cancer compared to other treatments.
Alternatives Discussed Intro (Do Not Add Period): I discussed alternative treatments to Mohs surgery and specifically discussed the risks and benefits of
Consent 1/Introductory Paragraph: The rationale for Mohs was explained to the patient and consent was obtained. The risks, benefits and alternatives to therapy were discussed in detail. Specifically, the risks of infection, scarring, bleeding, prolonged wound healing, incomplete removal, allergy to anesthesia, nerve injury and recurrence were addressed. Prior to the procedure, the treatment site was clearly identified and confirmed by the patient. All components of Universal Protocol/PAUSE Rule completed.
Consent 2/Introductory Paragraph: Mohs surgery was explained to the patient and consent was obtained. The risks, benefits and alternatives to therapy were discussed in detail. Specifically, the risks of infection, scarring, bleeding, prolonged wound healing, incomplete removal, allergy to anesthesia, nerve injury and recurrence were addressed. Prior to the procedure, the treatment site was clearly identified and confirmed by the patient. All components of Universal Protocol/PAUSE Rule completed.
Consent 3/Introductory Paragraph: I gave the patient a chance to ask questions they had about the procedure.  Following this I explained the Mohs procedure and consent was obtained. The risks, benefits and alternatives to therapy were discussed in detail. Specifically, the risks of infection, scarring, bleeding, prolonged wound healing, incomplete removal, allergy to anesthesia, nerve injury and recurrence were addressed. Prior to the procedure, the treatment site was clearly identified and confirmed by the patient. All components of Universal Protocol/PAUSE Rule completed.
Consent (Temporal Branch)/Introductory Paragraph: The rationale for Mohs was explained to the patient and consent was obtained. The risks, benefits and alternatives to therapy were discussed in detail. Specifically, the risks of damage to the temporal branch of the facial nerve, infection, scarring, bleeding, prolonged wound healing, incomplete removal, allergy to anesthesia, and recurrence were addressed. Prior to the procedure, the treatment site was clearly identified and confirmed by the patient. All components of Universal Protocol/PAUSE Rule completed.
Consent (Marginal Mandibular)/Introductory Paragraph: The rationale for Mohs was explained to the patient and consent was obtained. The risks, benefits and alternatives to therapy were discussed in detail. Specifically, the risks of damage to the marginal mandibular branch of the facial nerve, infection, scarring, bleeding, prolonged wound healing, incomplete removal, allergy to anesthesia, and recurrence were addressed. Prior to the procedure, the treatment site was clearly identified and confirmed by the patient. All components of Universal Protocol/PAUSE Rule completed.
Consent (Spinal Accessory)/Introductory Paragraph: The rationale for Mohs was explained to the patient and consent was obtained. The risks, benefits and alternatives to therapy were discussed in detail. Specifically, the risks of damage to the spinal accessory nerve, infection, scarring, bleeding, prolonged wound healing, incomplete removal, allergy to anesthesia, and recurrence were addressed. Prior to the procedure, the treatment site was clearly identified and confirmed by the patient. All components of Universal Protocol/PAUSE Rule completed.
Consent (Near Eyelid Margin)/Introductory Paragraph: The rationale for Mohs was explained to the patient and consent was obtained. The risks, benefits and alternatives to therapy were discussed in detail. Specifically, the risks of ectropion or eyelid deformity, infection, scarring, bleeding, prolonged wound healing, incomplete removal, allergy to anesthesia, nerve injury and recurrence were addressed. Prior to the procedure, the treatment site was clearly identified and confirmed by the patient. All components of Universal Protocol/PAUSE Rule completed.
Consent (Ear)/Introductory Paragraph: The rationale for Mohs was explained to the patient and consent was obtained. The risks, benefits and alternatives to therapy were discussed in detail. Specifically, the risks of ear deformity, infection, scarring, bleeding, prolonged wound healing, incomplete removal, allergy to anesthesia, nerve injury and recurrence were addressed. Prior to the procedure, the treatment site was clearly identified and confirmed by the patient. All components of Universal Protocol/PAUSE Rule completed.
Consent (Nose)/Introductory Paragraph: The rationale for Mohs was explained to the patient and consent was obtained. The risks, benefits and alternatives to therapy were discussed in detail. Specifically, the risks of nasal deformity, changes in the flow of air through the nose, infection, scarring, bleeding, prolonged wound healing, incomplete removal, allergy to anesthesia, nerve injury and recurrence were addressed. Prior to the procedure, the treatment site was clearly identified and confirmed by the patient. All components of Universal Protocol/PAUSE Rule completed.
Consent (Lip)/Introductory Paragraph: The rationale for Mohs was explained to the patient and consent was obtained. The risks, benefits and alternatives to therapy were discussed in detail. Specifically, the risks of lip deformity, changes in the oral aperture, infection, scarring, bleeding, prolonged wound healing, incomplete removal, allergy to anesthesia, nerve injury and recurrence were addressed. Prior to the procedure, the treatment site was clearly identified and confirmed by the patient. All components of Universal Protocol/PAUSE Rule completed.
Consent (Scalp)/Introductory Paragraph: The rationale for Mohs was explained to the patient and consent was obtained. The risks, benefits and alternatives to therapy were discussed in detail. Specifically, the risks of changes in hair growth pattern secondary to repair, infection, scarring, bleeding, prolonged wound healing, incomplete removal, allergy to anesthesia, nerve injury and recurrence were addressed. Prior to the procedure, the treatment site was clearly identified and confirmed by the patient. All components of Universal Protocol/PAUSE Rule completed.
Detail Level: Detailed
Postop Diagnosis: same
Anesthesia Type: 1% lidocaine with 1:100,000 epinephrine and a 1:10 solution of 8.4% sodium bicarbonate
Anesthesia Volume In Cc: 2.2
Additional Anesthesia Volume In Cc: 6
Hemostasis: Electrocautery
Estimated Blood Loss (Cc): less than 5 cc
Repair Anesthesia Method: local infiltration
Brow Lift Text: A midfrontal incision was made medially to the defect to allow access to the tissues just superior to the left eyebrow. Following careful dissection inferiorly in a supraperiosteal plane to the level of the left eyebrow, several 3-0 monocryl sutures were used to resuspend the eyebrow orbicularis oculi muscular unit to the superior frontal bone periosteum. This resulted in an appropriate reapproximation of static eyebrow symmetry and correction of the left brow ptosis.
Deep Sutures: 2-0 Polysorb
Epidermal Sutures: 3-0 Prolene
Epidermal Closure: running cuticular
Suturegard Intro: Intraoperative tissue expansion was performed, utilizing the SUTUREGARD device, in order to reduce wound tension.
Suturegard Body: The suture ends were repeatedly re-tightened and re-clamped to achieve the desired tissue expansion.
Hemigard Intro: Due to skin fragility and wound tension, it was decided to use HEMIGARD adhesive retention suture devices to permit a linear closure. The skin was cleaned and dried for a 6cm distance away from the wound. Excessive hair, if present, was removed to allow for adhesion.
Hemigard Postcare Instructions: The HEMIGARD strips are to remain completely dry for at least 5-7 days.
Donor Site Anesthesia Type: same as repair anesthesia
Graft Basting Suture (Optional): 5-0 Fast Absorbing Gut
Graft Donor Site Epidermal Sutures (Optional): 5-0 Ethibond
Epidermal Closure Graft Donor Site (Optional): simple interrupted
Graft Donor Site Bandage (Optional-Leave Blank If You Don't Want In Note): Aquaphor and telefa placed on wound. Pressure dressing applied to donor site
Closure 2 Information: This tab is for additional flaps and grafts, including complex repair and grafts and complex repair and flaps. You can also specify a different location for the additional defect, if the location is the same you do not need to select a new one. We will insert the automated text for the repair you select below just as we do for solitary flaps and grafts. Please note that at this time if you select a location with a different insurance zone you will need to override the ICD10 and CPT if appropriate.
Closure 3 Information: This tab is for additional flaps and grafts above and beyond our usual structured repairs.  Please note if you enter information here it will not currently bill and you will need to add the billing information manually.
Wound Care: Aquaphor
Dressing: dry sterile dressing
Wound Care (No Sutures): Petrolatum
Suture Removal: 7 days
Unna Boot Text: An Unna boot was placed to help immobilize the limb and facilitate more rapid healing.
Home Suture Removal Text: Patient was provided instructions on removing sutures and will remove their sutures at home.  If they have any questions or difficulties they will call the office.
Post-Care Instructions: I reviewed with the patient in detail post-care instructions. Patient is not to engage in any heavy lifting, exercise, or swimming for the next 14 days. Should the patient develop any fevers, chills, bleeding, severe pain patient will contact the office immediately.
Pain Refusal Text: I offered to prescribe pain medication but the patient refused to take this medication.
Mauc Instructions: By selecting yes to the question below the MAUC number will be added into the note.  This will be calculated automatically based on the diagnosis chosen, the size entered, the body zone selected (H,M,L) and the specific indications you chose. You will also have the option to override the Mohs AUC if you disagree with the automatically calculated number and this option is found in the Case Summary tab.
Where Do You Want The Question To Include Opioid Counseling Located?: Case Summary Tab
Eye Protection Verbiage: Before proceeding with the stage, a plastic scleral shield was inserted. The globe was anesthetized with a few drops of 1% lidocaine with 1:100,000 epinephrine. Then, an appropriate sized scleral shield was chosen and coated with lacrilube ointment. The shield was gently inserted and left in place for the duration of each stage. After the stage was completed, the shield was gently removed.
Mohs Method Verbiage: An incision at a 45 degree angle following the standard Mohs approach was done and the specimen was harvested as a microscopic controlled layer.
Surgeon/Pathologist Verbiage (Will Incorporate Name Of Surgeon From Intro If Not Blank): operated in two distinct and integrated capacities as the surgeon and pathologist.
Mohs Histo Method Verbiage: Each section was then chromacoded and processed in the Mohs lab using the Mohs protocol and submitted for frozen section.
Subsequent Stages Histo Method Verbiage: Using a similar technique to that described above, a thin layer of tissue was removed from all areas where tumor was visible on the previous stage.  The tissue was again oriented, mapped, dyed, and processed as above.
Mohs Rapid Report Verbiage: The area of clinically evident tumor was marked with skin marking ink and appropriately hatched.  The initial incision was made following the Mohs approach through the skin.  The specimen was taken to the lab, divided into the necessary number of pieces, chromacoded and processed according to the Mohs protocol.  This was repeated in successive stages until a tumor free defect was achieved.
Complex Repair Preamble Text (Leave Blank If You Do Not Want): Extensive wide undermining was performed at least 2 cm in all directions.
Intermediate Repair Preamble Text (Leave Blank If You Do Not Want): Undermining was performed with blunt dissection.
M-Plasty Complex Repair Preamble Text (Leave Blank If You Do Not Want): Extensive wide undermining was performed.
Non-Graft Cartilage Fenestration Text: The cartilage was fenestrated with a 2mm punch biopsy to help facilitate healing.
Graft Cartilage Fenestration Text: The cartilage was fenestrated with a 2mm punch biopsy to help facilitate graft survival and healing.
Secondary Intention Text (Leave Blank If You Do Not Want): The defect will heal with secondary intention.
No Repair - Repaired With Adjacent Surgical Defect Text (Leave Blank If You Do Not Want): After obtaining clear surgical margins the defect was repaired concurrently with another surgical defect which was in close approximation.
Unique Flap 1 Name: Myocutaneous Island pedicle Flap
Unique Flap 2 Name: Peng Flap
Unique Flap 3 Name: Mercedes Flap
Unique Flap 4 Name: Banner Flap
Unique Flap 5 Name: tunneled myocutaneous flap
Unique Flap 6 Name: Flory-B?ch flap
Unique Flap 7 Name: Mustarde flap
Unique Flap 8 Name: East to West Flap
Unique Flap 1 Text: A decision was made to reconstruct the defect utilizing a myocutaneous Island pedicle Flap based on the levator labii superioris muscle.  A telfa template was made of the defect.  This telfa template was then used to outline the myocutaneous flap, based along the meilolabial fold.  The donor area for the pedicle flap was then injected with anesthesia.  The flap was excised through the skin and subcutaneous tissue down to the layer of the underlying musculature.  The myocutaneous flap was carefully excised within this deep plane to maintain its blood supply. Based on the muscle. The edges of the donor site were undermined.   The donor site was closed in a primary fashion to the point of transposition.  The pedicle was then transposed into position and sutured.  Once the flap was sutured into place, adequate blood supply was confirmed with blanching and refill.
Unique Flap 2 Text: A decision was made to reconstruct the defect utilizing a Peng Flap (Bilateral Advancement Rotation Flap). Given the location of the defect and the proximity to free margins, this flap was deemed most appropriate.  Using a sterile surgical marker, the appropriate rotation flaps were drawn incorporating the defect and placing the expected incisions within the relaxed skin tension lines where possible.    The area thus outlined was incised deep to adipose tissue with a #15 scalpel blade.  The skin margins were undermined to an appropriate distance in all directions utilizing iris scissors.
Unique Flap 3 Text: The defect edges were debeveled with a #15 scalpel blade.  Given the location of the defect, shape of the defect and the proximity to free margins a Mercedes (double advancement flap) was deemed most appropriate.  Using a sterile surgical marker, the appropriate transposition flaps were drawn incorporating the defect and placing the expected incisions within the relaxed skin tension lines where possible.    The area thus outlined was incised deep to adipose tissue with a #15 scalpel blade.  The skin margins were undermined to an appropriate distance in all directions utilizing iris scissors.  Hemostasis was achieved with electrocautery.  The flaps were then advanced into the defect and anchored with interrupted buried subcutaneous sutures.
Unique Flap 4 Text: The defect edges were debeveled with a #15 scalpel blade.  Given the location of the defect and the proximity to free margins a Banner transposition flap was deemed most appropriate.  Using a sterile surgical marker, an appropriate Banner transposition flap was drawn incorporating the defect.    The area thus outlined was incised deep to adipose tissue with a #15 scalpel blade.  The skin margins were undermined to an appropriate distance in all directions utilizing iris scissors.
Unique Flap 5 Text: A decision was made to reconstruct the defect utilizing a tunneled myocutaneous Island pedicle Flap based on the anterior auricularis muscle.  A telfa template was made of the defect.  This telfa template was then used to outline the myocutaneous flap, based along the preauricular fold.  The donor area for the pedicle flap was then injected with anesthesia.  The flap was excised through the skin and subcutaneous tissue down to the layer of the underlying musculature.  The myocutaneous flap was carefully excised within this deep plane to maintain its blood supply based on the muscle. The edges of the donor site were undermined.   The donor site was closed in a primary fashion to the point of transposition.  The pedicle was then transposed through a tunnel into position and sutured.  Once the flap was sutured into place, adequate blood supply was confirmed with blanching and refill.
Unique Flap 6 Text: A decision was made to reconstruct the defect utilizing an Anti-aging-B?ch Flap (Bilateral helical Advancement Rotation Flap). Given the location of the defect and the proximity to free margins, this flap was deemed most appropriate.  Using a sterile surgical marker, the appropriate flaps were drawn incorporating the defect and placing the expected incisions within the relaxed skin tension lines where possible.  The area thus outlined was incised deep to adipose tissue with a #15 scalpel blade.  The skin margins were undermined to an appropriate distance in all directions utilizing iris scissors. Cartilage was incorporated into the flap arms to maintain helical anatomy.
Unique Flap 7 Text: A decision was made to reconstruct the defect utilizing a Mustarde Flap (Advancement Rotation Flap). Given the location of the defect and the proximity to free margins, this flap was deemed most appropriate.  Using a sterile surgical marker, the appropriate rotation flap was drawn incorporating the defect and placing the expected incisions within the relaxed skin tension lines where possible.    The area thus outlined was incised deep to adipose tissue with a #15 scalpel blade.  The skin margins were undermined to an appropriate distance in all directions utilizing iris scissors. The flap was advanced and rotated under the eyelid with a sling created laterally to keep ectropion minimal.
Unique Flap 8 Text: A decision was made to reconstruct the defect utilizing an East to West Flap (Modified Burows Advancement Flap). Given the location of the defect and the proximity to free margins, this flap was deemed most appropriate.  Using a sterile surgical marker, the appropriate advancement flaps were drawn incorporating the defect and placing the expected incisions within the relaxed skin tension lines where possible.    The area thus outlined was incised deep to adipose tissue with a #15 scalpel blade.  The skin margins were undermined to an appropriate distance in all directions utilizing iris scissors. Minimal alar distortion was created with flap approximation.
Adjacent Tissue Transfer Text: The defect edges were debeveled with a #15 scalpel blade.  Given the location of the defect and the proximity to free margins an adjacent tissue transfer was deemed most appropriate.  Using a sterile surgical marker, an appropriate flap was drawn incorporating the defect and placing the expected incisions within the relaxed skin tension lines where possible.    The area thus outlined was incised deep to adipose tissue with a #15 scalpel blade.  The skin margins were undermined to an appropriate distance in all directions utilizing iris scissors.
Advancement Flap (Single) Text: The defect edges were debeveled with a #15 scalpel blade.  Given the location of the defect and the proximity to free margins a single advancement flap was deemed most appropriate.  Using a sterile surgical marker, an appropriate advancement flap was drawn incorporating the defect and placing the expected incisions within the relaxed skin tension lines where possible.    The area thus outlined was incised deep to adipose tissue with a #15 scalpel blade.  The skin margins were undermined to an appropriate distance in all directions utilizing iris scissors.
Advancement Flap (Double) Text: The defect edges were debeveled with a #15 scalpel blade.  Given the location of the defect and the proximity to free margins a double advancement flap was deemed most appropriate.  Using a sterile surgical marker, the appropriate advancement flaps were drawn incorporating the defect and placing the expected incisions within the relaxed skin tension lines where possible.    The area thus outlined was incised deep to adipose tissue with a #15 scalpel blade.  The skin margins were undermined to an appropriate distance in all directions utilizing iris scissors.
Burow's Advancement Flap Text: The defect edges were debeveled with a #15 scalpel blade.  Given the location of the defect and the proximity to free margins a Burow's advancement flap was deemed most appropriate.  Using a sterile surgical marker, the appropriate advancement flap was drawn incorporating the defect and placing the expected incisions within the relaxed skin tension lines where possible.    The area thus outlined was incised deep to adipose tissue with a #15 scalpel blade.  The skin margins were undermined to an appropriate distance in all directions utilizing iris scissors.
Chonodrocutaneous Helical Advancement Flap Text: The defect edges were debeveled with a #15 scalpel blade.  Given the location of the defect and the proximity to free margins a chondrocutaneous helical advancement flap was deemed most appropriate.  Using a sterile surgical marker, the appropriate advancement flap was drawn incorporating the defect and placing the expected incisions within the relaxed skin tension lines where possible.    The area thus outlined was incised deep to adipose tissue with a #15 scalpel blade.  The skin margins were undermined to an appropriate distance in all directions utilizing iris scissors.
Crescentic Advancement Flap Text: The defect edges were debeveled with a #15 scalpel blade.  Given the location of the defect and the proximity to free margins a crescentic advancement flap was deemed most appropriate.  Using a sterile surgical marker, the appropriate advancement flap was drawn incorporating the defect and placing the expected incisions within the relaxed skin tension lines where possible.    The area thus outlined was incised deep to adipose tissue with a #15 scalpel blade.  The skin margins were undermined to an appropriate distance in all directions utilizing iris scissors.
A-T Advancement Flap Text: The defect edges were debeveled with a #15 scalpel blade.  Given the location of the defect, shape of the defect and the proximity to free margins an A-T advancement flap was deemed most appropriate.  Using a sterile surgical marker, an appropriate advancement flap was drawn incorporating the defect and placing the expected incisions within the relaxed skin tension lines where possible.    The area thus outlined was incised deep to adipose tissue with a #15 scalpel blade.  The skin margins were undermined to an appropriate distance in all directions utilizing iris scissors.
O-T Advancement Flap Text: The defect edges were debeveled with a #15 scalpel blade.  Given the location of the defect, shape of the defect and the proximity to free margins an O-T advancement flap was deemed most appropriate.  Using a sterile surgical marker, an appropriate advancement flap was drawn incorporating the defect and placing the expected incisions within the relaxed skin tension lines where possible.    The area thus outlined was incised deep to adipose tissue with a #15 scalpel blade.  The skin margins were undermined to an appropriate distance in all directions utilizing iris scissors.
O-L Flap Text: The defect edges were debeveled with a #15 scalpel blade.  Given the location of the defect, shape of the defect and the proximity to free margins an O-L flap was deemed most appropriate.  Using a sterile surgical marker, an appropriate advancement flap was drawn incorporating the defect and placing the expected incisions within the relaxed skin tension lines where possible.    The area thus outlined was incised deep to adipose tissue with a #15 scalpel blade.  The skin margins were undermined to an appropriate distance in all directions utilizing iris scissors.
O-Z Flap Text: The defect edges were debeveled with a #15 scalpel blade.  Given the location of the defect, shape of the defect and the proximity to free margins an O-Z flap was deemed most appropriate.  Using a sterile surgical marker, an appropriate transposition flap was drawn incorporating the defect and placing the expected incisions within the relaxed skin tension lines where possible. The area thus outlined was incised deep to adipose tissue with a #15 scalpel blade.  The skin margins were undermined to an appropriate distance in all directions utilizing iris scissors.
Double O-Z Flap Text: The defect edges were debeveled with a #15 scalpel blade.  Given the location of the defect, shape of the defect and the proximity to free margins a Double O-Z flap was deemed most appropriate.  Using a sterile surgical marker, an appropriate transposition flap was drawn incorporating the defect and placing the expected incisions within the relaxed skin tension lines where possible. The area thus outlined was incised deep to adipose tissue with a #15 scalpel blade.  The skin margins were undermined to an appropriate distance in all directions utilizing iris scissors.
V-Y Flap Text: The defect edges were debeveled with a #15 scalpel blade.  Given the location of the defect, shape of the defect and the proximity to free margins a V-Y flap was deemed most appropriate.  Using a sterile surgical marker, an appropriate advancement flap was drawn incorporating the defect and placing the expected incisions within the relaxed skin tension lines where possible.    The area thus outlined was incised deep to adipose tissue with a #15 scalpel blade.  The skin margins were undermined to an appropriate distance in all directions utilizing iris scissors.
Advancement-Rotation Flap Text: The defect edges were debeveled with a #15 scalpel blade.  Given the location of the defect, shape of the defect and the proximity to free margins an advancement-rotation flap was deemed most appropriate.  Using a sterile surgical marker, an appropriate flap was drawn incorporating the defect and placing the expected incisions within the relaxed skin tension lines where possible. The area thus outlined was incised deep to adipose tissue with a #15 scalpel blade.  The skin margins were undermined to an appropriate distance in all directions utilizing iris scissors.
Mercedes Flap Text: The defect edges were debeveled with a #15 scalpel blade.  Given the location of the defect, shape of the defect and the proximity to free margins a Mercedes flap was deemed most appropriate.  Using a sterile surgical marker, an appropriate advancement flap was drawn incorporating the defect and placing the expected incisions within the relaxed skin tension lines where possible. The area thus outlined was incised deep to adipose tissue with a #15 scalpel blade.  The skin margins were undermined to an appropriate distance in all directions utilizing iris scissors.
Modified Advancement Flap Text: The defect edges were debeveled with a #15 scalpel blade.  Given the location of the defect, shape of the defect and the proximity to free margins a modified advancement flap was deemed most appropriate.  Using a sterile surgical marker, an appropriate advancement flap was drawn incorporating the defect and placing the expected incisions within the relaxed skin tension lines where possible.    The area thus outlined was incised deep to adipose tissue with a #15 scalpel blade.  The skin margins were undermined to an appropriate distance in all directions utilizing iris scissors.
Mucosal Advancement Flap Text: Given the location of the defect, shape of the defect and the proximity to free margins a mucosal advancement flap was deemed most appropriate. Incisions were made with a 15 blade scalpel in the appropriate fashion along the cutaneous vermilion border and the mucosal lip. The remaining actinically damaged mucosal tissue was excised.  The mucosal advancement flap was then elevated to the gingival sulcus with care taken to preserve the neurovascular structures and advanced into the primary defect. Care was taken to ensure that precise realignment of the vermilion border was achieved.
Peng Advancement Flap Text: The defect edges were debeveled with a #15 scalpel blade.  Given the location of the defect, shape of the defect and the proximity to free margins a Peng advancement flap was deemed most appropriate.  Using a sterile surgical marker, an appropriate advancement flap was drawn incorporating the defect and placing the expected incisions within the relaxed skin tension lines where possible. The area thus outlined was incised deep to adipose tissue with a #15 scalpel blade.  The skin margins were undermined to an appropriate distance in all directions utilizing iris scissors.
Hatchet Flap Text: The defect edges were debeveled with a #15 scalpel blade.  Given the location of the defect, shape of the defect and the proximity to free margins a hatchet flap based from the glabella was deemed most appropriate.  Using a sterile surgical marker, an appropriate glabellar hatchet flap was drawn incorporating the defect and placing the expected incisions within the relaxed skin tension lines where possible.    The area thus outlined was incised deep to adipose tissue with a #15 scalpel blade.  The skin margins were undermined to an appropriate distance in all directions utilizing iris scissors.
Rotation Flap Text: The defect edges were debeveled with a #15 scalpel blade.  Given the location of the defect, shape of the defect and the proximity to free margins a rotation flap was deemed most appropriate.  Using a sterile surgical marker, an appropriate rotation flap was drawn incorporating the defect and placing the expected incisions within the relaxed skin tension lines where possible.    The area thus outlined was incised deep to adipose tissue with a #15 scalpel blade.  The skin margins were undermined to an appropriate distance in all directions utilizing iris scissors.
Bilateral Rotation Flap Text: The defect edges were debeveled with a #15 scalpel blade. Given the location of the defect, shape of the defect and the proximity to free margins a bilateral rotation flap was deemed most appropriate. Using a sterile surgical marker, an appropriate rotation flap was drawn incorporating the defect and placing the expected incisions within the relaxed skin tension lines where possible. The area thus outlined was incised deep to adipose tissue with a #15 scalpel blade. The skin margins were undermined to an appropriate distance in all directions utilizing iris scissors. Following this, the designed flap was carried over into the primary defect and sutured into place.
Spiral Flap Text: The defect edges were debeveled with a #15 scalpel blade.  Given the location of the defect, shape of the defect and the proximity to free margins a spiral flap was deemed most appropriate.  Using a sterile surgical marker, an appropriate rotation flap was drawn incorporating the defect and placing the expected incisions within the relaxed skin tension lines where possible. The area thus outlined was incised deep to adipose tissue with a #15 scalpel blade.  The skin margins were undermined to an appropriate distance in all directions utilizing iris scissors.
Staged Advancement Flap Text: The defect edges were debeveled with a #15 scalpel blade.  Given the location of the defect, shape of the defect and the proximity to free margins a staged advancement flap was deemed most appropriate.  Using a sterile surgical marker, an appropriate advancement flap was drawn incorporating the defect and placing the expected incisions within the relaxed skin tension lines where possible. The area thus outlined was incised deep to adipose tissue with a #15 scalpel blade.  The skin margins were undermined to an appropriate distance in all directions utilizing iris scissors.
Star Wedge Flap Text: The defect edges were debeveled with a #15 scalpel blade.  Given the location of the defect, shape of the defect and the proximity to free margins a star wedge flap was deemed most appropriate.  Using a sterile surgical marker, an appropriate rotation flap was drawn incorporating the defect and placing the expected incisions within the relaxed skin tension lines where possible. The area thus outlined was incised deep to adipose tissue with a #15 scalpel blade.  The skin margins were undermined to an appropriate distance in all directions utilizing iris scissors.
Transposition Flap Text: The defect edges were debeveled with a #15 scalpel blade.  Given the location of the defect and the proximity to free margins a transposition flap was deemed most appropriate.  Using a sterile surgical marker, an appropriate transposition flap was drawn incorporating the defect.    The area thus outlined was incised deep to adipose tissue with a #15 scalpel blade.  The skin margins were undermined to an appropriate distance in all directions utilizing iris scissors.
Muscle Hinge Flap Text: The defect edges were debeveled with a #15 scalpel blade.  Given the size, depth and location of the defect and the proximity to free margins a muscle hinge flap was deemed most appropriate.  Using a sterile surgical marker, an appropriate hinge flap was drawn incorporating the defect. The area thus outlined was incised with a #15 scalpel blade.  The skin margins were undermined to an appropriate distance in all directions utilizing iris scissors.
Mustarde Flap Text: The defect edges were debeveled with a #15 scalpel blade.  Given the size, depth and location of the defect and the proximity to free margins a Mustarde flap was deemed most appropriate.  Using a sterile surgical marker, an appropriate flap was drawn incorporating the defect. The area thus outlined was incised with a #15 scalpel blade.  The skin margins were undermined to an appropriate distance in all directions utilizing iris scissors.
Nasal Turnover Hinge Flap Text: The defect edges were debeveled with a #15 scalpel blade.  Given the size, depth, location of the defect and the defect being full thickness a nasal turnover hinge flap was deemed most appropriate.  Using a sterile surgical marker, an appropriate hinge flap was drawn incorporating the defect. The area thus outlined was incised with a #15 scalpel blade. The flap was designed to recreate the nasal mucosal lining and the alar rim. The skin margins were undermined to an appropriate distance in all directions utilizing iris scissors.
Nasalis-Muscle-Based Myocutaneous Island Pedicle Flap Text: Using a #15 blade, an incision was made around the donor flap to the level of the nasalis muscle. Wide lateral undermining was then performed in both the subcutaneous plane above the nasalis muscle, and in a submuscular plane just above periosteum. This allowed the formation of a free nasalis muscle axial pedicle (based on the angular artery) which was still attached to the actual cutaneous flap, increasing its mobility and vascular viability. Hemostasis was obtained with pinpoint electrocoagulation. The flap was mobilized into position and the pivotal anchor points positioned and stabilized with buried interrupted sutures. Subcutaneous and dermal tissues were closed in a multilayered fashion with sutures. Tissue redundancies were excised, and the epidermal edges were apposed without significant tension and sutured with sutures.
Orbicularis Oris Muscle Flap Text: The defect edges were debeveled with a #15 scalpel blade.  Given that the defect affected the competency of the oral sphincter an orbicularis oris muscle flap was deemed most appropriate to restore this competency and normal muscle function.  Using a sterile surgical marker, an appropriate flap was drawn incorporating the defect. The area thus outlined was incised with a #15 scalpel blade.
Melolabial Transposition Flap Text: The defect edges were debeveled with a #15 scalpel blade.  Given the location of the defect and the proximity to free margins a melolabial flap was deemed most appropriate.  Using a sterile surgical marker, an appropriate melolabial transposition flap was drawn incorporating the defect.    The area thus outlined was incised deep to adipose tissue with a #15 scalpel blade.  The skin margins were undermined to an appropriate distance in all directions utilizing iris scissors.
Rhombic Flap Text: The defect edges were debeveled with a #15 scalpel blade.  Given the location of the defect and the proximity to free margins a rhombic flap was deemed most appropriate.  Using a sterile surgical marker, an appropriate rhombic flap was drawn incorporating the defect.    The area thus outlined was incised deep to adipose tissue with a #15 scalpel blade.  The skin margins were undermined to an appropriate distance in all directions utilizing iris scissors.
Rhomboid Transposition Flap Text: The defect edges were debeveled with a #15 scalpel blade.  Given the location of the defect and the proximity to free margins a rhomboid transposition flap was deemed most appropriate.  Using a sterile surgical marker, an appropriate rhomboid flap was drawn incorporating the defect.    The area thus outlined was incised deep to adipose tissue with a #15 scalpel blade.  The skin margins were undermined to an appropriate distance in all directions utilizing iris scissors.
Bi-Rhombic Flap Text: The defect edges were debeveled with a #15 scalpel blade.  Given the location of the defect and the proximity to free margins a bi-rhombic flap was deemed most appropriate.  Using a sterile surgical marker, an appropriate rhombic flap was drawn incorporating the defect. The area thus outlined was incised deep to adipose tissue with a #15 scalpel blade.  The skin margins were undermined to an appropriate distance in all directions utilizing iris scissors.
Helical Rim Advancement Flap Text: The defect edges were debeveled with a #15 blade scalpel.  Given the location of the defect and the proximity to free margins (helical rim) a double helical rim advancement flap was deemed most appropriate.  Using a sterile surgical marker, the appropriate advancement flaps were drawn incorporating the defect and placing the expected incisions between the helical rim and antihelix where possible.  The area thus outlined was incised through and through with a #15 scalpel blade.  With a skin hook and iris scissors, the flaps were gently and sharply undermined and freed up.
Bilateral Helical Rim Advancement Flap Text: The defect edges were debeveled with a #15 blade scalpel.  Given the location of the defect and the proximity to free margins (helical rim) a bilateral helical rim advancement flap was deemed most appropriate.  Using a sterile surgical marker, the appropriate advancement flaps were drawn incorporating the defect and placing the expected incisions between the helical rim and antihelix where possible.  The area thus outlined was incised through and through with a #15 scalpel blade.  With a skin hook and iris scissors, the flaps were gently and sharply undermined and freed up.
Ear Star Wedge Flap Text: The defect edges were debeveled with a #15 blade scalpel.  Given the location of the defect and the proximity to free margins (helical rim) an ear star wedge flap was deemed most appropriate.  Using a sterile surgical marker, the appropriate flap was drawn incorporating the defect and placing the expected incisions between the helical rim and antihelix where possible.  The area thus outlined was incised through and through with a #15 scalpel blade.
Banner Transposition Flap Text: The defect edges were debeveled with a #15 scalpel blade.  Given the location of the defect and the proximity to free margins a Banner transposition flap was deemed most appropriate.  Using a sterile surgical marker, an appropriate flap drawn around the defect. The area thus outlined was incised deep to adipose tissue with a #15 scalpel blade.  The skin margins were undermined to an appropriate distance in all directions utilizing iris scissors.
Bilobed Flap Text: The defect edges were debeveled with a #15 scalpel blade.  Given the location of the defect and the proximity to free margins a bilobe flap was deemed most appropriate.  Using a sterile surgical marker, an appropriate bilobe flap drawn around the defect.    The area thus outlined was incised deep to adipose tissue with a #15 scalpel blade.  The skin margins were undermined to an appropriate distance in all directions utilizing iris scissors.
Bilobed Transposition Flap Text: The defect edges were debeveled with a #15 scalpel blade.  Given the location of the defect and the proximity to free margins a bilobed transposition flap was deemed most appropriate.  Using a sterile surgical marker, an appropriate bilobe flap drawn around the defect.    The area thus outlined was incised deep to adipose tissue with a #15 scalpel blade.  The skin margins were undermined to an appropriate distance in all directions utilizing iris scissors.
Trilobed Flap Text: The defect edges were debeveled with a #15 scalpel blade.  Given the location of the defect and the proximity to free margins a trilobed flap was deemed most appropriate.  Using a sterile surgical marker, an appropriate trilobed flap drawn around the defect.    The area thus outlined was incised deep to adipose tissue with a #15 scalpel blade.  The skin margins were undermined to an appropriate distance in all directions utilizing iris scissors.
Dorsal Nasal Flap Text: The defect edges were debeveled with a #15 scalpel blade.  Given the location of the defect and the proximity to free margins a dorsal nasal flap,based upon the glabellar folds, was deemed most appropriate.  Using a sterile surgical marker, an appropriate dorsal nasal flap was drawn around the defect.    The area thus outlined was incised deep to adipose tissue with a #15 scalpel blade.  The skin margins were undermined to an appropriate distance in all directions utilizing iris scissors.
Island Pedicle Flap Text: The defect edges were debeveled with a #15 scalpel blade.  Given the location of the defect, shape of the defect and the proximity to free margins an island pedicle advancement flap was deemed most appropriate.  Using a sterile surgical marker, an appropriate advancement flap was drawn incorporating the defect, outlining the appropriate donor tissue and placing the expected incisions within the relaxed skin tension lines where possible.    The area thus outlined was incised deep to adipose tissue with a #15 scalpel blade.  The skin margins were undermined to an appropriate distance in all directions around the primary defect and laterally outward around the island pedicle utilizing iris scissors.  There was minimal undermining beneath the pedicle flap.
Island Pedicle Flap With Canthal Suspension Text: The defect edges were debeveled with a #15 scalpel blade.  Given the location of the defect, shape of the defect and the proximity to free margins an island pedicle advancement flap was deemed most appropriate.  Using a sterile surgical marker, an appropriate advancement flap was drawn incorporating the defect, outlining the appropriate donor tissue and placing the expected incisions within the relaxed skin tension lines where possible. The area thus outlined was incised deep to adipose tissue with a #15 scalpel blade.  The skin margins were undermined to an appropriate distance in all directions around the primary defect and laterally outward around the island pedicle utilizing iris scissors.  There was minimal undermining beneath the pedicle flap. A suspension suture was placed in the canthal tendon to prevent tension and prevent ectropion.
Alar Island Pedicle Flap Text: The defect edges were debeveled with a #15 scalpel blade.  Given the location of the defect, shape of the defect and the proximity to the alar rim an island pedicle advancement flap was deemed most appropriate.  Using a sterile surgical marker, an appropriate advancement flap was drawn incorporating the defect, outlining the appropriate donor tissue and placing the expected incisions within the nasal ala running parallel to the alar rim. The area thus outlined was incised with a #15 scalpel blade.  The skin margins were undermined minimally to an appropriate distance in all directions around the primary defect and laterally outward around the island pedicle utilizing iris scissors.  There was minimal undermining beneath the pedicle flap.
Double Island Pedicle Flap Text: The defect edges were debeveled with a #15 scalpel blade.  Given the location of the defect, shape of the defect and the proximity to free margins a double island pedicle advancement flap was deemed most appropriate.  Using a sterile surgical marker, an appropriate advancement flap was drawn incorporating the defect, outlining the appropriate donor tissue and placing the expected incisions within the relaxed skin tension lines where possible.    The area thus outlined was incised deep to adipose tissue with a #15 scalpel blade.  The skin margins were undermined to an appropriate distance in all directions around the primary defect and laterally outward around the island pedicle utilizing iris scissors.  There was minimal undermining beneath the pedicle flap.
Island Pedicle Flap-Requiring Vessel Identification Text: The defect edges were debeveled with a #15 scalpel blade.  Given the location of the defect, shape of the defect and the proximity to free margins an island pedicle advancement flap was deemed most appropriate.  Using a sterile surgical marker, an appropriate advancement flap was drawn, based on the axial vessel mentioned above, incorporating the defect, outlining the appropriate donor tissue and placing the expected incisions within the relaxed skin tension lines where possible.    The area thus outlined was incised deep to adipose tissue with a #15 scalpel blade.  The skin margins were undermined to an appropriate distance in all directions around the primary defect and laterally outward around the island pedicle utilizing iris scissors.  There was minimal undermining beneath the pedicle flap.
Keystone Flap Text: The defect edges were debeveled with a #15 scalpel blade.  Given the location of the defect, shape of the defect a keystone flap was deemed most appropriate.  Using a sterile surgical marker, an appropriate keystone flap was drawn incorporating the defect, outlining the appropriate donor tissue and placing the expected incisions within the relaxed skin tension lines where possible. The area thus outlined was incised deep to adipose tissue with a #15 scalpel blade.  The skin margins were undermined to an appropriate distance in all directions around the primary defect and laterally outward around the flap utilizing iris scissors.
O-T Plasty Text: The defect edges were debeveled with a #15 scalpel blade.  Given the location of the defect, shape of the defect and the proximity to free margins an O-T plasty was deemed most appropriate.  Using a sterile surgical marker, an appropriate O-T plasty was drawn incorporating the defect and placing the expected incisions within the relaxed skin tension lines where possible.    The area thus outlined was incised deep to adipose tissue with a #15 scalpel blade.  The skin margins were undermined to an appropriate distance in all directions utilizing iris scissors.
O-Z Plasty Text: The defect edges were debeveled with a #15 scalpel blade.  Given the location of the defect, shape of the defect and the proximity to free margins an O-Z plasty (double transposition flap) was deemed most appropriate.  Using a sterile surgical marker, the appropriate transposition flaps were drawn incorporating the defect and placing the expected incisions within the relaxed skin tension lines where possible.    The area thus outlined was incised deep to adipose tissue with a #15 scalpel blade.  The skin margins were undermined to an appropriate distance in all directions utilizing iris scissors.  Hemostasis was achieved with electrocautery.  The flaps were then transposed into place, one clockwise and the other counterclockwise, and anchored with interrupted buried subcutaneous sutures.
Double O-Z Plasty Text: The defect edges were debeveled with a #15 scalpel blade.  Given the location of the defect, shape of the defect and the proximity to free margins a Double O-Z plasty (double transposition flap) was deemed most appropriate.  Using a sterile surgical marker, the appropriate transposition flaps were drawn incorporating the defect and placing the expected incisions within the relaxed skin tension lines where possible. The area thus outlined was incised deep to adipose tissue with a #15 scalpel blade.  The skin margins were undermined to an appropriate distance in all directions utilizing iris scissors.  Hemostasis was achieved with electrocautery.  The flaps were then transposed into place, one clockwise and the other counterclockwise, and anchored with interrupted buried subcutaneous sutures.
V-Y Plasty Text: The defect edges were debeveled with a #15 scalpel blade.  Given the location of the defect, shape of the defect and the proximity to free margins an V-Y advancement flap was deemed most appropriate.  Using a sterile surgical marker, an appropriate advancement flap was drawn incorporating the defect and placing the expected incisions within the relaxed skin tension lines where possible.    The area thus outlined was incised deep to adipose tissue with a #15 scalpel blade.  The skin margins were undermined to an appropriate distance in all directions utilizing iris scissors.
H Plasty Text: Given the location of the defect, shape of the defect and the proximity to free margins a H-plasty was deemed most appropriate for repair.  Using a sterile surgical marker, the appropriate advancement arms of the H-plasty were drawn incorporating the defect and placing the expected incisions within the relaxed skin tension lines where possible. The area thus outlined was incised deep to adipose tissue with a #15 scalpel blade. The skin margins were undermined to an appropriate distance in all directions utilizing iris scissors.  The opposing advancement arms were then advanced into place in opposite direction and anchored with interrupted buried subcutaneous sutures.
W Plasty Text: The lesion was extirpated to the level of the fat with a #15 scalpel blade.  Given the location of the defect, shape of the defect and the proximity to free margins a W-plasty was deemed most appropriate for repair.  Using a sterile surgical marker, the appropriate transposition arms of the W-plasty were drawn incorporating the defect and placing the expected incisions within the relaxed skin tension lines where possible.    The area thus outlined was incised deep to adipose tissue with a #15 scalpel blade.  The skin margins were undermined to an appropriate distance in all directions utilizing iris scissors.  The opposing transposition arms were then transposed into place in opposite direction and anchored with interrupted buried subcutaneous sutures.
Z Plasty Text: The lesion was extirpated to the level of the fat with a #15 scalpel blade.  Given the location of the defect, shape of the defect and the proximity to free margins a Z-plasty was deemed most appropriate for repair.  Using a sterile surgical marker, the appropriate transposition arms of the Z-plasty were drawn incorporating the defect and placing the expected incisions within the relaxed skin tension lines where possible.    The area thus outlined was incised deep to adipose tissue with a #15 scalpel blade.  The skin margins were undermined to an appropriate distance in all directions utilizing iris scissors.  The opposing transposition arms were then transposed into place in opposite direction and anchored with interrupted buried subcutaneous sutures.
Double Z Plasty Text: The lesion was extirpated to the level of the fat with a #15 scalpel blade. Given the location of the defect, shape of the defect and the proximity to free margins a double Z-plasty was deemed most appropriate for repair. Using a sterile surgical marker, the appropriate transposition arms of the double Z-plasty were drawn incorporating the defect and placing the expected incisions within the relaxed skin tension lines where possible. The area thus outlined was incised deep to adipose tissue with a #15 scalpel blade. The skin margins were undermined to an appropriate distance in all directions utilizing iris scissors. The opposing transposition arms were then transposed and carried over into place in opposite direction and anchored with interrupted buried subcutaneous sutures.
Zygomaticofacial Flap Text: Given the location of the defect, shape of the defect and the proximity to free margins a zygomaticofacial flap was deemed most appropriate for repair.  Using a sterile surgical marker, the appropriate flap was drawn incorporating the defect and placing the expected incisions within the relaxed skin tension lines where possible. The area thus outlined was incised deep to adipose tissue with a #15 scalpel blade with preservation of a vascular pedicle.  The skin margins were undermined to an appropriate distance in all directions utilizing iris scissors.  The flap was then placed into the defect and anchored with interrupted buried subcutaneous sutures.
Cheek Interpolation Flap Text: A decision was made to reconstruct the defect utilizing an interpolation axial flap and a staged reconstruction.  A telfa template was made of the defect.  This telfa template was then used to outline the Cheek Interpolation flap.  The donor area for the pedicle flap was then injected with anesthesia.  The flap was excised through the skin and subcutaneous tissue down to the layer of the underlying musculature.  The interpolation flap was carefully excised within this deep plane to maintain its blood supply.  The edges of the donor site were undermined.   The donor site was closed in a primary fashion.  The pedicle was then rotated into position and sutured.  Once the tube was sutured into place, adequate blood supply was confirmed with blanching and refill.  The pedicle was then wrapped with xeroform gauze and dressed appropriately with a telfa and gauze bandage to ensure continued blood supply and protect the attached pedicle.
Cheek-To-Nose Interpolation Flap Text: A decision was made to reconstruct the defect utilizing an interpolation axial flap and a staged reconstruction.  A telfa template was made of the defect.  This telfa template was then used to outline the Cheek-To-Nose Interpolation flap.  The donor area for the pedicle flap was then injected with anesthesia.  The flap was excised through the skin and subcutaneous tissue down to the layer of the underlying musculature.  The interpolation flap was carefully excised within this deep plane to maintain its blood supply.  The edges of the donor site were undermined.   The donor site was closed in a primary fashion.  The pedicle was then rotated into position and sutured.  Once the tube was sutured into place, adequate blood supply was confirmed with blanching and refill.  The pedicle was then wrapped with xeroform gauze and dressed appropriately with a telfa and gauze bandage to ensure continued blood supply and protect the attached pedicle.
Interpolation Flap Text: A decision was made to reconstruct the defect utilizing an interpolation axial flap and a staged reconstruction.  A telfa template was made of the defect.  This telfa template was then used to outline the interpolation flap.  The donor area for the pedicle flap was then injected with anesthesia.  The flap was excised through the skin and subcutaneous tissue down to the layer of the underlying musculature.  The interpolation flap was carefully excised within this deep plane to maintain its blood supply.  The edges of the donor site were undermined.   The donor site was closed in a primary fashion.  The pedicle was then rotated into position and sutured.  Once the tube was sutured into place, adequate blood supply was confirmed with blanching and refill.  The pedicle was then wrapped with xeroform gauze and dressed appropriately with a telfa and gauze bandage to ensure continued blood supply and protect the attached pedicle.
Melolabial Interpolation Flap Text: A decision was made to reconstruct the defect utilizing an interpolation axial flap and a staged reconstruction.  A telfa template was made of the defect.  This telfa template was then used to outline the melolabial interpolation flap.  The donor area for the pedicle flap was then injected with anesthesia.  The flap was excised through the skin and subcutaneous tissue down to the layer of the underlying musculature.  The pedicle flap was carefully excised within this deep plane to maintain its blood supply.  The edges of the donor site were undermined.   The donor site was closed in a primary fashion.  The pedicle was then rotated into position and sutured.  Once the tube was sutured into place, adequate blood supply was confirmed with blanching and refill.  The pedicle was then wrapped with xeroform gauze and dressed appropriately with a telfa and gauze bandage to ensure continued blood supply and protect the attached pedicle.
Mastoid Interpolation Flap Text: A decision was made to reconstruct the defect utilizing an interpolation axial flap and a staged reconstruction.  A telfa template was made of the defect.  This telfa template was then used to outline the mastoid interpolation flap.  The donor area for the pedicle flap was then injected with anesthesia.  The flap was excised through the skin and subcutaneous tissue down to the layer of the underlying musculature.  The pedicle flap was carefully excised within this deep plane to maintain its blood supply.  The edges of the donor site were undermined.   The donor site was closed in a primary fashion.  The pedicle was then rotated into position and sutured.  Once the tube was sutured into place, adequate blood supply was confirmed with blanching and refill.  The pedicle was then wrapped with xeroform gauze and dressed appropriately with a telfa and gauze bandage to ensure continued blood supply and protect the attached pedicle.
Posterior Auricular Interpolation Flap Text: A decision was made to reconstruct the defect utilizing an interpolation axial flap and a staged reconstruction.  A telfa template was made of the defect.  This telfa template was then used to outline the posterior auricular interpolation flap.  The donor area for the pedicle flap was then injected with anesthesia.  The flap was excised through the skin and subcutaneous tissue down to the layer of the underlying musculature.  The pedicle flap was carefully excised within this deep plane to maintain its blood supply.  The edges of the donor site were undermined.   The donor site was closed in a primary fashion.  The pedicle was then rotated into position and sutured.  Once the tube was sutured into place, adequate blood supply was confirmed with blanching and refill.  The pedicle was then wrapped with xeroform gauze and dressed appropriately with a telfa and gauze bandage to ensure continued blood supply and protect the attached pedicle.
Paramedian Forehead Flap Text: A decision was made to reconstruct the defect utilizing an interpolation axial flap and a staged reconstruction.  A telfa template was made of the defect.  This telfa template was then used to outline the paramedian forehead pedicle flap.  The donor area for the pedicle flap was then injected with anesthesia.  The flap was excised through the skin and subcutaneous tissue down to the layer of the underlying musculature.  The pedicle flap was carefully excised within this deep plane to maintain its blood supply.  The edges of the donor site were undermined.   The donor site was closed in a primary fashion.  The pedicle was then rotated into position and sutured.  Once the tube was sutured into place, adequate blood supply was confirmed with blanching and refill.  The pedicle was then wrapped with xeroform gauze and dressed appropriately with a telfa and gauze bandage to ensure continued blood supply and protect the attached pedicle.
Abbe Flap (Upper To Lower Lip) Text: The defect of the lower lip was assessed and measured.  Given the location and size of the defect, an Abbe flap was deemed most appropriate.  Using a sterile surgical marker, an appropriate Abbe flap was measured and drawn on the upper lip. Local anesthesia was then infiltrated.  A scalpel was then used to incise the upper lip through and through the skin, vermilion, muscle and mucosa, leaving the flap pedicled on the opposite side.  The flap was then rotated and transferred to the lower lip defect.  The flap was then sutured into place with a three layer technique, closing the orbicularis oris muscle layer with subcutaneous buried sutures, followed by a mucosal layer and an epidermal layer.
Abbe Flap (Lower To Upper Lip) Text: The defect of the upper lip was assessed and measured.  Given the location and size of the defect, an Abbe flap was deemed most appropriate.  Using a sterile surgical marker, an appropriate Abbe flap was measured and drawn on the lower lip. Local anesthesia was then infiltrated. A scalpel was then used to incise the upper lip through and through the skin, vermilion, muscle and mucosa, leaving the flap pedicled on the opposite side.  The flap was then rotated and transferred to the lower lip defect.  The flap was then sutured into place with a three layer technique, closing the orbicularis oris muscle layer with subcutaneous buried sutures, followed by a mucosal layer and an epidermal layer.
Estlander Flap (Upper To Lower Lip) Text: The defect of the lower lip was assessed and measured.  Given the location and size of the defect, an Estlander flap was deemed most appropriate.  Using a sterile surgical marker, an appropriate Estlander flap was measured and drawn on the upper lip. Local anesthesia was then infiltrated. A scalpel was then used to incise the lateral aspect of the flap, through skin, muscle and mucosa, leaving the flap pedicled medially.  The flap was then rotated and positioned to fill the lower lip defect.  The flap was then sutured into place with a three layer technique, closing the orbicularis oris muscle layer with subcutaneous buried sutures, followed by a mucosal layer and an epidermal layer.
Cheiloplasty (Less Than 50%) Text: A decision was made to reconstruct the defect with a  cheiloplasty.  The defect was undermined extensively.  Additional obicularis oris muscle was excised with a 15 blade scalpel.  The defect was converted into a full thickness wedge, of less than 50% of the vertical height of the lip, to facilite a better cosmetic result.  Small vessels were then tied off with 5-0 monocyrl. The obicularis oris, superficial fascia, adipose and dermis were then reapproximated.  After the deeper layers were approximated the epidermis was reapproximated with particular care given to realign the vermilion border.
Cheiloplasty (Complex) Text: A decision was made to reconstruct the defect with a  cheiloplasty.  The defect was undermined extensively.  Additional obicularis oris muscle was excised with a 15 blade scalpel.  The defect was converted into a full thickness wedge to facilite a better cosmetic result.  Small vessels were then tied off with 5-0 monocyrl. The obicularis oris, superficial fascia, adipose and dermis were then reapproximated.  After the deeper layers were approximated the epidermis was reapproximated with particular care given to realign the vermilion border.
Ear Wedge Repair Text: A wedge excision was completed by carrying down an excision through the full thickness of the ear and cartilage with an inward facing Burow's triangle. The wound was then closed in a layered fashion.
Full Thickness Lip Wedge Repair (Flap) Text: Given the location of the defect and the proximity to free margins a full thickness wedge repair was deemed most appropriate.  Using a sterile surgical marker, the appropriate repair was drawn incorporating the defect and placing the expected incisions perpendicular to the vermilion border.  The vermilion border was also meticulously outlined to ensure appropriate reapproximation during the repair.  The area thus outlined was incised through and through with a #15 scalpel blade.  The muscularis and dermis were reaproximated with deep sutures following hemostasis. Care was taken to realign the vermilion border before proceeding with the superficial closure.  Once the vermilion was realigned the superfical and mucosal closure was finished.
Ftsg Text: The defect edges were debeveled with a #15 scalpel blade.  Given the location of the defect, shape of the defect and the proximity to free margins a full thickness skin graft was deemed most appropriate.  Using a sterile surgical marker, the primary defect shape was transferred to the donor site. The area thus outlined was incised deep to adipose tissue with a #15 scalpel blade.  The harvested graft was then trimmed of adipose tissue until only dermis and epidermis was left.  The skin margins of the secondary defect were undermined to an appropriate distance in all directions utilizing iris scissors.  The secondary defect was closed with interrupted buried subcutaneous sutures.  The skin edges were then re-apposed with running  sutures.  The skin graft was then placed in the primary defect and oriented appropriately.
Split-Thickness Skin Graft Text: The defect edges were debeveled with a #15 scalpel blade.  Given the location of the defect, shape of the defect and the proximity to free margins a split thickness skin graft was deemed most appropriate.  Using a sterile surgical marker, the primary defect shape was transferred to the donor site. The split thickness graft was then harvested.  The skin graft was then placed in the primary defect and oriented appropriately.
Pinch Graft Text: The defect edges were debeveled with a #15 scalpel blade. Given the location of the defect, shape of the defect and the proximity to free margins a pinch graft was deemed most appropriate. Using a sterile surgical marker, the primary defect shape was transferred to the donor site. The area thus outlined was incised deep to adipose tissue with a #15 scalpel blade.  The harvested graft was then trimmed of adipose tissue until only dermis and epidermis was left. The skin margins of the secondary defect were undermined to an appropriate distance in all directions utilizing iris scissors.  The secondary defect was closed with interrupted buried subcutaneous sutures.  The skin edges were then re-apposed with running  sutures.  The skin graft was then placed in the primary defect and oriented appropriately.
Burow's Graft Text: The defect edges were debeveled with a #15 scalpel blade.  Given the location of the defect, shape of the defect, the proximity to free margins and the presence of a standing cone deformity a Burow's skin graft was deemed most appropriate. The standing cone was removed and this tissue was then trimmed to the shape of the primary defect. The adipose tissue was also removed until only dermis and epidermis were left.  The skin margins of the secondary defect were undermined to an appropriate distance in all directions utilizing iris scissors.  The secondary defect was closed with interrupted buried subcutaneous sutures.  The skin edges were then re-apposed with running  sutures.  The skin graft was then placed in the primary defect and oriented appropriately.
Cartilage Graft Text: The defect edges were debeveled with a #15 scalpel blade.  Given the location of the defect, shape of the defect, the fact the defect involved a full thickness cartilage defect a cartilage graft was deemed most appropriate.  An appropriate donor site was identified, cleansed, and anesthetized. The cartilage graft was then harvested and transferred to the recipient site, oriented appropriately and then sutured into place.  The secondary defect was then repaired using a primary closure.
Composite Graft Text: The defect edges were debeveled with a #15 scalpel blade.  Given the location of the defect, shape of the defect, the proximity to free margins and the fact the defect was full thickness a composite graft was deemed most appropriate.  The defect was outline and then transferred to the donor site.  A full thickness graft was then excised from the donor site. The graft was then placed in the primary defect, oriented appropriately and then sutured into place.  The secondary defect was then repaired using a primary closure.
Epidermal Autograft Text: The defect edges were debeveled with a #15 scalpel blade.  Given the location of the defect, shape of the defect and the proximity to free margins an epidermal autograft was deemed most appropriate.  Using a sterile surgical marker, the primary defect shape was transferred to the donor site. The epidermal graft was then harvested.  The skin graft was then placed in the primary defect and oriented appropriately.
Dermal Autograft Text: The defect edges were debeveled with a #15 scalpel blade.  Given the location of the defect, shape of the defect and the proximity to free margins a dermal autograft was deemed most appropriate.  Using a sterile surgical marker, the primary defect shape was transferred to the donor site. The area thus outlined was incised deep to adipose tissue with a #15 scalpel blade.  The harvested graft was then trimmed of adipose and epidermal tissue until only dermis was left.  The skin graft was then placed in the primary defect and oriented appropriately.
Skin Substitute Text: The defect edges were debeveled with a #15 scalpel blade.  Given the location of the defect, shape of the defect and the proximity to free margins a skin substitute graft was deemed most appropriate.  The graft material was trimmed to fit the size of the defect. The graft was then placed in the primary defect and oriented appropriately.
Tissue Cultured Epidermal Autograft Text: The defect edges were debeveled with a #15 scalpel blade.  Given the location of the defect, shape of the defect and the proximity to free margins a tissue cultured epidermal autograft was deemed most appropriate.  The graft was then trimmed to fit the size of the defect.  The graft was then placed in the primary defect and oriented appropriately.
Xenograft Text: The defect edges were debeveled with a #15 scalpel blade.  Given the location of the defect, shape of the defect and the proximity to free margins a xenograft was deemed most appropriate.  The graft was then trimmed to fit the size of the defect.  The graft was then placed in the primary defect and oriented appropriately.
Purse String (Simple) Text: Given the location of the defect and the characteristics of the surrounding skin a purse string closure was deemed most appropriate.  Undermining was performed circumfirentially around the surgical defect.  A purse string suture was then placed and tightened.
Purse String (Intermediate) Text: Given the location of the defect and the characteristics of the surrounding skin a purse string intermediate closure was deemed most appropriate.  Undermining was performed circumfirentially around the surgical defect.  A purse string suture was then placed and tightened.
Partial Purse String (Simple) Text: Given the location of the defect and the characteristics of the surrounding skin a simple purse string closure was deemed most appropriate.  Undermining was performed circumfirentially around the surgical defect.  A purse string suture was then placed and tightened. Wound tension only allowed a partial closure of the circular defect.
Partial Purse String (Intermediate) Text: Given the location of the defect and the characteristics of the surrounding skin an intermediate purse string closure was deemed most appropriate.  Undermining was performed circumfirentially around the surgical defect.  A purse string suture was then placed and tightened. Wound tension only allowed a partial closure of the circular defect.
Localized Dermabrasion With Wire Brush Text: The patient was draped in routine manner.  Localized dermabrasion using 3 x 17 mm wire brush was performed in routine manner to papillary dermis. This spot dermabrasion is being performed to complete skin cancer reconstruction. It also will eliminate the other sun damaged precancerous cells that are known to be part of the regional effect of a lifetime's worth of sun exposure. This localized dermabrasion is therapeutic and should not be considered cosmetic in any regard.
Tarsorrhaphy Text: A tarsorrhaphy was performed using Frost sutures.
Intermediate Repair And Flap Additional Text (Will Appearing After The Standard Complex Repair Text): The intermediate repair was not sufficient to completely close the primary defect. The remaining additional defect was repaired with the flap mentioned below.
Intermediate Repair And Graft Additional Text (Will Appearing After The Standard Complex Repair Text): The intermediate repair was not sufficient to completely close the primary defect. The remaining additional defect was repaired with the graft mentioned below.
Complex Repair And Flap Additional Text (Will Appearing After The Standard Complex Repair Text): The complex repair was not sufficient to completely close the primary defect. The remaining additional defect was repaired with the flap mentioned below.
Complex Repair And Graft Additional Text (Will Appearing After The Standard Complex Repair Text): The complex repair was not sufficient to completely close the primary defect. The remaining additional defect was repaired with the graft mentioned below.
Manual Repair Warning Statement: We plan on removing the manually selected variable below in favor of our much easier automatic structured text blocks found in the previous tab. We decided to do this to help make the flow better and give you the full power of structured data. Manual selection is never going to be ideal in our platform and I would encourage you to avoid using manual selection from this point on, especially since I will be sunsetting this feature. It is important that you do one of two things with the customized text below. First, you can save all of the text in a word file so you can have it for future reference. Second, transfer the text to the appropriate area in the Library tab. Lastly, if there is a flap or graft type which we do not have you need to let us know right away so I can add it in before the variable is hidden. No need to panic, we plan to give you roughly 6 months to make the change.
Same Histology In Subsequent Stages Text: The pattern and morphology of the tumor is as described in the first stage.
No Residual Tumor Seen Histology Text: There were no malignant cells seen in the sections examined.
Inflammation Suggestive Of Cancer Camouflage Histology Text: There was a dense lymphocytic infiltrate which prevented adequate histologic evaluation of adjacent structures.
Bcc Histology Text: There were numerous aggregates of basaloid cells.
Bcc Infiltrative Histology Text: There were numerous aggregates of basaloid cells demonstrating an infiltrative pattern.
Mart-1 - Positive Histology Text: MART-1 staining demonstrates areas of higher density and clustering of melanocytes with Pagetoid spread upwards within the epidermis. The surgical margins are positive for tumor cells.
Mart-1 - Negative Histology Text: MART-1 staining demonstrates a normal density and pattern of melanocytes along the dermal-epidermal junction. The surgical margins are negative for tumor cells.
Information: Selecting Yes will display possible errors in your note based on the variables you have selected. This validation is only offered as a suggestion for you. PLEASE NOTE THAT THE VALIDATION TEXT WILL BE REMOVED WHEN YOU FINALIZE YOUR NOTE. IF YOU WANT TO FAX A PRELIMINARY NOTE YOU WILL NEED TO TOGGLE THIS TO 'NO' IF YOU DO NOT WANT IT IN YOUR FAXED NOTE.

## 2023-08-10 ENCOUNTER — RX ONLY (OUTPATIENT)
Age: 58
Setting detail: RX ONLY
End: 2023-08-10

## 2023-08-10 RX ORDER — HYDROCODONE BITARTRATE AND ACETAMINOPHEN 5; 325 MG/1; MG/1
TABLET ORAL
Qty: 20 | Refills: 0 | Status: ERX | COMMUNITY
Start: 2023-08-10

## 2023-08-21 DIAGNOSIS — I10 ESSENTIAL HYPERTENSION: ICD-10-CM

## 2023-08-21 NOTE — TELEPHONE ENCOUNTER
Was the patient seen in the last year in this department? Yes    Does patient have an active prescription for medications requested? Yes    Received Request Via: Pharmacy    No visits with results within 1 Year(s) from this visit.   Latest known visit with results is:   Hospital Outpatient Visit on 02/09/2022   Component Date Value    Prostatic Specific Antig* 02/09/2022 1.59     25-Hydroxy   Vitamin D 25 02/09/2022 92     Cholesterol,Tot 02/09/2022 153     Triglycerides 02/09/2022 62     HDL 02/09/2022 63     LDL 02/09/2022 78     TSH 02/09/2022 2.830     Free T-4 02/09/2022 0.99     Sodium 02/09/2022 132 (L)     Potassium 02/09/2022 4.5     Chloride 02/09/2022 98     Co2 02/09/2022 20     Anion Gap 02/09/2022 14.0     Glucose 02/09/2022 95     Bun 02/09/2022 10     Creatinine 02/09/2022 0.96     Calcium 02/09/2022 9.3     AST(SGOT) 02/09/2022 19     ALT(SGPT) 02/09/2022 10     Alkaline Phosphatase 02/09/2022 70     Total Bilirubin 02/09/2022 0.2     Albumin 02/09/2022 4.2     Total Protein 02/09/2022 7.1     Globulin 02/09/2022 2.9     A-G Ratio 02/09/2022 1.4     WBC 02/09/2022 7.4     RBC 02/09/2022 3.89 (L)     Hemoglobin 02/09/2022 12.8 (L)     Hematocrit 02/09/2022 36.6 (L)     MCV 02/09/2022 94.1     MCH 02/09/2022 32.9     MCHC 02/09/2022 35.0     RDW 02/09/2022 47.8     Platelet Count 02/09/2022 194     MPV 02/09/2022 11.3     Neutrophils-Polys 02/09/2022 62.10     Lymphocytes 02/09/2022 26.40     Monocytes 02/09/2022 7.10     Eosinophils 02/09/2022 2.40     Basophils 02/09/2022 1.00     Immature Granulocytes 02/09/2022 1.00 (H)     Nucleated RBC 02/09/2022 0.00     Neutrophils (Absolute) 02/09/2022 4.58     Lymphs (Absolute) 02/09/2022 1.94     Monos (Absolute) 02/09/2022 0.52     Eos (Absolute) 02/09/2022 0.18     Baso (Absolute) 02/09/2022 0.07     Immature Granulocytes (a* 02/09/2022 0.07     NRBC (Absolute) 02/09/2022 0.00     Fasting Status 02/09/2022 Fasting     GFR If  02/09/2022  >60     GFR If Non  Dipak* 02/09/2022 >60    ]

## 2023-08-22 RX ORDER — AMLODIPINE BESYLATE 5 MG/1
5 TABLET ORAL DAILY
Qty: 90 TABLET | Refills: 0 | Status: SHIPPED | OUTPATIENT
Start: 2023-08-22 | End: 2023-11-14 | Stop reason: SDUPTHER

## 2023-08-30 ENCOUNTER — APPOINTMENT (RX ONLY)
Dept: URBAN - METROPOLITAN AREA CLINIC 36 | Facility: CLINIC | Age: 58
Setting detail: DERMATOLOGY
End: 2023-08-30

## 2023-08-30 DIAGNOSIS — Z48.02 ENCOUNTER FOR REMOVAL OF SUTURES: ICD-10-CM

## 2023-08-30 PROCEDURE — ? SUTURE REMOVAL (GLOBAL PERIOD)

## 2023-08-30 ASSESSMENT — LOCATION DETAILED DESCRIPTION DERM: LOCATION DETAILED: RIGHT INFERIOR PREAURICULAR CHEEK

## 2023-08-30 ASSESSMENT — LOCATION SIMPLE DESCRIPTION DERM: LOCATION SIMPLE: RIGHT CHEEK

## 2023-08-30 ASSESSMENT — LOCATION ZONE DERM: LOCATION ZONE: FACE

## 2023-08-30 NOTE — PROCEDURE: SUTURE REMOVAL (GLOBAL PERIOD)
Body Location Override (Optional - Billing Will Still Be Based On Selected Body Map Location If Applicable): right central postauricular cheek
Detail Level: Detailed
Add 56809 Cpt? (Important Note: In 2017 The Use Of 87324 Is Being Tracked By Cms To Determine Future Global Period Reimbursement For Global Periods): no

## 2023-09-13 ENCOUNTER — APPOINTMENT (RX ONLY)
Dept: URBAN - METROPOLITAN AREA CLINIC 36 | Facility: CLINIC | Age: 58
Setting detail: DERMATOLOGY
End: 2023-09-13

## 2023-09-13 DIAGNOSIS — Z48.02 ENCOUNTER FOR REMOVAL OF SUTURES: ICD-10-CM

## 2023-09-13 PROCEDURE — 99024 POSTOP FOLLOW-UP VISIT: CPT

## 2023-09-13 PROCEDURE — ? SUTURE REMOVAL (GLOBAL PERIOD)

## 2023-09-13 ASSESSMENT — LOCATION ZONE DERM: LOCATION ZONE: SCALP

## 2023-09-13 ASSESSMENT — LOCATION DETAILED DESCRIPTION DERM: LOCATION DETAILED: RIGHT INFERIOR POSTAURICULAR SKIN

## 2023-09-13 ASSESSMENT — LOCATION SIMPLE DESCRIPTION DERM: LOCATION SIMPLE: SCALP

## 2023-09-13 NOTE — PROCEDURE: SUTURE REMOVAL (GLOBAL PERIOD)
Detail Level: Detailed
Add 92251 Cpt? (Important Note: In 2017 The Use Of 35044 Is Being Tracked By Cms To Determine Future Global Period Reimbursement For Global Periods): yes

## 2023-10-03 ENCOUNTER — APPOINTMENT (RX ONLY)
Dept: URBAN - METROPOLITAN AREA CLINIC 36 | Facility: CLINIC | Age: 58
Setting detail: DERMATOLOGY
End: 2023-10-03

## 2023-10-03 PROBLEM — C44.42 SQUAMOUS CELL CARCINOMA OF SKIN OF SCALP AND NECK: Status: ACTIVE | Noted: 2023-10-03

## 2023-10-03 PROCEDURE — 17311 MOHS 1 STAGE H/N/HF/G: CPT

## 2023-10-03 PROCEDURE — 13121 CMPLX RPR S/A/L 2.6-7.5 CM: CPT

## 2023-10-03 PROCEDURE — ? MOHS SURGERY

## 2023-10-03 PROCEDURE — 17312 MOHS ADDL STAGE: CPT

## 2023-10-03 NOTE — PROCEDURE: MOHS SURGERY
Body Location Override (Optional - Billing Will Still Be Based On Selected Body Map Location If Applicable): left post auricular
Mohs Case Number: [id number]
Biopsy Photograph Reviewed: Yes
Referring Physician (Optional): [email or web address]
Consent Type: Consent 1 (Standard)
Eye Shield Used: No
Surgeon Performing Repair (Optional): William
Initial Size Of Lesion: 1
X Size Of Lesion In Cm (Optional): 0.9
Number Of Stages: 2
Primary Defect Length In Cm (Final Defect Size - Required For Flaps/Grafts): 3.8
Primary Defect Width In Cm (Final Defect Size - Required For Flaps/Grafts): 1.6
Repair Type: Complex Repair
Oculoplastic Surgeon (A): Saeed
Oculoplastic Surgeon Procedure Text (A): After obtaining clear surgical margins the patient was sent to oculoplastics for surgical repair.  The patient understands they will receive post-surgical care and follow-up from the referring physician's office.
Otolaryngologist Procedure Text (A): After obtaining clear surgical margins the patient was sent to otolaryngology for surgical repair.  The patient understands they will receive post-surgical care and follow-up from the referring physician's office.
Plastic Surgeon Procedure Text (A): After obtaining clear surgical margins the patient was sent to plastics for surgical repair.  The patient understands they will receive post-surgical care and follow-up from the referring physician's office.
Mid-Level (A): did
Mid-Level Procedure Text (A): After obtaining clear surgical margins the patient was sent to a mid-level provider for surgical repair.  The patient understands they will receive post-surgical care and follow-up from the mid-level provider.
Provider Procedure Text (A): After obtaining clear surgical margins the defect was repaired by another provider.
Asc Procedure Text (A): After obtaining clear surgical margins the patient was sent to an ASC for surgical repair.  The patient understands they will receive post-surgical care and follow-up from the ASC physician.
Simple / Intermediate / Complex Repair - Final Wound Length In Cm: 5
Suturegard Retention Suture: 2-0 Nylon
Retention Suture Bite Size: 3 mm
Length To Time In Minutes Device Was In Place: 10
Complex/Intermediate Repair Variations: Crescentic
Undermining Type: Entire Wound
Debridement Text: The wound edges were debrided prior to proceeding with the closure to facilitate wound healing.
Helical Rim Text: The closure involved the helical rim.
Vermilion Border Text: The closure involved the vermilion border.
Nostril Rim Text: The closure involved the nostril rim.
Retention Suture Text: Retention sutures were placed to support the closure and prevent dehiscence.
Secondary Defect Length In Cm (Required For Flaps): 0
Area H Indication Text: Tumors in this location are included in Area H (eyelids, eyebrows, nose, lips, chin, ear, pre-auricular, post-auricular, temple, genitalia, hands, feet, ankles and areola).  Tissue conservation is critical in these anatomic locations.
Area M Indication Text: Tumors in this location are included in Area M (cheek, forehead, scalp, neck, jawline and pretibial skin).  Mohs surgery is indicated for tumors in these anatomic locations.
Area L Indication Text: Tumors in this location are included in Area L (trunk and extremities).  Mohs surgery is indicated for larger tumors, or tumors with aggressive histologic features, in these anatomic locations.
Depth Of Tumor Invasion (For Histology): deep fat
Perineural Invasion (For Histology - Be Specific If Possible): absent
Surgical Defect Width In Cm (Optional): 1.3
Special Stains Stage 1 - Results: Base On Clearance Noted Above
Stage 2: Additional Anesthesia Type: 1% lidocaine with 1:100,000 epinephrine and 408mcg clindamycin/ml and a 1:10 solution of 8.4% sodium bicarbonate
Stage 4: Additional Anesthesia Type: 1% lidocaine with epinephrine
Staging Info: By selecting yes to the question above you will include information on AJCC 8 tumor staging in your Mohs note. Information on tumor staging will be automatically added for SCCs on the head and neck. AJCC 8 includes tumor size, tumor depth, perineural involvement and bone invasion.
Tumor Depth: Less than 6mm from granular layer and no invasion beyond the subcutaneous fat
Was The Patient On Physician Recommended Anticoagulation Therapy?: Please Select the Appropriate Response
Medical Necessity Statement: Based on my medical judgement, Mohs surgery is the most appropriate treatment for this cancer compared to other treatments.
Alternatives Discussed Intro (Do Not Add Period): I discussed alternative treatments to Mohs surgery and specifically discussed the risks and benefits of
Consent 1/Introductory Paragraph: The rationale for Mohs was explained to the patient and consent was obtained. The risks, benefits and alternatives to therapy were discussed in detail. Specifically, the risks of infection, scarring, bleeding, prolonged wound healing, incomplete removal, allergy to anesthesia, nerve injury and recurrence were addressed. Prior to the procedure, the treatment site was clearly identified and confirmed by the patient. All components of Universal Protocol/PAUSE Rule completed.
Consent 2/Introductory Paragraph: Mohs surgery was explained to the patient and consent was obtained. The risks, benefits and alternatives to therapy were discussed in detail. Specifically, the risks of infection, scarring, bleeding, prolonged wound healing, incomplete removal, allergy to anesthesia, nerve injury and recurrence were addressed. Prior to the procedure, the treatment site was clearly identified and confirmed by the patient. All components of Universal Protocol/PAUSE Rule completed.
Consent 3/Introductory Paragraph: I gave the patient a chance to ask questions they had about the procedure.  Following this I explained the Mohs procedure and consent was obtained. The risks, benefits and alternatives to therapy were discussed in detail. Specifically, the risks of infection, scarring, bleeding, prolonged wound healing, incomplete removal, allergy to anesthesia, nerve injury and recurrence were addressed. Prior to the procedure, the treatment site was clearly identified and confirmed by the patient. All components of Universal Protocol/PAUSE Rule completed.
Consent (Temporal Branch)/Introductory Paragraph: The rationale for Mohs was explained to the patient and consent was obtained. The risks, benefits and alternatives to therapy were discussed in detail. Specifically, the risks of damage to the temporal branch of the facial nerve, infection, scarring, bleeding, prolonged wound healing, incomplete removal, allergy to anesthesia, and recurrence were addressed. Prior to the procedure, the treatment site was clearly identified and confirmed by the patient. All components of Universal Protocol/PAUSE Rule completed.
Consent (Marginal Mandibular)/Introductory Paragraph: The rationale for Mohs was explained to the patient and consent was obtained. The risks, benefits and alternatives to therapy were discussed in detail. Specifically, the risks of damage to the marginal mandibular branch of the facial nerve, infection, scarring, bleeding, prolonged wound healing, incomplete removal, allergy to anesthesia, and recurrence were addressed. Prior to the procedure, the treatment site was clearly identified and confirmed by the patient. All components of Universal Protocol/PAUSE Rule completed.
Consent (Spinal Accessory)/Introductory Paragraph: The rationale for Mohs was explained to the patient and consent was obtained. The risks, benefits and alternatives to therapy were discussed in detail. Specifically, the risks of damage to the spinal accessory nerve, infection, scarring, bleeding, prolonged wound healing, incomplete removal, allergy to anesthesia, and recurrence were addressed. Prior to the procedure, the treatment site was clearly identified and confirmed by the patient. All components of Universal Protocol/PAUSE Rule completed.
Consent (Near Eyelid Margin)/Introductory Paragraph: The rationale for Mohs was explained to the patient and consent was obtained. The risks, benefits and alternatives to therapy were discussed in detail. Specifically, the risks of ectropion or eyelid deformity, infection, scarring, bleeding, prolonged wound healing, incomplete removal, allergy to anesthesia, nerve injury and recurrence were addressed. Prior to the procedure, the treatment site was clearly identified and confirmed by the patient. All components of Universal Protocol/PAUSE Rule completed.
Consent (Ear)/Introductory Paragraph: The rationale for Mohs was explained to the patient and consent was obtained. The risks, benefits and alternatives to therapy were discussed in detail. Specifically, the risks of ear deformity, infection, scarring, bleeding, prolonged wound healing, incomplete removal, allergy to anesthesia, nerve injury and recurrence were addressed. Prior to the procedure, the treatment site was clearly identified and confirmed by the patient. All components of Universal Protocol/PAUSE Rule completed.
Consent (Nose)/Introductory Paragraph: The rationale for Mohs was explained to the patient and consent was obtained. The risks, benefits and alternatives to therapy were discussed in detail. Specifically, the risks of nasal deformity, changes in the flow of air through the nose, infection, scarring, bleeding, prolonged wound healing, incomplete removal, allergy to anesthesia, nerve injury and recurrence were addressed. Prior to the procedure, the treatment site was clearly identified and confirmed by the patient. All components of Universal Protocol/PAUSE Rule completed.
Consent (Lip)/Introductory Paragraph: The rationale for Mohs was explained to the patient and consent was obtained. The risks, benefits and alternatives to therapy were discussed in detail. Specifically, the risks of lip deformity, changes in the oral aperture, infection, scarring, bleeding, prolonged wound healing, incomplete removal, allergy to anesthesia, nerve injury and recurrence were addressed. Prior to the procedure, the treatment site was clearly identified and confirmed by the patient. All components of Universal Protocol/PAUSE Rule completed.
Consent (Scalp)/Introductory Paragraph: The rationale for Mohs was explained to the patient and consent was obtained. The risks, benefits and alternatives to therapy were discussed in detail. Specifically, the risks of changes in hair growth pattern secondary to repair, infection, scarring, bleeding, prolonged wound healing, incomplete removal, allergy to anesthesia, nerve injury and recurrence were addressed. Prior to the procedure, the treatment site was clearly identified and confirmed by the patient. All components of Universal Protocol/PAUSE Rule completed.
Detail Level: Detailed
Postop Diagnosis: same
Anesthesia Type: 1% lidocaine with 1:100,000 epinephrine and a 1:10 solution of 8.4% sodium bicarbonate
Anesthesia Volume In Cc: 6
Hemostasis: Electrocautery
Estimated Blood Loss (Cc): less than 5 cc
Repair Anesthesia Method: local infiltration
Brow Lift Text: A midfrontal incision was made medially to the defect to allow access to the tissues just superior to the left eyebrow. Following careful dissection inferiorly in a supraperiosteal plane to the level of the left eyebrow, several 3-0 monocryl sutures were used to resuspend the eyebrow orbicularis oculi muscular unit to the superior frontal bone periosteum. This resulted in an appropriate reapproximation of static eyebrow symmetry and correction of the left brow ptosis.
Deep Sutures: 5-0 Polysorb
Epidermal Sutures: 5-0 Prolene
Epidermal Closure: running cuticular
Suturegard Intro: Intraoperative tissue expansion was performed, utilizing the SUTUREGARD device, in order to reduce wound tension.
Suturegard Body: The suture ends were repeatedly re-tightened and re-clamped to achieve the desired tissue expansion.
Hemigard Intro: Due to skin fragility and wound tension, it was decided to use HEMIGARD adhesive retention suture devices to permit a linear closure. The skin was cleaned and dried for a 6cm distance away from the wound. Excessive hair, if present, was removed to allow for adhesion.
Hemigard Postcare Instructions: The HEMIGARD strips are to remain completely dry for at least 5-7 days.
Donor Site Anesthesia Type: same as repair anesthesia
Graft Basting Suture (Optional): 5-0 Fast Absorbing Gut
Graft Donor Site Epidermal Sutures (Optional): 5-0 Ethibond
Epidermal Closure Graft Donor Site (Optional): simple interrupted
Graft Donor Site Bandage (Optional-Leave Blank If You Don't Want In Note): Aquaphor and telefa placed on wound. Pressure dressing applied to donor site
Closure 2 Information: This tab is for additional flaps and grafts, including complex repair and grafts and complex repair and flaps. You can also specify a different location for the additional defect, if the location is the same you do not need to select a new one. We will insert the automated text for the repair you select below just as we do for solitary flaps and grafts. Please note that at this time if you select a location with a different insurance zone you will need to override the ICD10 and CPT if appropriate.
Closure 3 Information: This tab is for additional flaps and grafts above and beyond our usual structured repairs.  Please note if you enter information here it will not currently bill and you will need to add the billing information manually.
Wound Care: Aquaphor
Dressing: dry sterile dressing
Wound Care (No Sutures): Petrolatum
Suture Removal: 7 days
Unna Boot Text: An Unna boot was placed to help immobilize the limb and facilitate more rapid healing.
Home Suture Removal Text: Patient was provided instructions on removing sutures and will remove their sutures at home.  If they have any questions or difficulties they will call the office.
Post-Care Instructions: I reviewed with the patient in detail post-care instructions. Patient is not to engage in any heavy lifting, exercise, or swimming for the next 14 days. Should the patient develop any fevers, chills, bleeding, severe pain patient will contact the office immediately.
Pain Refusal Text: I offered to prescribe pain medication but the patient refused to take this medication.
Mauc Instructions: By selecting yes to the question below the MAUC number will be added into the note.  This will be calculated automatically based on the diagnosis chosen, the size entered, the body zone selected (H,M,L) and the specific indications you chose. You will also have the option to override the Mohs AUC if you disagree with the automatically calculated number and this option is found in the Case Summary tab.
Where Do You Want The Question To Include Opioid Counseling Located?: Case Summary Tab
Eye Protection Verbiage: Before proceeding with the stage, a plastic scleral shield was inserted. The globe was anesthetized with a few drops of 1% lidocaine with 1:100,000 epinephrine. Then, an appropriate sized scleral shield was chosen and coated with lacrilube ointment. The shield was gently inserted and left in place for the duration of each stage. After the stage was completed, the shield was gently removed.
Mohs Method Verbiage: An incision at a 45 degree angle following the standard Mohs approach was done and the specimen was harvested as a microscopic controlled layer.
Surgeon/Pathologist Verbiage (Will Incorporate Name Of Surgeon From Intro If Not Blank): operated in two distinct and integrated capacities as the surgeon and pathologist.
Mohs Histo Method Verbiage: Each section was then chromacoded and processed in the Mohs lab using the Mohs protocol and submitted for frozen section.
Subsequent Stages Histo Method Verbiage: Using a similar technique to that described above, a thin layer of tissue was removed from all areas where tumor was visible on the previous stage.  The tissue was again oriented, mapped, dyed, and processed as above.
Mohs Rapid Report Verbiage: The area of clinically evident tumor was marked with skin marking ink and appropriately hatched.  The initial incision was made following the Mohs approach through the skin.  The specimen was taken to the lab, divided into the necessary number of pieces, chromacoded and processed according to the Mohs protocol.  This was repeated in successive stages until a tumor free defect was achieved.
Complex Repair Preamble Text (Leave Blank If You Do Not Want): Extensive wide undermining was performed at least 2 cm in all directions.
Intermediate Repair Preamble Text (Leave Blank If You Do Not Want): Undermining was performed with blunt dissection.
M-Plasty Complex Repair Preamble Text (Leave Blank If You Do Not Want): Extensive wide undermining was performed.
Non-Graft Cartilage Fenestration Text: The cartilage was fenestrated with a 2mm punch biopsy to help facilitate healing.
Graft Cartilage Fenestration Text: The cartilage was fenestrated with a 2mm punch biopsy to help facilitate graft survival and healing.
Secondary Intention Text (Leave Blank If You Do Not Want): The defect will heal with secondary intention.
No Repair - Repaired With Adjacent Surgical Defect Text (Leave Blank If You Do Not Want): After obtaining clear surgical margins the defect was repaired concurrently with another surgical defect which was in close approximation.
Unique Flap 1 Name: Myocutaneous Island pedicle Flap
Unique Flap 2 Name: Peng Flap
Unique Flap 3 Name: Mercedes Flap
Unique Flap 4 Name: Banner Flap
Unique Flap 5 Name: tunneled myocutaneous flap
Unique Flap 6 Name: Flory-B?ch flap
Unique Flap 7 Name: Mustarde flap
Unique Flap 8 Name: East to West Flap
Unique Flap 1 Text: A decision was made to reconstruct the defect utilizing a myocutaneous Island pedicle Flap based on the levator labii superioris muscle.  A telfa template was made of the defect.  This telfa template was then used to outline the myocutaneous flap, based along the meilolabial fold.  The donor area for the pedicle flap was then injected with anesthesia.  The flap was excised through the skin and subcutaneous tissue down to the layer of the underlying musculature.  The myocutaneous flap was carefully excised within this deep plane to maintain its blood supply. Based on the muscle. The edges of the donor site were undermined.   The donor site was closed in a primary fashion to the point of transposition.  The pedicle was then transposed into position and sutured.  Once the flap was sutured into place, adequate blood supply was confirmed with blanching and refill.
Unique Flap 2 Text: A decision was made to reconstruct the defect utilizing a Peng Flap (Bilateral Advancement Rotation Flap). Given the location of the defect and the proximity to free margins, this flap was deemed most appropriate.  Using a sterile surgical marker, the appropriate rotation flaps were drawn incorporating the defect and placing the expected incisions within the relaxed skin tension lines where possible.    The area thus outlined was incised deep to adipose tissue with a #15 scalpel blade.  The skin margins were undermined to an appropriate distance in all directions utilizing iris scissors.
Unique Flap 3 Text: The defect edges were debeveled with a #15 scalpel blade.  Given the location of the defect, shape of the defect and the proximity to free margins a Mercedes (double advancement flap) was deemed most appropriate.  Using a sterile surgical marker, the appropriate transposition flaps were drawn incorporating the defect and placing the expected incisions within the relaxed skin tension lines where possible.    The area thus outlined was incised deep to adipose tissue with a #15 scalpel blade.  The skin margins were undermined to an appropriate distance in all directions utilizing iris scissors.  Hemostasis was achieved with electrocautery.  The flaps were then advanced into the defect and anchored with interrupted buried subcutaneous sutures.
Unique Flap 4 Text: The defect edges were debeveled with a #15 scalpel blade.  Given the location of the defect and the proximity to free margins a Banner transposition flap was deemed most appropriate.  Using a sterile surgical marker, an appropriate Banner transposition flap was drawn incorporating the defect.    The area thus outlined was incised deep to adipose tissue with a #15 scalpel blade.  The skin margins were undermined to an appropriate distance in all directions utilizing iris scissors.
Unique Flap 5 Text: A decision was made to reconstruct the defect utilizing a tunneled myocutaneous Island pedicle Flap based on the anterior auricularis muscle.  A telfa template was made of the defect.  This telfa template was then used to outline the myocutaneous flap, based along the preauricular fold.  The donor area for the pedicle flap was then injected with anesthesia.  The flap was excised through the skin and subcutaneous tissue down to the layer of the underlying musculature.  The myocutaneous flap was carefully excised within this deep plane to maintain its blood supply based on the muscle. The edges of the donor site were undermined.   The donor site was closed in a primary fashion to the point of transposition.  The pedicle was then transposed through a tunnel into position and sutured.  Once the flap was sutured into place, adequate blood supply was confirmed with blanching and refill.
Unique Flap 6 Text: A decision was made to reconstruct the defect utilizing an Anti-aging-B?ch Flap (Bilateral helical Advancement Rotation Flap). Given the location of the defect and the proximity to free margins, this flap was deemed most appropriate.  Using a sterile surgical marker, the appropriate flaps were drawn incorporating the defect and placing the expected incisions within the relaxed skin tension lines where possible.  The area thus outlined was incised deep to adipose tissue with a #15 scalpel blade.  The skin margins were undermined to an appropriate distance in all directions utilizing iris scissors. Cartilage was incorporated into the flap arms to maintain helical anatomy.
Unique Flap 7 Text: A decision was made to reconstruct the defect utilizing a Mustarde Flap (Advancement Rotation Flap). Given the location of the defect and the proximity to free margins, this flap was deemed most appropriate.  Using a sterile surgical marker, the appropriate rotation flap was drawn incorporating the defect and placing the expected incisions within the relaxed skin tension lines where possible.    The area thus outlined was incised deep to adipose tissue with a #15 scalpel blade.  The skin margins were undermined to an appropriate distance in all directions utilizing iris scissors. The flap was advanced and rotated under the eyelid with a sling created laterally to keep ectropion minimal.
Unique Flap 8 Text: A decision was made to reconstruct the defect utilizing an East to West Flap (Modified Burows Advancement Flap). Given the location of the defect and the proximity to free margins, this flap was deemed most appropriate.  Using a sterile surgical marker, the appropriate advancement flaps were drawn incorporating the defect and placing the expected incisions within the relaxed skin tension lines where possible.    The area thus outlined was incised deep to adipose tissue with a #15 scalpel blade.  The skin margins were undermined to an appropriate distance in all directions utilizing iris scissors. Minimal alar distortion was created with flap approximation.
Adjacent Tissue Transfer Text: The defect edges were debeveled with a #15 scalpel blade.  Given the location of the defect and the proximity to free margins an adjacent tissue transfer was deemed most appropriate.  Using a sterile surgical marker, an appropriate flap was drawn incorporating the defect and placing the expected incisions within the relaxed skin tension lines where possible.    The area thus outlined was incised deep to adipose tissue with a #15 scalpel blade.  The skin margins were undermined to an appropriate distance in all directions utilizing iris scissors.
Advancement Flap (Single) Text: The defect edges were debeveled with a #15 scalpel blade.  Given the location of the defect and the proximity to free margins a single advancement flap was deemed most appropriate.  Using a sterile surgical marker, an appropriate advancement flap was drawn incorporating the defect and placing the expected incisions within the relaxed skin tension lines where possible.    The area thus outlined was incised deep to adipose tissue with a #15 scalpel blade.  The skin margins were undermined to an appropriate distance in all directions utilizing iris scissors.
Advancement Flap (Double) Text: The defect edges were debeveled with a #15 scalpel blade.  Given the location of the defect and the proximity to free margins a double advancement flap was deemed most appropriate.  Using a sterile surgical marker, the appropriate advancement flaps were drawn incorporating the defect and placing the expected incisions within the relaxed skin tension lines where possible.    The area thus outlined was incised deep to adipose tissue with a #15 scalpel blade.  The skin margins were undermined to an appropriate distance in all directions utilizing iris scissors.
Burow's Advancement Flap Text: The defect edges were debeveled with a #15 scalpel blade.  Given the location of the defect and the proximity to free margins a Burow's advancement flap was deemed most appropriate.  Using a sterile surgical marker, the appropriate advancement flap was drawn incorporating the defect and placing the expected incisions within the relaxed skin tension lines where possible.    The area thus outlined was incised deep to adipose tissue with a #15 scalpel blade.  The skin margins were undermined to an appropriate distance in all directions utilizing iris scissors.
Chonodrocutaneous Helical Advancement Flap Text: The defect edges were debeveled with a #15 scalpel blade.  Given the location of the defect and the proximity to free margins a chondrocutaneous helical advancement flap was deemed most appropriate.  Using a sterile surgical marker, the appropriate advancement flap was drawn incorporating the defect and placing the expected incisions within the relaxed skin tension lines where possible.    The area thus outlined was incised deep to adipose tissue with a #15 scalpel blade.  The skin margins were undermined to an appropriate distance in all directions utilizing iris scissors.
Crescentic Advancement Flap Text: The defect edges were debeveled with a #15 scalpel blade.  Given the location of the defect and the proximity to free margins a crescentic advancement flap was deemed most appropriate.  Using a sterile surgical marker, the appropriate advancement flap was drawn incorporating the defect and placing the expected incisions within the relaxed skin tension lines where possible.    The area thus outlined was incised deep to adipose tissue with a #15 scalpel blade.  The skin margins were undermined to an appropriate distance in all directions utilizing iris scissors.
A-T Advancement Flap Text: The defect edges were debeveled with a #15 scalpel blade.  Given the location of the defect, shape of the defect and the proximity to free margins an A-T advancement flap was deemed most appropriate.  Using a sterile surgical marker, an appropriate advancement flap was drawn incorporating the defect and placing the expected incisions within the relaxed skin tension lines where possible.    The area thus outlined was incised deep to adipose tissue with a #15 scalpel blade.  The skin margins were undermined to an appropriate distance in all directions utilizing iris scissors.
O-T Advancement Flap Text: The defect edges were debeveled with a #15 scalpel blade.  Given the location of the defect, shape of the defect and the proximity to free margins an O-T advancement flap was deemed most appropriate.  Using a sterile surgical marker, an appropriate advancement flap was drawn incorporating the defect and placing the expected incisions within the relaxed skin tension lines where possible.    The area thus outlined was incised deep to adipose tissue with a #15 scalpel blade.  The skin margins were undermined to an appropriate distance in all directions utilizing iris scissors.
O-L Flap Text: The defect edges were debeveled with a #15 scalpel blade.  Given the location of the defect, shape of the defect and the proximity to free margins an O-L flap was deemed most appropriate.  Using a sterile surgical marker, an appropriate advancement flap was drawn incorporating the defect and placing the expected incisions within the relaxed skin tension lines where possible.    The area thus outlined was incised deep to adipose tissue with a #15 scalpel blade.  The skin margins were undermined to an appropriate distance in all directions utilizing iris scissors.
O-Z Flap Text: The defect edges were debeveled with a #15 scalpel blade.  Given the location of the defect, shape of the defect and the proximity to free margins an O-Z flap was deemed most appropriate.  Using a sterile surgical marker, an appropriate transposition flap was drawn incorporating the defect and placing the expected incisions within the relaxed skin tension lines where possible. The area thus outlined was incised deep to adipose tissue with a #15 scalpel blade.  The skin margins were undermined to an appropriate distance in all directions utilizing iris scissors.
Double O-Z Flap Text: The defect edges were debeveled with a #15 scalpel blade.  Given the location of the defect, shape of the defect and the proximity to free margins a Double O-Z flap was deemed most appropriate.  Using a sterile surgical marker, an appropriate transposition flap was drawn incorporating the defect and placing the expected incisions within the relaxed skin tension lines where possible. The area thus outlined was incised deep to adipose tissue with a #15 scalpel blade.  The skin margins were undermined to an appropriate distance in all directions utilizing iris scissors.
V-Y Flap Text: The defect edges were debeveled with a #15 scalpel blade.  Given the location of the defect, shape of the defect and the proximity to free margins a V-Y flap was deemed most appropriate.  Using a sterile surgical marker, an appropriate advancement flap was drawn incorporating the defect and placing the expected incisions within the relaxed skin tension lines where possible.    The area thus outlined was incised deep to adipose tissue with a #15 scalpel blade.  The skin margins were undermined to an appropriate distance in all directions utilizing iris scissors.
Advancement-Rotation Flap Text: The defect edges were debeveled with a #15 scalpel blade.  Given the location of the defect, shape of the defect and the proximity to free margins an advancement-rotation flap was deemed most appropriate.  Using a sterile surgical marker, an appropriate flap was drawn incorporating the defect and placing the expected incisions within the relaxed skin tension lines where possible. The area thus outlined was incised deep to adipose tissue with a #15 scalpel blade.  The skin margins were undermined to an appropriate distance in all directions utilizing iris scissors.
Mercedes Flap Text: The defect edges were debeveled with a #15 scalpel blade.  Given the location of the defect, shape of the defect and the proximity to free margins a Mercedes flap was deemed most appropriate.  Using a sterile surgical marker, an appropriate advancement flap was drawn incorporating the defect and placing the expected incisions within the relaxed skin tension lines where possible. The area thus outlined was incised deep to adipose tissue with a #15 scalpel blade.  The skin margins were undermined to an appropriate distance in all directions utilizing iris scissors.
Modified Advancement Flap Text: The defect edges were debeveled with a #15 scalpel blade.  Given the location of the defect, shape of the defect and the proximity to free margins a modified advancement flap was deemed most appropriate.  Using a sterile surgical marker, an appropriate advancement flap was drawn incorporating the defect and placing the expected incisions within the relaxed skin tension lines where possible.    The area thus outlined was incised deep to adipose tissue with a #15 scalpel blade.  The skin margins were undermined to an appropriate distance in all directions utilizing iris scissors.
Mucosal Advancement Flap Text: Given the location of the defect, shape of the defect and the proximity to free margins a mucosal advancement flap was deemed most appropriate. Incisions were made with a 15 blade scalpel in the appropriate fashion along the cutaneous vermilion border and the mucosal lip. The remaining actinically damaged mucosal tissue was excised.  The mucosal advancement flap was then elevated to the gingival sulcus with care taken to preserve the neurovascular structures and advanced into the primary defect. Care was taken to ensure that precise realignment of the vermilion border was achieved.
Peng Advancement Flap Text: The defect edges were debeveled with a #15 scalpel blade.  Given the location of the defect, shape of the defect and the proximity to free margins a Peng advancement flap was deemed most appropriate.  Using a sterile surgical marker, an appropriate advancement flap was drawn incorporating the defect and placing the expected incisions within the relaxed skin tension lines where possible. The area thus outlined was incised deep to adipose tissue with a #15 scalpel blade.  The skin margins were undermined to an appropriate distance in all directions utilizing iris scissors.
Hatchet Flap Text: The defect edges were debeveled with a #15 scalpel blade.  Given the location of the defect, shape of the defect and the proximity to free margins a hatchet flap based from the glabella was deemed most appropriate.  Using a sterile surgical marker, an appropriate glabellar hatchet flap was drawn incorporating the defect and placing the expected incisions within the relaxed skin tension lines where possible.    The area thus outlined was incised deep to adipose tissue with a #15 scalpel blade.  The skin margins were undermined to an appropriate distance in all directions utilizing iris scissors.
Rotation Flap Text: The defect edges were debeveled with a #15 scalpel blade.  Given the location of the defect, shape of the defect and the proximity to free margins a rotation flap was deemed most appropriate.  Using a sterile surgical marker, an appropriate rotation flap was drawn incorporating the defect and placing the expected incisions within the relaxed skin tension lines where possible.    The area thus outlined was incised deep to adipose tissue with a #15 scalpel blade.  The skin margins were undermined to an appropriate distance in all directions utilizing iris scissors.
Bilateral Rotation Flap Text: The defect edges were debeveled with a #15 scalpel blade. Given the location of the defect, shape of the defect and the proximity to free margins a bilateral rotation flap was deemed most appropriate. Using a sterile surgical marker, an appropriate rotation flap was drawn incorporating the defect and placing the expected incisions within the relaxed skin tension lines where possible. The area thus outlined was incised deep to adipose tissue with a #15 scalpel blade. The skin margins were undermined to an appropriate distance in all directions utilizing iris scissors. Following this, the designed flap was carried over into the primary defect and sutured into place.
Spiral Flap Text: The defect edges were debeveled with a #15 scalpel blade.  Given the location of the defect, shape of the defect and the proximity to free margins a spiral flap was deemed most appropriate.  Using a sterile surgical marker, an appropriate rotation flap was drawn incorporating the defect and placing the expected incisions within the relaxed skin tension lines where possible. The area thus outlined was incised deep to adipose tissue with a #15 scalpel blade.  The skin margins were undermined to an appropriate distance in all directions utilizing iris scissors.
Staged Advancement Flap Text: The defect edges were debeveled with a #15 scalpel blade.  Given the location of the defect, shape of the defect and the proximity to free margins a staged advancement flap was deemed most appropriate.  Using a sterile surgical marker, an appropriate advancement flap was drawn incorporating the defect and placing the expected incisions within the relaxed skin tension lines where possible. The area thus outlined was incised deep to adipose tissue with a #15 scalpel blade.  The skin margins were undermined to an appropriate distance in all directions utilizing iris scissors.
Star Wedge Flap Text: The defect edges were debeveled with a #15 scalpel blade.  Given the location of the defect, shape of the defect and the proximity to free margins a star wedge flap was deemed most appropriate.  Using a sterile surgical marker, an appropriate rotation flap was drawn incorporating the defect and placing the expected incisions within the relaxed skin tension lines where possible. The area thus outlined was incised deep to adipose tissue with a #15 scalpel blade.  The skin margins were undermined to an appropriate distance in all directions utilizing iris scissors.
Transposition Flap Text: The defect edges were debeveled with a #15 scalpel blade.  Given the location of the defect and the proximity to free margins a transposition flap was deemed most appropriate.  Using a sterile surgical marker, an appropriate transposition flap was drawn incorporating the defect.    The area thus outlined was incised deep to adipose tissue with a #15 scalpel blade.  The skin margins were undermined to an appropriate distance in all directions utilizing iris scissors.
Muscle Hinge Flap Text: The defect edges were debeveled with a #15 scalpel blade.  Given the size, depth and location of the defect and the proximity to free margins a muscle hinge flap was deemed most appropriate.  Using a sterile surgical marker, an appropriate hinge flap was drawn incorporating the defect. The area thus outlined was incised with a #15 scalpel blade.  The skin margins were undermined to an appropriate distance in all directions utilizing iris scissors.
Mustarde Flap Text: The defect edges were debeveled with a #15 scalpel blade.  Given the size, depth and location of the defect and the proximity to free margins a Mustarde flap was deemed most appropriate.  Using a sterile surgical marker, an appropriate flap was drawn incorporating the defect. The area thus outlined was incised with a #15 scalpel blade.  The skin margins were undermined to an appropriate distance in all directions utilizing iris scissors.
Nasal Turnover Hinge Flap Text: The defect edges were debeveled with a #15 scalpel blade.  Given the size, depth, location of the defect and the defect being full thickness a nasal turnover hinge flap was deemed most appropriate.  Using a sterile surgical marker, an appropriate hinge flap was drawn incorporating the defect. The area thus outlined was incised with a #15 scalpel blade. The flap was designed to recreate the nasal mucosal lining and the alar rim. The skin margins were undermined to an appropriate distance in all directions utilizing iris scissors.
Nasalis-Muscle-Based Myocutaneous Island Pedicle Flap Text: Using a #15 blade, an incision was made around the donor flap to the level of the nasalis muscle. Wide lateral undermining was then performed in both the subcutaneous plane above the nasalis muscle, and in a submuscular plane just above periosteum. This allowed the formation of a free nasalis muscle axial pedicle (based on the angular artery) which was still attached to the actual cutaneous flap, increasing its mobility and vascular viability. Hemostasis was obtained with pinpoint electrocoagulation. The flap was mobilized into position and the pivotal anchor points positioned and stabilized with buried interrupted sutures. Subcutaneous and dermal tissues were closed in a multilayered fashion with sutures. Tissue redundancies were excised, and the epidermal edges were apposed without significant tension and sutured with sutures.
Orbicularis Oris Muscle Flap Text: The defect edges were debeveled with a #15 scalpel blade.  Given that the defect affected the competency of the oral sphincter an orbicularis oris muscle flap was deemed most appropriate to restore this competency and normal muscle function.  Using a sterile surgical marker, an appropriate flap was drawn incorporating the defect. The area thus outlined was incised with a #15 scalpel blade.
Melolabial Transposition Flap Text: The defect edges were debeveled with a #15 scalpel blade.  Given the location of the defect and the proximity to free margins a melolabial flap was deemed most appropriate.  Using a sterile surgical marker, an appropriate melolabial transposition flap was drawn incorporating the defect.    The area thus outlined was incised deep to adipose tissue with a #15 scalpel blade.  The skin margins were undermined to an appropriate distance in all directions utilizing iris scissors.
Rhombic Flap Text: The defect edges were debeveled with a #15 scalpel blade.  Given the location of the defect and the proximity to free margins a rhombic flap was deemed most appropriate.  Using a sterile surgical marker, an appropriate rhombic flap was drawn incorporating the defect.    The area thus outlined was incised deep to adipose tissue with a #15 scalpel blade.  The skin margins were undermined to an appropriate distance in all directions utilizing iris scissors.
Rhomboid Transposition Flap Text: The defect edges were debeveled with a #15 scalpel blade.  Given the location of the defect and the proximity to free margins a rhomboid transposition flap was deemed most appropriate.  Using a sterile surgical marker, an appropriate rhomboid flap was drawn incorporating the defect.    The area thus outlined was incised deep to adipose tissue with a #15 scalpel blade.  The skin margins were undermined to an appropriate distance in all directions utilizing iris scissors.
Bi-Rhombic Flap Text: The defect edges were debeveled with a #15 scalpel blade.  Given the location of the defect and the proximity to free margins a bi-rhombic flap was deemed most appropriate.  Using a sterile surgical marker, an appropriate rhombic flap was drawn incorporating the defect. The area thus outlined was incised deep to adipose tissue with a #15 scalpel blade.  The skin margins were undermined to an appropriate distance in all directions utilizing iris scissors.
Helical Rim Advancement Flap Text: The defect edges were debeveled with a #15 blade scalpel.  Given the location of the defect and the proximity to free margins (helical rim) a double helical rim advancement flap was deemed most appropriate.  Using a sterile surgical marker, the appropriate advancement flaps were drawn incorporating the defect and placing the expected incisions between the helical rim and antihelix where possible.  The area thus outlined was incised through and through with a #15 scalpel blade.  With a skin hook and iris scissors, the flaps were gently and sharply undermined and freed up.
Bilateral Helical Rim Advancement Flap Text: The defect edges were debeveled with a #15 blade scalpel.  Given the location of the defect and the proximity to free margins (helical rim) a bilateral helical rim advancement flap was deemed most appropriate.  Using a sterile surgical marker, the appropriate advancement flaps were drawn incorporating the defect and placing the expected incisions between the helical rim and antihelix where possible.  The area thus outlined was incised through and through with a #15 scalpel blade.  With a skin hook and iris scissors, the flaps were gently and sharply undermined and freed up.
Ear Star Wedge Flap Text: The defect edges were debeveled with a #15 blade scalpel.  Given the location of the defect and the proximity to free margins (helical rim) an ear star wedge flap was deemed most appropriate.  Using a sterile surgical marker, the appropriate flap was drawn incorporating the defect and placing the expected incisions between the helical rim and antihelix where possible.  The area thus outlined was incised through and through with a #15 scalpel blade.
Banner Transposition Flap Text: The defect edges were debeveled with a #15 scalpel blade.  Given the location of the defect and the proximity to free margins a Banner transposition flap was deemed most appropriate.  Using a sterile surgical marker, an appropriate flap drawn around the defect. The area thus outlined was incised deep to adipose tissue with a #15 scalpel blade.  The skin margins were undermined to an appropriate distance in all directions utilizing iris scissors.
Bilobed Flap Text: The defect edges were debeveled with a #15 scalpel blade.  Given the location of the defect and the proximity to free margins a bilobe flap was deemed most appropriate.  Using a sterile surgical marker, an appropriate bilobe flap drawn around the defect.    The area thus outlined was incised deep to adipose tissue with a #15 scalpel blade.  The skin margins were undermined to an appropriate distance in all directions utilizing iris scissors.
Bilobed Transposition Flap Text: The defect edges were debeveled with a #15 scalpel blade.  Given the location of the defect and the proximity to free margins a bilobed transposition flap was deemed most appropriate.  Using a sterile surgical marker, an appropriate bilobe flap drawn around the defect.    The area thus outlined was incised deep to adipose tissue with a #15 scalpel blade.  The skin margins were undermined to an appropriate distance in all directions utilizing iris scissors.
Trilobed Flap Text: The defect edges were debeveled with a #15 scalpel blade.  Given the location of the defect and the proximity to free margins a trilobed flap was deemed most appropriate.  Using a sterile surgical marker, an appropriate trilobed flap drawn around the defect.    The area thus outlined was incised deep to adipose tissue with a #15 scalpel blade.  The skin margins were undermined to an appropriate distance in all directions utilizing iris scissors.
Dorsal Nasal Flap Text: The defect edges were debeveled with a #15 scalpel blade.  Given the location of the defect and the proximity to free margins a dorsal nasal flap,based upon the glabellar folds, was deemed most appropriate.  Using a sterile surgical marker, an appropriate dorsal nasal flap was drawn around the defect.    The area thus outlined was incised deep to adipose tissue with a #15 scalpel blade.  The skin margins were undermined to an appropriate distance in all directions utilizing iris scissors.
Island Pedicle Flap Text: The defect edges were debeveled with a #15 scalpel blade.  Given the location of the defect, shape of the defect and the proximity to free margins an island pedicle advancement flap was deemed most appropriate.  Using a sterile surgical marker, an appropriate advancement flap was drawn incorporating the defect, outlining the appropriate donor tissue and placing the expected incisions within the relaxed skin tension lines where possible.    The area thus outlined was incised deep to adipose tissue with a #15 scalpel blade.  The skin margins were undermined to an appropriate distance in all directions around the primary defect and laterally outward around the island pedicle utilizing iris scissors.  There was minimal undermining beneath the pedicle flap.
Island Pedicle Flap With Canthal Suspension Text: The defect edges were debeveled with a #15 scalpel blade.  Given the location of the defect, shape of the defect and the proximity to free margins an island pedicle advancement flap was deemed most appropriate.  Using a sterile surgical marker, an appropriate advancement flap was drawn incorporating the defect, outlining the appropriate donor tissue and placing the expected incisions within the relaxed skin tension lines where possible. The area thus outlined was incised deep to adipose tissue with a #15 scalpel blade.  The skin margins were undermined to an appropriate distance in all directions around the primary defect and laterally outward around the island pedicle utilizing iris scissors.  There was minimal undermining beneath the pedicle flap. A suspension suture was placed in the canthal tendon to prevent tension and prevent ectropion.
Alar Island Pedicle Flap Text: The defect edges were debeveled with a #15 scalpel blade.  Given the location of the defect, shape of the defect and the proximity to the alar rim an island pedicle advancement flap was deemed most appropriate.  Using a sterile surgical marker, an appropriate advancement flap was drawn incorporating the defect, outlining the appropriate donor tissue and placing the expected incisions within the nasal ala running parallel to the alar rim. The area thus outlined was incised with a #15 scalpel blade.  The skin margins were undermined minimally to an appropriate distance in all directions around the primary defect and laterally outward around the island pedicle utilizing iris scissors.  There was minimal undermining beneath the pedicle flap.
Double Island Pedicle Flap Text: The defect edges were debeveled with a #15 scalpel blade.  Given the location of the defect, shape of the defect and the proximity to free margins a double island pedicle advancement flap was deemed most appropriate.  Using a sterile surgical marker, an appropriate advancement flap was drawn incorporating the defect, outlining the appropriate donor tissue and placing the expected incisions within the relaxed skin tension lines where possible.    The area thus outlined was incised deep to adipose tissue with a #15 scalpel blade.  The skin margins were undermined to an appropriate distance in all directions around the primary defect and laterally outward around the island pedicle utilizing iris scissors.  There was minimal undermining beneath the pedicle flap.
Island Pedicle Flap-Requiring Vessel Identification Text: The defect edges were debeveled with a #15 scalpel blade.  Given the location of the defect, shape of the defect and the proximity to free margins an island pedicle advancement flap was deemed most appropriate.  Using a sterile surgical marker, an appropriate advancement flap was drawn, based on the axial vessel mentioned above, incorporating the defect, outlining the appropriate donor tissue and placing the expected incisions within the relaxed skin tension lines where possible.    The area thus outlined was incised deep to adipose tissue with a #15 scalpel blade.  The skin margins were undermined to an appropriate distance in all directions around the primary defect and laterally outward around the island pedicle utilizing iris scissors.  There was minimal undermining beneath the pedicle flap.
Keystone Flap Text: The defect edges were debeveled with a #15 scalpel blade.  Given the location of the defect, shape of the defect a keystone flap was deemed most appropriate.  Using a sterile surgical marker, an appropriate keystone flap was drawn incorporating the defect, outlining the appropriate donor tissue and placing the expected incisions within the relaxed skin tension lines where possible. The area thus outlined was incised deep to adipose tissue with a #15 scalpel blade.  The skin margins were undermined to an appropriate distance in all directions around the primary defect and laterally outward around the flap utilizing iris scissors.
O-T Plasty Text: The defect edges were debeveled with a #15 scalpel blade.  Given the location of the defect, shape of the defect and the proximity to free margins an O-T plasty was deemed most appropriate.  Using a sterile surgical marker, an appropriate O-T plasty was drawn incorporating the defect and placing the expected incisions within the relaxed skin tension lines where possible.    The area thus outlined was incised deep to adipose tissue with a #15 scalpel blade.  The skin margins were undermined to an appropriate distance in all directions utilizing iris scissors.
O-Z Plasty Text: The defect edges were debeveled with a #15 scalpel blade.  Given the location of the defect, shape of the defect and the proximity to free margins an O-Z plasty (double transposition flap) was deemed most appropriate.  Using a sterile surgical marker, the appropriate transposition flaps were drawn incorporating the defect and placing the expected incisions within the relaxed skin tension lines where possible.    The area thus outlined was incised deep to adipose tissue with a #15 scalpel blade.  The skin margins were undermined to an appropriate distance in all directions utilizing iris scissors.  Hemostasis was achieved with electrocautery.  The flaps were then transposed into place, one clockwise and the other counterclockwise, and anchored with interrupted buried subcutaneous sutures.
Double O-Z Plasty Text: The defect edges were debeveled with a #15 scalpel blade.  Given the location of the defect, shape of the defect and the proximity to free margins a Double O-Z plasty (double transposition flap) was deemed most appropriate.  Using a sterile surgical marker, the appropriate transposition flaps were drawn incorporating the defect and placing the expected incisions within the relaxed skin tension lines where possible. The area thus outlined was incised deep to adipose tissue with a #15 scalpel blade.  The skin margins were undermined to an appropriate distance in all directions utilizing iris scissors.  Hemostasis was achieved with electrocautery.  The flaps were then transposed into place, one clockwise and the other counterclockwise, and anchored with interrupted buried subcutaneous sutures.
V-Y Plasty Text: The defect edges were debeveled with a #15 scalpel blade.  Given the location of the defect, shape of the defect and the proximity to free margins an V-Y advancement flap was deemed most appropriate.  Using a sterile surgical marker, an appropriate advancement flap was drawn incorporating the defect and placing the expected incisions within the relaxed skin tension lines where possible.    The area thus outlined was incised deep to adipose tissue with a #15 scalpel blade.  The skin margins were undermined to an appropriate distance in all directions utilizing iris scissors.
H Plasty Text: Given the location of the defect, shape of the defect and the proximity to free margins a H-plasty was deemed most appropriate for repair.  Using a sterile surgical marker, the appropriate advancement arms of the H-plasty were drawn incorporating the defect and placing the expected incisions within the relaxed skin tension lines where possible. The area thus outlined was incised deep to adipose tissue with a #15 scalpel blade. The skin margins were undermined to an appropriate distance in all directions utilizing iris scissors.  The opposing advancement arms were then advanced into place in opposite direction and anchored with interrupted buried subcutaneous sutures.
W Plasty Text: The lesion was extirpated to the level of the fat with a #15 scalpel blade.  Given the location of the defect, shape of the defect and the proximity to free margins a W-plasty was deemed most appropriate for repair.  Using a sterile surgical marker, the appropriate transposition arms of the W-plasty were drawn incorporating the defect and placing the expected incisions within the relaxed skin tension lines where possible.    The area thus outlined was incised deep to adipose tissue with a #15 scalpel blade.  The skin margins were undermined to an appropriate distance in all directions utilizing iris scissors.  The opposing transposition arms were then transposed into place in opposite direction and anchored with interrupted buried subcutaneous sutures.
Z Plasty Text: The lesion was extirpated to the level of the fat with a #15 scalpel blade.  Given the location of the defect, shape of the defect and the proximity to free margins a Z-plasty was deemed most appropriate for repair.  Using a sterile surgical marker, the appropriate transposition arms of the Z-plasty were drawn incorporating the defect and placing the expected incisions within the relaxed skin tension lines where possible.    The area thus outlined was incised deep to adipose tissue with a #15 scalpel blade.  The skin margins were undermined to an appropriate distance in all directions utilizing iris scissors.  The opposing transposition arms were then transposed into place in opposite direction and anchored with interrupted buried subcutaneous sutures.
Double Z Plasty Text: The lesion was extirpated to the level of the fat with a #15 scalpel blade. Given the location of the defect, shape of the defect and the proximity to free margins a double Z-plasty was deemed most appropriate for repair. Using a sterile surgical marker, the appropriate transposition arms of the double Z-plasty were drawn incorporating the defect and placing the expected incisions within the relaxed skin tension lines where possible. The area thus outlined was incised deep to adipose tissue with a #15 scalpel blade. The skin margins were undermined to an appropriate distance in all directions utilizing iris scissors. The opposing transposition arms were then transposed and carried over into place in opposite direction and anchored with interrupted buried subcutaneous sutures.
Zygomaticofacial Flap Text: Given the location of the defect, shape of the defect and the proximity to free margins a zygomaticofacial flap was deemed most appropriate for repair.  Using a sterile surgical marker, the appropriate flap was drawn incorporating the defect and placing the expected incisions within the relaxed skin tension lines where possible. The area thus outlined was incised deep to adipose tissue with a #15 scalpel blade with preservation of a vascular pedicle.  The skin margins were undermined to an appropriate distance in all directions utilizing iris scissors.  The flap was then placed into the defect and anchored with interrupted buried subcutaneous sutures.
Cheek Interpolation Flap Text: A decision was made to reconstruct the defect utilizing an interpolation axial flap and a staged reconstruction.  A telfa template was made of the defect.  This telfa template was then used to outline the Cheek Interpolation flap.  The donor area for the pedicle flap was then injected with anesthesia.  The flap was excised through the skin and subcutaneous tissue down to the layer of the underlying musculature.  The interpolation flap was carefully excised within this deep plane to maintain its blood supply.  The edges of the donor site were undermined.   The donor site was closed in a primary fashion.  The pedicle was then rotated into position and sutured.  Once the tube was sutured into place, adequate blood supply was confirmed with blanching and refill.  The pedicle was then wrapped with xeroform gauze and dressed appropriately with a telfa and gauze bandage to ensure continued blood supply and protect the attached pedicle.
Cheek-To-Nose Interpolation Flap Text: A decision was made to reconstruct the defect utilizing an interpolation axial flap and a staged reconstruction.  A telfa template was made of the defect.  This telfa template was then used to outline the Cheek-To-Nose Interpolation flap.  The donor area for the pedicle flap was then injected with anesthesia.  The flap was excised through the skin and subcutaneous tissue down to the layer of the underlying musculature.  The interpolation flap was carefully excised within this deep plane to maintain its blood supply.  The edges of the donor site were undermined.   The donor site was closed in a primary fashion.  The pedicle was then rotated into position and sutured.  Once the tube was sutured into place, adequate blood supply was confirmed with blanching and refill.  The pedicle was then wrapped with xeroform gauze and dressed appropriately with a telfa and gauze bandage to ensure continued blood supply and protect the attached pedicle.
Interpolation Flap Text: A decision was made to reconstruct the defect utilizing an interpolation axial flap and a staged reconstruction.  A telfa template was made of the defect.  This telfa template was then used to outline the interpolation flap.  The donor area for the pedicle flap was then injected with anesthesia.  The flap was excised through the skin and subcutaneous tissue down to the layer of the underlying musculature.  The interpolation flap was carefully excised within this deep plane to maintain its blood supply.  The edges of the donor site were undermined.   The donor site was closed in a primary fashion.  The pedicle was then rotated into position and sutured.  Once the tube was sutured into place, adequate blood supply was confirmed with blanching and refill.  The pedicle was then wrapped with xeroform gauze and dressed appropriately with a telfa and gauze bandage to ensure continued blood supply and protect the attached pedicle.
Melolabial Interpolation Flap Text: A decision was made to reconstruct the defect utilizing an interpolation axial flap and a staged reconstruction.  A telfa template was made of the defect.  This telfa template was then used to outline the melolabial interpolation flap.  The donor area for the pedicle flap was then injected with anesthesia.  The flap was excised through the skin and subcutaneous tissue down to the layer of the underlying musculature.  The pedicle flap was carefully excised within this deep plane to maintain its blood supply.  The edges of the donor site were undermined.   The donor site was closed in a primary fashion.  The pedicle was then rotated into position and sutured.  Once the tube was sutured into place, adequate blood supply was confirmed with blanching and refill.  The pedicle was then wrapped with xeroform gauze and dressed appropriately with a telfa and gauze bandage to ensure continued blood supply and protect the attached pedicle.
Mastoid Interpolation Flap Text: A decision was made to reconstruct the defect utilizing an interpolation axial flap and a staged reconstruction.  A telfa template was made of the defect.  This telfa template was then used to outline the mastoid interpolation flap.  The donor area for the pedicle flap was then injected with anesthesia.  The flap was excised through the skin and subcutaneous tissue down to the layer of the underlying musculature.  The pedicle flap was carefully excised within this deep plane to maintain its blood supply.  The edges of the donor site were undermined.   The donor site was closed in a primary fashion.  The pedicle was then rotated into position and sutured.  Once the tube was sutured into place, adequate blood supply was confirmed with blanching and refill.  The pedicle was then wrapped with xeroform gauze and dressed appropriately with a telfa and gauze bandage to ensure continued blood supply and protect the attached pedicle.
Posterior Auricular Interpolation Flap Text: A decision was made to reconstruct the defect utilizing an interpolation axial flap and a staged reconstruction.  A telfa template was made of the defect.  This telfa template was then used to outline the posterior auricular interpolation flap.  The donor area for the pedicle flap was then injected with anesthesia.  The flap was excised through the skin and subcutaneous tissue down to the layer of the underlying musculature.  The pedicle flap was carefully excised within this deep plane to maintain its blood supply.  The edges of the donor site were undermined.   The donor site was closed in a primary fashion.  The pedicle was then rotated into position and sutured.  Once the tube was sutured into place, adequate blood supply was confirmed with blanching and refill.  The pedicle was then wrapped with xeroform gauze and dressed appropriately with a telfa and gauze bandage to ensure continued blood supply and protect the attached pedicle.
Paramedian Forehead Flap Text: A decision was made to reconstruct the defect utilizing an interpolation axial flap and a staged reconstruction.  A telfa template was made of the defect.  This telfa template was then used to outline the paramedian forehead pedicle flap.  The donor area for the pedicle flap was then injected with anesthesia.  The flap was excised through the skin and subcutaneous tissue down to the layer of the underlying musculature.  The pedicle flap was carefully excised within this deep plane to maintain its blood supply.  The edges of the donor site were undermined.   The donor site was closed in a primary fashion.  The pedicle was then rotated into position and sutured.  Once the tube was sutured into place, adequate blood supply was confirmed with blanching and refill.  The pedicle was then wrapped with xeroform gauze and dressed appropriately with a telfa and gauze bandage to ensure continued blood supply and protect the attached pedicle.
Abbe Flap (Upper To Lower Lip) Text: The defect of the lower lip was assessed and measured.  Given the location and size of the defect, an Abbe flap was deemed most appropriate.  Using a sterile surgical marker, an appropriate Abbe flap was measured and drawn on the upper lip. Local anesthesia was then infiltrated.  A scalpel was then used to incise the upper lip through and through the skin, vermilion, muscle and mucosa, leaving the flap pedicled on the opposite side.  The flap was then rotated and transferred to the lower lip defect.  The flap was then sutured into place with a three layer technique, closing the orbicularis oris muscle layer with subcutaneous buried sutures, followed by a mucosal layer and an epidermal layer.
Abbe Flap (Lower To Upper Lip) Text: The defect of the upper lip was assessed and measured.  Given the location and size of the defect, an Abbe flap was deemed most appropriate.  Using a sterile surgical marker, an appropriate Abbe flap was measured and drawn on the lower lip. Local anesthesia was then infiltrated. A scalpel was then used to incise the upper lip through and through the skin, vermilion, muscle and mucosa, leaving the flap pedicled on the opposite side.  The flap was then rotated and transferred to the lower lip defect.  The flap was then sutured into place with a three layer technique, closing the orbicularis oris muscle layer with subcutaneous buried sutures, followed by a mucosal layer and an epidermal layer.
Estlander Flap (Upper To Lower Lip) Text: The defect of the lower lip was assessed and measured.  Given the location and size of the defect, an Estlander flap was deemed most appropriate.  Using a sterile surgical marker, an appropriate Estlander flap was measured and drawn on the upper lip. Local anesthesia was then infiltrated. A scalpel was then used to incise the lateral aspect of the flap, through skin, muscle and mucosa, leaving the flap pedicled medially.  The flap was then rotated and positioned to fill the lower lip defect.  The flap was then sutured into place with a three layer technique, closing the orbicularis oris muscle layer with subcutaneous buried sutures, followed by a mucosal layer and an epidermal layer.
Cheiloplasty (Less Than 50%) Text: A decision was made to reconstruct the defect with a  cheiloplasty.  The defect was undermined extensively.  Additional obicularis oris muscle was excised with a 15 blade scalpel.  The defect was converted into a full thickness wedge, of less than 50% of the vertical height of the lip, to facilite a better cosmetic result.  Small vessels were then tied off with 5-0 monocyrl. The obicularis oris, superficial fascia, adipose and dermis were then reapproximated.  After the deeper layers were approximated the epidermis was reapproximated with particular care given to realign the vermilion border.
Cheiloplasty (Complex) Text: A decision was made to reconstruct the defect with a  cheiloplasty.  The defect was undermined extensively.  Additional obicularis oris muscle was excised with a 15 blade scalpel.  The defect was converted into a full thickness wedge to facilite a better cosmetic result.  Small vessels were then tied off with 5-0 monocyrl. The obicularis oris, superficial fascia, adipose and dermis were then reapproximated.  After the deeper layers were approximated the epidermis was reapproximated with particular care given to realign the vermilion border.
Ear Wedge Repair Text: A wedge excision was completed by carrying down an excision through the full thickness of the ear and cartilage with an inward facing Burow's triangle. The wound was then closed in a layered fashion.
Full Thickness Lip Wedge Repair (Flap) Text: Given the location of the defect and the proximity to free margins a full thickness wedge repair was deemed most appropriate.  Using a sterile surgical marker, the appropriate repair was drawn incorporating the defect and placing the expected incisions perpendicular to the vermilion border.  The vermilion border was also meticulously outlined to ensure appropriate reapproximation during the repair.  The area thus outlined was incised through and through with a #15 scalpel blade.  The muscularis and dermis were reaproximated with deep sutures following hemostasis. Care was taken to realign the vermilion border before proceeding with the superficial closure.  Once the vermilion was realigned the superfical and mucosal closure was finished.
Ftsg Text: The defect edges were debeveled with a #15 scalpel blade.  Given the location of the defect, shape of the defect and the proximity to free margins a full thickness skin graft was deemed most appropriate.  Using a sterile surgical marker, the primary defect shape was transferred to the donor site. The area thus outlined was incised deep to adipose tissue with a #15 scalpel blade.  The harvested graft was then trimmed of adipose tissue until only dermis and epidermis was left.  The skin margins of the secondary defect were undermined to an appropriate distance in all directions utilizing iris scissors.  The secondary defect was closed with interrupted buried subcutaneous sutures.  The skin edges were then re-apposed with running  sutures.  The skin graft was then placed in the primary defect and oriented appropriately.
Split-Thickness Skin Graft Text: The defect edges were debeveled with a #15 scalpel blade.  Given the location of the defect, shape of the defect and the proximity to free margins a split thickness skin graft was deemed most appropriate.  Using a sterile surgical marker, the primary defect shape was transferred to the donor site. The split thickness graft was then harvested.  The skin graft was then placed in the primary defect and oriented appropriately.
Pinch Graft Text: The defect edges were debeveled with a #15 scalpel blade. Given the location of the defect, shape of the defect and the proximity to free margins a pinch graft was deemed most appropriate. Using a sterile surgical marker, the primary defect shape was transferred to the donor site. The area thus outlined was incised deep to adipose tissue with a #15 scalpel blade.  The harvested graft was then trimmed of adipose tissue until only dermis and epidermis was left. The skin margins of the secondary defect were undermined to an appropriate distance in all directions utilizing iris scissors.  The secondary defect was closed with interrupted buried subcutaneous sutures.  The skin edges were then re-apposed with running  sutures.  The skin graft was then placed in the primary defect and oriented appropriately.
Burow's Graft Text: The defect edges were debeveled with a #15 scalpel blade.  Given the location of the defect, shape of the defect, the proximity to free margins and the presence of a standing cone deformity a Burow's skin graft was deemed most appropriate. The standing cone was removed and this tissue was then trimmed to the shape of the primary defect. The adipose tissue was also removed until only dermis and epidermis were left.  The skin margins of the secondary defect were undermined to an appropriate distance in all directions utilizing iris scissors.  The secondary defect was closed with interrupted buried subcutaneous sutures.  The skin edges were then re-apposed with running  sutures.  The skin graft was then placed in the primary defect and oriented appropriately.
Cartilage Graft Text: The defect edges were debeveled with a #15 scalpel blade.  Given the location of the defect, shape of the defect, the fact the defect involved a full thickness cartilage defect a cartilage graft was deemed most appropriate.  An appropriate donor site was identified, cleansed, and anesthetized. The cartilage graft was then harvested and transferred to the recipient site, oriented appropriately and then sutured into place.  The secondary defect was then repaired using a primary closure.
Composite Graft Text: The defect edges were debeveled with a #15 scalpel blade.  Given the location of the defect, shape of the defect, the proximity to free margins and the fact the defect was full thickness a composite graft was deemed most appropriate.  The defect was outline and then transferred to the donor site.  A full thickness graft was then excised from the donor site. The graft was then placed in the primary defect, oriented appropriately and then sutured into place.  The secondary defect was then repaired using a primary closure.
Epidermal Autograft Text: The defect edges were debeveled with a #15 scalpel blade.  Given the location of the defect, shape of the defect and the proximity to free margins an epidermal autograft was deemed most appropriate.  Using a sterile surgical marker, the primary defect shape was transferred to the donor site. The epidermal graft was then harvested.  The skin graft was then placed in the primary defect and oriented appropriately.
Dermal Autograft Text: The defect edges were debeveled with a #15 scalpel blade.  Given the location of the defect, shape of the defect and the proximity to free margins a dermal autograft was deemed most appropriate.  Using a sterile surgical marker, the primary defect shape was transferred to the donor site. The area thus outlined was incised deep to adipose tissue with a #15 scalpel blade.  The harvested graft was then trimmed of adipose and epidermal tissue until only dermis was left.  The skin graft was then placed in the primary defect and oriented appropriately.
Skin Substitute Text: The defect edges were debeveled with a #15 scalpel blade.  Given the location of the defect, shape of the defect and the proximity to free margins a skin substitute graft was deemed most appropriate.  The graft material was trimmed to fit the size of the defect. The graft was then placed in the primary defect and oriented appropriately.
Tissue Cultured Epidermal Autograft Text: The defect edges were debeveled with a #15 scalpel blade.  Given the location of the defect, shape of the defect and the proximity to free margins a tissue cultured epidermal autograft was deemed most appropriate.  The graft was then trimmed to fit the size of the defect.  The graft was then placed in the primary defect and oriented appropriately.
Xenograft Text: The defect edges were debeveled with a #15 scalpel blade.  Given the location of the defect, shape of the defect and the proximity to free margins a xenograft was deemed most appropriate.  The graft was then trimmed to fit the size of the defect.  The graft was then placed in the primary defect and oriented appropriately.
Purse String (Simple) Text: Given the location of the defect and the characteristics of the surrounding skin a purse string closure was deemed most appropriate.  Undermining was performed circumfirentially around the surgical defect.  A purse string suture was then placed and tightened.
Purse String (Intermediate) Text: Given the location of the defect and the characteristics of the surrounding skin a purse string intermediate closure was deemed most appropriate.  Undermining was performed circumfirentially around the surgical defect.  A purse string suture was then placed and tightened.
Partial Purse String (Simple) Text: Given the location of the defect and the characteristics of the surrounding skin a simple purse string closure was deemed most appropriate.  Undermining was performed circumfirentially around the surgical defect.  A purse string suture was then placed and tightened. Wound tension only allowed a partial closure of the circular defect.
Partial Purse String (Intermediate) Text: Given the location of the defect and the characteristics of the surrounding skin an intermediate purse string closure was deemed most appropriate.  Undermining was performed circumfirentially around the surgical defect.  A purse string suture was then placed and tightened. Wound tension only allowed a partial closure of the circular defect.
Localized Dermabrasion With Wire Brush Text: The patient was draped in routine manner.  Localized dermabrasion using 3 x 17 mm wire brush was performed in routine manner to papillary dermis. This spot dermabrasion is being performed to complete skin cancer reconstruction. It also will eliminate the other sun damaged precancerous cells that are known to be part of the regional effect of a lifetime's worth of sun exposure. This localized dermabrasion is therapeutic and should not be considered cosmetic in any regard.
Tarsorrhaphy Text: A tarsorrhaphy was performed using Frost sutures.
Intermediate Repair And Flap Additional Text (Will Appearing After The Standard Complex Repair Text): The intermediate repair was not sufficient to completely close the primary defect. The remaining additional defect was repaired with the flap mentioned below.
Intermediate Repair And Graft Additional Text (Will Appearing After The Standard Complex Repair Text): The intermediate repair was not sufficient to completely close the primary defect. The remaining additional defect was repaired with the graft mentioned below.
Complex Repair And Flap Additional Text (Will Appearing After The Standard Complex Repair Text): The complex repair was not sufficient to completely close the primary defect. The remaining additional defect was repaired with the flap mentioned below.
Complex Repair And Graft Additional Text (Will Appearing After The Standard Complex Repair Text): The complex repair was not sufficient to completely close the primary defect. The remaining additional defect was repaired with the graft mentioned below.
Manual Repair Warning Statement: We plan on removing the manually selected variable below in favor of our much easier automatic structured text blocks found in the previous tab. We decided to do this to help make the flow better and give you the full power of structured data. Manual selection is never going to be ideal in our platform and I would encourage you to avoid using manual selection from this point on, especially since I will be sunsetting this feature. It is important that you do one of two things with the customized text below. First, you can save all of the text in a word file so you can have it for future reference. Second, transfer the text to the appropriate area in the Library tab. Lastly, if there is a flap or graft type which we do not have you need to let us know right away so I can add it in before the variable is hidden. No need to panic, we plan to give you roughly 6 months to make the change.
Same Histology In Subsequent Stages Text: The pattern and morphology of the tumor is as described in the first stage.
No Residual Tumor Seen Histology Text: There were no malignant cells seen in the sections examined.
Inflammation Suggestive Of Cancer Camouflage Histology Text: There was a dense lymphocytic infiltrate which prevented adequate histologic evaluation of adjacent structures.
Bcc Histology Text: There were numerous aggregates of basaloid cells.
Bcc Infiltrative Histology Text: There were numerous aggregates of basaloid cells demonstrating an infiltrative pattern.
Mart-1 - Positive Histology Text: MART-1 staining demonstrates areas of higher density and clustering of melanocytes with Pagetoid spread upwards within the epidermis. The surgical margins are positive for tumor cells.
Mart-1 - Negative Histology Text: MART-1 staining demonstrates a normal density and pattern of melanocytes along the dermal-epidermal junction. The surgical margins are negative for tumor cells.
Information: Selecting Yes will display possible errors in your note based on the variables you have selected. This validation is only offered as a suggestion for you. PLEASE NOTE THAT THE VALIDATION TEXT WILL BE REMOVED WHEN YOU FINALIZE YOUR NOTE. IF YOU WANT TO FAX A PRELIMINARY NOTE YOU WILL NEED TO TOGGLE THIS TO 'NO' IF YOU DO NOT WANT IT IN YOUR FAXED NOTE.

## 2023-10-12 ENCOUNTER — APPOINTMENT (RX ONLY)
Dept: URBAN - METROPOLITAN AREA CLINIC 36 | Facility: CLINIC | Age: 58
Setting detail: DERMATOLOGY
End: 2023-10-12

## 2023-10-12 DIAGNOSIS — Z48.02 ENCOUNTER FOR REMOVAL OF SUTURES: ICD-10-CM

## 2023-10-12 PROCEDURE — ? SUTURE REMOVAL (GLOBAL PERIOD)

## 2023-10-12 ASSESSMENT — LOCATION DETAILED DESCRIPTION DERM: LOCATION DETAILED: LEFT SUPERIOR LATERAL NECK

## 2023-10-12 ASSESSMENT — LOCATION ZONE DERM: LOCATION ZONE: NECK

## 2023-10-12 ASSESSMENT — LOCATION SIMPLE DESCRIPTION DERM: LOCATION SIMPLE: NECK

## 2023-10-12 NOTE — PROCEDURE: SUTURE REMOVAL (GLOBAL PERIOD)
Detail Level: Detailed
Add 35089 Cpt? (Important Note: In 2017 The Use Of 58321 Is Being Tracked By Cms To Determine Future Global Period Reimbursement For Global Periods): no

## 2023-10-16 ENCOUNTER — TELEPHONE (OUTPATIENT)
Dept: HEALTH INFORMATION MANAGEMENT | Facility: OTHER | Age: 58
End: 2023-10-16

## 2023-11-06 SDOH — ECONOMIC STABILITY: INCOME INSECURITY: IN THE LAST 12 MONTHS, WAS THERE A TIME WHEN YOU WERE NOT ABLE TO PAY THE MORTGAGE OR RENT ON TIME?: NO

## 2023-11-06 SDOH — ECONOMIC STABILITY: FOOD INSECURITY: WITHIN THE PAST 12 MONTHS, THE FOOD YOU BOUGHT JUST DIDN'T LAST AND YOU DIDN'T HAVE MONEY TO GET MORE.: SOMETIMES TRUE

## 2023-11-06 SDOH — HEALTH STABILITY: PHYSICAL HEALTH: ON AVERAGE, HOW MANY DAYS PER WEEK DO YOU ENGAGE IN MODERATE TO STRENUOUS EXERCISE (LIKE A BRISK WALK)?: 2 DAYS

## 2023-11-06 SDOH — ECONOMIC STABILITY: HOUSING INSECURITY
IN THE LAST 12 MONTHS, WAS THERE A TIME WHEN YOU DID NOT HAVE A STEADY PLACE TO SLEEP OR SLEPT IN A SHELTER (INCLUDING NOW)?: YES

## 2023-11-06 SDOH — ECONOMIC STABILITY: TRANSPORTATION INSECURITY
IN THE PAST 12 MONTHS, HAS THE LACK OF TRANSPORTATION KEPT YOU FROM MEDICAL APPOINTMENTS OR FROM GETTING MEDICATIONS?: YES

## 2023-11-06 SDOH — ECONOMIC STABILITY: FOOD INSECURITY: WITHIN THE PAST 12 MONTHS, YOU WORRIED THAT YOUR FOOD WOULD RUN OUT BEFORE YOU GOT MONEY TO BUY MORE.: NEVER TRUE

## 2023-11-06 SDOH — HEALTH STABILITY: PHYSICAL HEALTH: ON AVERAGE, HOW MANY MINUTES DO YOU ENGAGE IN EXERCISE AT THIS LEVEL?: 20 MIN

## 2023-11-06 SDOH — ECONOMIC STABILITY: INCOME INSECURITY: HOW HARD IS IT FOR YOU TO PAY FOR THE VERY BASICS LIKE FOOD, HOUSING, MEDICAL CARE, AND HEATING?: SOMEWHAT HARD

## 2023-11-06 SDOH — ECONOMIC STABILITY: TRANSPORTATION INSECURITY
IN THE PAST 12 MONTHS, HAS LACK OF TRANSPORTATION KEPT YOU FROM MEETINGS, WORK, OR FROM GETTING THINGS NEEDED FOR DAILY LIVING?: YES

## 2023-11-06 ASSESSMENT — SOCIAL DETERMINANTS OF HEALTH (SDOH)
HOW OFTEN DO YOU ATTENT MEETINGS OF THE CLUB OR ORGANIZATION YOU BELONG TO?: NEVER
IN A TYPICAL WEEK, HOW MANY TIMES DO YOU TALK ON THE PHONE WITH FAMILY, FRIENDS, OR NEIGHBORS?: ONCE A WEEK
HOW OFTEN DO YOU GET TOGETHER WITH FRIENDS OR RELATIVES?: NEVER
HOW OFTEN DO YOU ATTEND CHURCH OR RELIGIOUS SERVICES?: NEVER
DO YOU BELONG TO ANY CLUBS OR ORGANIZATIONS SUCH AS CHURCH GROUPS UNIONS, FRATERNAL OR ATHLETIC GROUPS, OR SCHOOL GROUPS?: NO

## 2023-11-06 ASSESSMENT — LIFESTYLE VARIABLES
HOW OFTEN DO YOU HAVE A DRINK CONTAINING ALCOHOL: 2-3 TIMES A WEEK
HOW OFTEN DO YOU HAVE SIX OR MORE DRINKS ON ONE OCCASION: NEVER
SKIP TO QUESTIONS 9-10: 1
HOW MANY STANDARD DRINKS CONTAINING ALCOHOL DO YOU HAVE ON A TYPICAL DAY: 1 OR 2
AUDIT-C TOTAL SCORE: 3

## 2023-11-07 SDOH — HEALTH STABILITY: MENTAL HEALTH
STRESS IS WHEN SOMEONE FEELS TENSE, NERVOUS, ANXIOUS, OR CAN'T SLEEP AT NIGHT BECAUSE THEIR MIND IS TROUBLED. HOW STRESSED ARE YOU?: RATHER MUCH

## 2023-11-07 SDOH — HEALTH STABILITY: PHYSICAL HEALTH: ON AVERAGE, HOW MANY MINUTES DO YOU ENGAGE IN EXERCISE AT THIS LEVEL?: 20 MIN

## 2023-11-07 SDOH — ECONOMIC STABILITY: TRANSPORTATION INSECURITY
IN THE PAST 12 MONTHS, HAS LACK OF RELIABLE TRANSPORTATION KEPT YOU FROM MEDICAL APPOINTMENTS, MEETINGS, WORK OR FROM GETTING THINGS NEEDED FOR DAILY LIVING?: YES

## 2023-11-07 SDOH — ECONOMIC STABILITY: HOUSING INSECURITY
IN THE LAST 12 MONTHS, WAS THERE A TIME WHEN YOU DID NOT HAVE A STEADY PLACE TO SLEEP OR SLEPT IN A SHELTER (INCLUDING NOW)?: NO

## 2023-11-07 SDOH — HEALTH STABILITY: PHYSICAL HEALTH: ON AVERAGE, HOW MANY DAYS PER WEEK DO YOU ENGAGE IN MODERATE TO STRENUOUS EXERCISE (LIKE A BRISK WALK)?: 2 DAYS

## 2023-11-07 SDOH — ECONOMIC STABILITY: HOUSING INSECURITY

## 2023-11-07 ASSESSMENT — SOCIAL DETERMINANTS OF HEALTH (SDOH)
HOW OFTEN DO YOU GET TOGETHER WITH FRIENDS OR RELATIVES?: NEVER
DO YOU BELONG TO ANY CLUBS OR ORGANIZATIONS SUCH AS CHURCH GROUPS UNIONS, FRATERNAL OR ATHLETIC GROUPS, OR SCHOOL GROUPS?: NO
HOW OFTEN DO YOU ATTENT MEETINGS OF THE CLUB OR ORGANIZATION YOU BELONG TO?: NEVER
HOW OFTEN DO YOU HAVE SIX OR MORE DRINKS ON ONE OCCASION: NEVER
HOW MANY DRINKS CONTAINING ALCOHOL DO YOU HAVE ON A TYPICAL DAY WHEN YOU ARE DRINKING: 1 OR 2
HOW HARD IS IT FOR YOU TO PAY FOR THE VERY BASICS LIKE FOOD, HOUSING, MEDICAL CARE, AND HEATING?: SOMEWHAT HARD
WITHIN THE PAST 12 MONTHS, YOU WORRIED THAT YOUR FOOD WOULD RUN OUT BEFORE YOU GOT THE MONEY TO BUY MORE: NEVER TRUE
HOW OFTEN DO YOU ATTEND CHURCH OR RELIGIOUS SERVICES?: NEVER
HOW OFTEN DO YOU HAVE A DRINK CONTAINING ALCOHOL: 2-3 TIMES A WEEK
IN A TYPICAL WEEK, HOW MANY TIMES DO YOU TALK ON THE PHONE WITH FAMILY, FRIENDS, OR NEIGHBORS?: ONCE A WEEK

## 2023-11-14 ENCOUNTER — OFFICE VISIT (OUTPATIENT)
Dept: MEDICAL GROUP | Facility: MEDICAL CENTER | Age: 58
End: 2023-11-14
Attending: FAMILY MEDICINE
Payer: MEDICAID

## 2023-11-14 VITALS
DIASTOLIC BLOOD PRESSURE: 80 MMHG | HEIGHT: 64 IN | BODY MASS INDEX: 28.17 KG/M2 | OXYGEN SATURATION: 96 % | RESPIRATION RATE: 16 BRPM | WEIGHT: 165 LBS | SYSTOLIC BLOOD PRESSURE: 122 MMHG | HEART RATE: 86 BPM | TEMPERATURE: 97.4 F

## 2023-11-14 DIAGNOSIS — L73.2 HIDRADENITIS SUPPURATIVA: ICD-10-CM

## 2023-11-14 DIAGNOSIS — K21.9 GASTROESOPHAGEAL REFLUX DISEASE WITHOUT ESOPHAGITIS: ICD-10-CM

## 2023-11-14 DIAGNOSIS — Z72.0 TOBACCO ABUSE: ICD-10-CM

## 2023-11-14 DIAGNOSIS — M54.42 CHRONIC MIDLINE LOW BACK PAIN WITH BILATERAL SCIATICA: ICD-10-CM

## 2023-11-14 DIAGNOSIS — Z23 NEED FOR VACCINATION: ICD-10-CM

## 2023-11-14 DIAGNOSIS — M54.41 CHRONIC MIDLINE LOW BACK PAIN WITH BILATERAL SCIATICA: ICD-10-CM

## 2023-11-14 DIAGNOSIS — Q80.1 ICHTHYOSIS, X-LINKED: ICD-10-CM

## 2023-11-14 DIAGNOSIS — N18.31 STAGE 3A CHRONIC KIDNEY DISEASE: ICD-10-CM

## 2023-11-14 DIAGNOSIS — K76.0 HEPATIC STEATOSIS: ICD-10-CM

## 2023-11-14 DIAGNOSIS — F31.62 BIPOLAR DISORDER, CURRENT EPISODE MIXED, MODERATE (HCC): ICD-10-CM

## 2023-11-14 DIAGNOSIS — I10 ESSENTIAL HYPERTENSION: ICD-10-CM

## 2023-11-14 DIAGNOSIS — E78.2 MIXED HYPERLIPIDEMIA: ICD-10-CM

## 2023-11-14 DIAGNOSIS — G89.29 CHRONIC MIDLINE LOW BACK PAIN WITH BILATERAL SCIATICA: ICD-10-CM

## 2023-11-14 DIAGNOSIS — E66.3 OVERWEIGHT (BMI 25.0-29.9): ICD-10-CM

## 2023-11-14 PROBLEM — R10.31 CHRONIC BILATERAL LOWER ABDOMINAL PAIN: Status: RESOLVED | Noted: 2021-08-04 | Resolved: 2023-11-14

## 2023-11-14 PROBLEM — K52.9 COLITIS: Status: RESOLVED | Noted: 2021-02-02 | Resolved: 2023-11-14

## 2023-11-14 PROBLEM — R10.32 CHRONIC BILATERAL LOWER ABDOMINAL PAIN: Status: RESOLVED | Noted: 2021-08-04 | Resolved: 2023-11-14

## 2023-11-14 PROCEDURE — 3079F DIAST BP 80-89 MM HG: CPT | Performed by: FAMILY MEDICINE

## 2023-11-14 PROCEDURE — 90471 IMMUNIZATION ADMIN: CPT

## 2023-11-14 PROCEDURE — 99213 OFFICE O/P EST LOW 20 MIN: CPT | Mod: 25 | Performed by: FAMILY MEDICINE

## 2023-11-14 PROCEDURE — 99205 OFFICE O/P NEW HI 60 MIN: CPT | Performed by: FAMILY MEDICINE

## 2023-11-14 PROCEDURE — 3074F SYST BP LT 130 MM HG: CPT | Performed by: FAMILY MEDICINE

## 2023-11-14 RX ORDER — ROSUVASTATIN CALCIUM 10 MG/1
10 TABLET, COATED ORAL EVERY EVENING
Qty: 90 TABLET | Refills: 1 | Status: SHIPPED | OUTPATIENT
Start: 2023-11-14

## 2023-11-14 RX ORDER — GABAPENTIN 300 MG/1
300 CAPSULE ORAL 3 TIMES DAILY
Qty: 180 CAPSULE | Refills: 1 | Status: SHIPPED | OUTPATIENT
Start: 2023-11-14

## 2023-11-14 RX ORDER — PANTOPRAZOLE SODIUM 40 MG/1
40 TABLET, DELAYED RELEASE ORAL
Qty: 90 TABLET | Refills: 1 | Status: SHIPPED | OUTPATIENT
Start: 2023-11-14

## 2023-11-14 RX ORDER — DIVALPROEX SODIUM 250 MG/1
500 TABLET, DELAYED RELEASE ORAL 2 TIMES DAILY
Qty: 360 TABLET | Refills: 1 | Status: SHIPPED | OUTPATIENT
Start: 2023-11-14

## 2023-11-14 RX ORDER — BUPROPION HYDROCHLORIDE 300 MG/1
300 TABLET ORAL
Qty: 90 TABLET | Refills: 1 | Status: SHIPPED | OUTPATIENT
Start: 2023-11-14

## 2023-11-14 RX ORDER — CLINDAMYCIN PHOSPHATE 10 MG/G
GEL TOPICAL
Qty: 75 ML | Refills: 2 | Status: SHIPPED | OUTPATIENT
Start: 2023-11-14

## 2023-11-14 RX ORDER — LISINOPRIL 30 MG/1
30 TABLET ORAL
Qty: 90 TABLET | Refills: 1 | Status: SHIPPED | OUTPATIENT
Start: 2023-11-14

## 2023-11-14 RX ORDER — AMLODIPINE BESYLATE 5 MG/1
5 TABLET ORAL DAILY
Qty: 90 TABLET | Refills: 1 | Status: SHIPPED | OUTPATIENT
Start: 2023-11-14

## 2023-11-14 ASSESSMENT — FIBROSIS 4 INDEX: FIB4 SCORE: 1.7

## 2023-11-14 NOTE — ASSESSMENT & PLAN NOTE
Patient reports that he is having recurrence in the groin bilaterally. He notes that there is drainage of foul smelling purulence. He takes a bleach bath when it occurs but has not been helping.  He was given tetracycline in 02/23, which he reports helped him.

## 2023-11-14 NOTE — LETTER
[facility]  Hillary Damon M.D.  [address]  [place] [address]  Fax: [phone]   Authorization for Release/Disclosure of   Protected Health Information   Name: EMILY CHIANG : 1965 SSN: [id number]   Address: [address] [place] [address]   [address] [place] [id number] Phone:    [phone] (home)    I authorize the entity listed below to release/disclose the PHI below to:   [facility]/Hillary Damon M.D. and Hillary Damon M.D.   Provider or Entity Name:  [facility]   Address   City, State, [facility]   Phone:      Fax:     Reason for request: continuity of care   Information to be released:    [  ] LAST COLONOSCOPY,  including any PATH REPORT and follow-up  [  ] LAST FIT/COLOGUARD RESULT [  ] LAST DEXA  [  ] LAST MAMMOGRAM  [  ] LAST PAP  [  ] LAST LABS [  ] RETINA EXAM REPORT  [  ] IMMUNIZATION RECORDS  [ X ] Release all info      [  ] Check here and initial the line next to each item to release ALL health information INCLUDING  _____ Care and treatment for drug and / or alcohol abuse  _____ HIV testing, infection status, or AIDS  _____ Genetic Testing    DATES OF SERVICE OR TIME PERIOD TO BE DISCLOSED: _____________  I understand and acknowledge that:  * This Authorization may be revoked at any time by you in writing, except if your health information has already been used or disclosed.  * Your health information that will be used or disclosed as a result of you signing this authorization could be re-disclosed by the recipient. If this occurs, your re-disclosed health information may no longer be protected by State or Federal laws.  * You may refuse to sign this Authorization. Your refusal will not affect your ability to obtain treatment.  * This Authorization becomes effective upon signing and will  on (date) __________.      If no date is indicated, this Authorization will  one (1) year from the signature date.    Name: Emily Chiang  Signature: Date:   2023     PLEASE FAX REQUESTED  RECORDS BACK TO: [phone]

## 2023-11-14 NOTE — PROGRESS NOTES
Subjective:     CC:    Chief Complaint   Patient presents with    Establish Care     Medication refills       HISTORY OF THE PRESENT ILLNESS: Patient is a 57 y.o. male. This pleasant patient is here today to establish care and discuss the following issues.   His/her prior PCP was STEPHANIE Hendrix.    Specialists -   - [facility] dermat[facility]       Hidradenitis suppurativa  Patient reports that he is having recurrence in the groin bilaterally. He notes that there is drainage of foul smelling purulence. He takes a bleach bath when it occurs but has not been helping.  He was given tetracycline in 02/23, which he reports helped him.     Ichthyosis, X-linked  Patient has history of being followed up by [facility] dermatology.  The past frequent surveillance is as he is very high risk of develop skin cancer.  He has had multiple surgeries, at most recently had Mohs surgery done earlier this year.  Last time he was at [facility] was in June.  He has not had follow-up since then.  He does not have a follow-up appointment with them    Stage 3a chronic kidney disease (HCC)  This was resolved on patient's last labs done almost 2 years ago.    Bipolar affective (HCC)  Patient is well managed on bupropion 300 mg XL, Depakote 500 mg twice daily.  He cannot tolerate the 500 mg tablets as they are too big.  He has not see psychiatry in some time.  He reports controlled on this medication combination    Tobacco abuse  Currently at 1/2 ppd          Allergies: Sulfamethoxazole, Abilify, Risperidone, and Omeprazole    Current Outpatient Medications Ordered in Epic   Medication Sig Dispense Refill    amLODIPine (NORVASC) 5 MG Tab Take 1 Tablet by mouth every day. Indications: High Blood Pressure Disorder 90 Tablet 1    buPROPion (WELLBUTRIN XL) 300 MG XL tablet Take 1 Tablet by mouth every day. 90 Tablet 1    divalproex (DEPAKOTE) 250 MG Tablet Delayed Response Take 2 Tablets by mouth 2 times a day. 360 Tablet 1    gabapentin (NEURONTIN) 300 MG Cap  Take 1 Capsule by mouth 3 times a day. 180 Capsule 1    lisinopril (PRINIVIL) 30 MG tablet Take 1 Tablet by mouth every day. 90 Tablet 1    pantoprazole (PROTONIX) 40 MG Tablet Delayed Response Take 1 Tablet by mouth every day. 90 Tablet 1    rosuvastatin (CRESTOR) 10 MG Tab Take 1 Tablet by mouth every evening. Indications: Disease of the Heart or Blood Vessels 90 Tablet 1    clindamycin 1 % Gel Use finger tip amount on affected area twice a day until resolution. Contact physician if no benefit after 2 weeks 75 mL 2    calcium carbonate (TUMS) 500 MG Chew Tab Chew 500-1,500 mg every day.       No current [facility]-ordered facility-administered medications on file.       Past Medical History:   Diagnosis Date    MADDISON (acute kidney injury) ([facility]) 4/2/2016    Allergy     Anxiety     Cancer ([facility])     Cataract 2012    bilateral removal    Chronic midline low back pain with bilateral sciatica     COVID-19 virus infection 1/15/2022    Dehydration 4/2/2016    Depression     Diarrhea 4/2/2016    GERD (gastroesophageal reflux disease)     Hyperlipidemia     Hypertension     IBD (inflammatory bowel disease)     diarrhea    Kidney disease     Substance abuse ([facility])     Crack - clean since 2004       Past Surgical History:   Procedure Laterality Date    EYE SURGERY Bilateral 2012    cataracts    OTHER      mohs surgery earlier this year with luxe    OTHER SURGICAL PROCEDURE Bilateral     BCCA skin multiple cites       Social History     Tobacco Use    Smoking status: Every Day     Current packs/day: 0.50     Average packs/day: 0.5 packs/day for 40.0 years (20.0 ttl pk-yrs)     Types: Cigarettes    Smokeless tobacco: Never    Tobacco comments:     Max was 4 PPD for 5 years   Vaping Use    Vaping Use: Some days    Start date: 2/9/2021    Substances: THC    Devices: Disposable   Substance Use Topics    Alcohol use: Yes     Alcohol/week: 8.4 oz     Types: 14 Glasses of wine per week     Comment: 40 years of alcohol abuse, currently just 2  "glasses wine most nights    Drug use: Yes     Types: Marijuana, Inhaled     Comment: thc vape       Social History     Social History Narrative    Not on file       Family History   Problem Relation Age of Onset    Hypertension Mother     Cancer Father         Nodular lymphoma    Arthritis Brother     Dementia Maternal Grandmother     Stroke Maternal Grandmother     Cancer Maternal Grandfather         lung    Heart Attack Paternal Grandmother     Heart Disease Paternal Grandmother     Hypertension Paternal Grandmother     Hyperlipidemia Paternal Grandmother     No Known Problems Paternal Grandfather     No Known Problems Brother     Diabetes Neg Hx     Kidney Disease Neg Hx        ROS:   Pulm: no sob, no cough  CV: no chest pain, no lower extremity edema  GI: no abd pain, hematochezia, melena          Objective:     Exam: /80 (BP Location: Left arm, Patient Position: Sitting)   Pulse 86   Temp 36.3 °C (97.4 °F) (Temporal)   Resp 16   Ht 1.626 m (5' 4\")   Wt 74.8 kg (165 lb)   SpO2 96%  Body mass index is 28.32 kg/m².    General: Normal appearing. No distress.  Neck: Supple. Thyroid is not enlarged.  Pulmonary: Clear to ausculation.  Normal effort. No rales, ronchi, or wheezing.  Cardiovascular: Regular rate and rhythm without murmur.   Abdomen: Soft, nontender, nondistended. Normal bowel sounds.  Skin: Patient's skin is overall dry and scaly.  In the groin region, skin is erythematous in the bases with slight flaking of the skin.  He is tender.  No sinus tracts palpated today.  There is no drainage today.  Musculoskeletal: Normal gait. No extremity cyanosis, clubbing, or edema.  Psych: Normal mood and affect.      Labs:   Reviewed multiple labs from 2022, dermatology notes from 2021 reviewed colonoscopy results from 4/14/2020    Assessment & Plan:   57 y.o. male with the following -    1. Hidradenitis suppurativa  - clindamycin 1 % Gel; Use finger tip amount on affected area twice a day until resolution. " Contact physician if no benefit after 2 weeks  Dispense: 75 mL; Refill: 2  On exam, patient today does not present like classic hidradenitis suppurativa precise location.  Historically based on past notes, he has had sinus taxable.  Patient also reports some intermittent drainage of purulent, material he reports this happened earlier this year and was given a course of tetracycline, which did help.  He is already taking intermittent bleach baths, which she states does not help at all.  We will trial topical clindamycin gel.  We will follow-up again relatively soon, if he does not have any improvement then we will switch over to oral antibiotics.  He should also have this evaluated by dermatology    2. Essential hypertension  - amLODIPine (NORVASC) 5 MG Tab; Take 1 Tablet by mouth every day. Indications: High Blood Pressure Disorder  Dispense: 90 Tablet; Refill: 1  - lisinopril (PRINIVIL) 30 MG tablet; Take 1 Tablet by mouth every day.  Dispense: 90 Tablet; Refill: 1  - CBC WITHOUT DIFFERENTIAL; Future  - Lipid Profile; Future  Medication refill    3. Bipolar disorder, current episode mixed, moderate (HCC)  - buPROPion (WELLBUTRIN XL) 300 MG XL tablet; Take 1 Tablet by mouth every day.  Dispense: 90 Tablet; Refill: 1  - divalproex (DEPAKOTE) 250 MG Tablet Delayed Response; Take 2 Tablets by mouth 2 times a day.  Dispense: 360 Tablet; Refill: 1  Patient is well controlled.  We will continue for now, may need psychiatric evaluation in the future    4. Gastroesophageal reflux disease without esophagitis  - pantoprazole (PROTONIX) 40 MG Tablet Delayed Response; Take 1 Tablet by mouth every day.  Dispense: 90 Tablet; Refill: 1    5. Mixed hyperlipidemia  - rosuvastatin (CRESTOR) 10 MG Tab; Take 1 Tablet by mouth every evening. Indications: Disease of the Heart or Blood Vessels  Dispense: 90 Tablet; Refill: 1    6. Chronic midline low back pain with bilateral sciatica  - gabapentin (NEURONTIN) 300 MG Cap; Take 1 Capsule by  mouth 3 times a day.  Dispense: 180 Capsule; Refill: 1    7. Need for vaccination  - Influenza Vaccine Quad Injection (PF)    8. Hepatic steatosis  - Comp Metabolic Panel; Future    9. Overweight (BMI 25.0-29.9)  - Lipid Profile; Future  - HEMOGLOBIN A1C; Future    10. Ichthyosis, X-linked    11. Stage 3a chronic kidney disease (HCC)    12. Tobacco abuse    My total time spent caring for the patient on the day of the encounter was 63 minutes.   This does not include time spent on separately billable procedures/tests.      No follow-ups on file.    Please note that this dictation was created using voice recognition software. I have made every reasonable attempt to correct obvious errors, but I expect that there are errors of grammar and possibly content that I did not discover before finalizing the note.

## 2023-11-15 ENCOUNTER — TELEPHONE (OUTPATIENT)
Dept: MEDICAL GROUP | Facility: MEDICAL CENTER | Age: 58
End: 2023-11-15

## 2023-11-15 NOTE — TELEPHONE ENCOUNTER
DOCUMENTATION OF PAR STATUS:    1. Name of Medication & Dose:  clindamycin 1 % Gel     2. Name of Prescription Coverage Company & phone #: medicaid ffs.    3. Date Prior Auth Submitted: 11/15/23    4. What information was given to obtain insurance decision? Chart notes,icd-10 code, med hx.    5. Prior Auth Status? Pending    6. Patient Notified: N\A

## 2023-11-15 NOTE — ASSESSMENT & PLAN NOTE
Patient has history of being followed up by [facility] dermatology.  The past frequent surveillance is as he is very high risk of develop skin cancer.  He has had multiple surgeries, at most recently had Mohs surgery done earlier this year.  Last time he was at [facility] was in June.  He has not had follow-up since then.  He does not have a follow-up appointment with them

## 2023-11-15 NOTE — ASSESSMENT & PLAN NOTE
Patient is well managed on bupropion 300 mg XL, Depakote 500 mg twice daily.  He cannot tolerate the 500 mg tablets as they are too big.  He has not see psychiatry in some time.  He reports controlled on this medication combination

## 2023-12-13 ENCOUNTER — HOSPITAL ENCOUNTER (OUTPATIENT)
Dept: LAB | Facility: MEDICAL CENTER | Age: 58
End: 2023-12-13
Attending: FAMILY MEDICINE
Payer: MEDICAID

## 2023-12-13 DIAGNOSIS — E66.3 OVERWEIGHT (BMI 25.0-29.9): ICD-10-CM

## 2023-12-13 DIAGNOSIS — K76.0 HEPATIC STEATOSIS: ICD-10-CM

## 2023-12-13 DIAGNOSIS — I10 ESSENTIAL HYPERTENSION: ICD-10-CM

## 2023-12-13 LAB
ERYTHROCYTE [DISTWIDTH] IN BLOOD BY AUTOMATED COUNT: 49.2 FL (ref 35.9–50)
EST. AVERAGE GLUCOSE BLD GHB EST-MCNC: 97 MG/DL
HBA1C MFR BLD: 5 % (ref 4–5.6)
HCT VFR BLD AUTO: 41.1 % (ref 42–52)
HGB BLD-MCNC: 14.4 G/DL (ref 14–18)
MCH RBC QN AUTO: 36.3 PG (ref 27–33)
MCHC RBC AUTO-ENTMCNC: 35 G/DL (ref 32.3–36.5)
MCV RBC AUTO: 103.5 FL (ref 81.4–97.8)
PLATELET # BLD AUTO: 284 K/UL (ref 164–446)
PMV BLD AUTO: 10.5 FL (ref 9–12.9)
RBC # BLD AUTO: 3.97 M/UL (ref 4.7–6.1)
WBC # BLD AUTO: 10.5 K/UL (ref 4.8–10.8)

## 2023-12-13 PROCEDURE — 36415 COLL VENOUS BLD VENIPUNCTURE: CPT

## 2023-12-13 PROCEDURE — 85027 COMPLETE CBC AUTOMATED: CPT

## 2023-12-13 PROCEDURE — 83036 HEMOGLOBIN GLYCOSYLATED A1C: CPT

## 2023-12-13 PROCEDURE — 80061 LIPID PANEL: CPT

## 2023-12-13 PROCEDURE — 80053 COMPREHEN METABOLIC PANEL: CPT

## 2023-12-14 DIAGNOSIS — D75.89 MACROCYTOSIS WITHOUT ANEMIA: ICD-10-CM

## 2023-12-14 LAB
ALBUMIN SERPL BCP-MCNC: 4.3 G/DL (ref 3.2–4.9)
ALBUMIN/GLOB SERPL: 1.8 G/DL
ALP SERPL-CCNC: 69 U/L (ref 30–99)
ALT SERPL-CCNC: 21 U/L (ref 2–50)
ANION GAP SERPL CALC-SCNC: 14 MMOL/L (ref 7–16)
AST SERPL-CCNC: 30 U/L (ref 12–45)
BILIRUB SERPL-MCNC: 0.4 MG/DL (ref 0.1–1.5)
BUN SERPL-MCNC: 14 MG/DL (ref 8–22)
CALCIUM ALBUM COR SERPL-MCNC: 8.5 MG/DL (ref 8.5–10.5)
CALCIUM SERPL-MCNC: 8.7 MG/DL (ref 8.5–10.5)
CHLORIDE SERPL-SCNC: 99 MMOL/L (ref 96–112)
CHOLEST SERPL-MCNC: 140 MG/DL (ref 100–199)
CO2 SERPL-SCNC: 23 MMOL/L (ref 20–33)
CREAT SERPL-MCNC: 1.19 MG/DL (ref 0.5–1.4)
GFR SERPLBLD CREATININE-BSD FMLA CKD-EPI: 72 ML/MIN/1.73 M 2
GLOBULIN SER CALC-MCNC: 2.4 G/DL (ref 1.9–3.5)
GLUCOSE SERPL-MCNC: 79 MG/DL (ref 65–99)
HDLC SERPL-MCNC: 70 MG/DL
LDLC SERPL CALC-MCNC: 56 MG/DL
POTASSIUM SERPL-SCNC: 4.3 MMOL/L (ref 3.6–5.5)
PROT SERPL-MCNC: 6.7 G/DL (ref 6–8.2)
SODIUM SERPL-SCNC: 136 MMOL/L (ref 135–145)
TRIGL SERPL-MCNC: 71 MG/DL (ref 0–149)

## 2023-12-14 NOTE — RESULT ENCOUNTER NOTE
Please advise pt that labs show the following - kdiney, liver function are normal, electrolytes normal. Lipids are great. No diabetes    He is borderline anemic. I have ordered additional labs to be done fasting. Please schedule follow up to review. Virtual is ok if preferred

## 2024-02-06 ENCOUNTER — OFFICE VISIT (OUTPATIENT)
Dept: MEDICAL GROUP | Facility: MEDICAL CENTER | Age: 59
End: 2024-02-06
Attending: FAMILY MEDICINE
Payer: MEDICAID

## 2024-02-06 VITALS
HEART RATE: 96 BPM | BODY MASS INDEX: 26.8 KG/M2 | TEMPERATURE: 98 F | RESPIRATION RATE: 16 BRPM | OXYGEN SATURATION: 98 % | WEIGHT: 157 LBS | DIASTOLIC BLOOD PRESSURE: 80 MMHG | HEIGHT: 64 IN | SYSTOLIC BLOOD PRESSURE: 100 MMHG

## 2024-02-06 DIAGNOSIS — R05.9 COUGH, UNSPECIFIED TYPE: ICD-10-CM

## 2024-02-06 LAB
FLUAV RNA SPEC QL NAA+PROBE: NEGATIVE
FLUBV RNA SPEC QL NAA+PROBE: NEGATIVE
RSV RNA SPEC QL NAA+PROBE: NEGATIVE
SARS-COV-2 RNA RESP QL NAA+PROBE: NEGATIVE

## 2024-02-06 PROCEDURE — 3079F DIAST BP 80-89 MM HG: CPT | Performed by: FAMILY MEDICINE

## 2024-02-06 PROCEDURE — 94640 AIRWAY INHALATION TREATMENT: CPT | Mod: 25 | Performed by: FAMILY MEDICINE

## 2024-02-06 PROCEDURE — 99213 OFFICE O/P EST LOW 20 MIN: CPT | Performed by: FAMILY MEDICINE

## 2024-02-06 PROCEDURE — 99214 OFFICE O/P EST MOD 30 MIN: CPT | Performed by: FAMILY MEDICINE

## 2024-02-06 PROCEDURE — 3074F SYST BP LT 130 MM HG: CPT | Performed by: FAMILY MEDICINE

## 2024-02-06 PROCEDURE — 0241U POCT CEPHEID COV-2, FLU A/B, RSV - PCR: CPT | Performed by: FAMILY MEDICINE

## 2024-02-06 PROCEDURE — 700101 HCHG RX REV CODE 250: Mod: UD | Performed by: FAMILY MEDICINE

## 2024-02-06 RX ORDER — ALBUTEROL SULFATE 90 UG/1
2 AEROSOL, METERED RESPIRATORY (INHALATION) EVERY 6 HOURS PRN
Qty: 8.5 G | Refills: 5 | Status: SHIPPED | OUTPATIENT
Start: 2024-02-06

## 2024-02-06 RX ORDER — IPRATROPIUM BROMIDE AND ALBUTEROL SULFATE 2.5; .5 MG/3ML; MG/3ML
3 SOLUTION RESPIRATORY (INHALATION) ONCE
Status: COMPLETED | OUTPATIENT
Start: 2024-02-06 | End: 2024-02-06

## 2024-02-06 RX ADMIN — IPRATROPIUM BROMIDE AND ALBUTEROL SULFATE 3 ML: 2.5; .5 SOLUTION RESPIRATORY (INHALATION) at 14:46

## 2024-02-06 ASSESSMENT — FIBROSIS 4 INDEX: FIB4 SCORE: 1.29

## 2024-02-06 NOTE — PROGRESS NOTES
"Subjective:     CC:   Chief Complaint   Patient presents with    Cough     X3 months    Hemorrhoids         HPI:     Pt has not yet completed labs.     Cough  Pt reports that he has had a cough since the day after thanksgiving and feeling \"fluish\"  Reports lasts for 3-4 days at a time with a \"light fever\" (achiness). Coughing up clear mucous. Comes and goes. He reports a little wheezing sometimes in the morning. No SOB. No inhalers  Reports intermittent sharp left sided chest pain at rest that lasts for 1/2 a second.   Reports that it \"starts in my back\" as he has also had some increasing pain in the back   He has surgery upcoming on 2/19/24 for basal cell carcinoma   He is smoking 4-5 cigarettes per day   Hasn't been around anyone ill but reports he was at a truck stop on thanksgiving day and may have been exposed to something there at the time.     Past Medical History:   Diagnosis Date    MADDISON (acute kidney injury) (HCC) 4/2/2016    Allergy     Anxiety     Cancer (HCC)     Cataract 2012    bilateral removal    Chronic midline low back pain with bilateral sciatica     COVID-19 virus infection 1/15/2022    Dehydration 4/2/2016    Depression     Diarrhea 4/2/2016    GERD (gastroesophageal reflux disease)     Hyperlipidemia     Hypertension     IBD (inflammatory bowel disease)     diarrhea    Kidney disease     Substance abuse (HCC)     Crack - clean since 2004       Social History     Tobacco Use    Smoking status: Every Day     Current packs/day: 0.50     Average packs/day: 0.5 packs/day for 40.0 years (20.0 ttl pk-yrs)     Types: Cigarettes    Smokeless tobacco: Never    Tobacco comments:     Max was 4 PPD for 5 years   Vaping Use    Vaping Use: Some days    Start date: 2/9/2021    Substances: THC    Devices: Disposable   Substance Use Topics    Alcohol use: Yes     Alcohol/week: 8.4 oz     Types: 14 Glasses of wine per week     Comment: 40 years of alcohol abuse, currently just 2 glasses wine most nights    Drug " "use: Yes     Types: Marijuana, Inhaled     Comment: thc vape       Current Outpatient Medications Ordered in Epic   Medication Sig Dispense Refill    albuterol 108 (90 Base) MCG/ACT Aero Soln inhalation aerosol Inhale 2 Puffs every 6 hours as needed for Shortness of Breath. 8.5 g 5    amLODIPine (NORVASC) 5 MG Tab Take 1 Tablet by mouth every day. Indications: High Blood Pressure Disorder 90 Tablet 1    buPROPion (WELLBUTRIN XL) 300 MG XL tablet Take 1 Tablet by mouth every day. 90 Tablet 1    divalproex (DEPAKOTE) 250 MG Tablet Delayed Response Take 2 Tablets by mouth 2 times a day. 360 Tablet 1    gabapentin (NEURONTIN) 300 MG Cap Take 1 Capsule by mouth 3 times a day. 180 Capsule 1    lisinopril (PRINIVIL) 30 MG tablet Take 1 Tablet by mouth every day. 90 Tablet 1    pantoprazole (PROTONIX) 40 MG Tablet Delayed Response Take 1 Tablet by mouth every day. 90 Tablet 1    rosuvastatin (CRESTOR) 10 MG Tab Take 1 Tablet by mouth every evening. Indications: Disease of the Heart or Blood Vessels 90 Tablet 1    clindamycin 1 % Gel Use finger tip amount on affected area twice a day until resolution. Contact physician if no benefit after 2 weeks 75 mL 2    calcium carbonate (TUMS) 500 MG Chew Tab Chew 500-1,500 mg every day.       No current [facility]-ordered facility-administered medications on file.       Allergies:  Sulfamethoxazole, Abilify, Risperidone, and Omeprazole        Objective:       Exam:  /80 (BP Location: Left arm, Patient Position: Sitting)   Pulse 96   Temp 36.7 °C (98 °F) (Temporal)   Resp 16   Ht 1.626 m (5' 4\")   Wt 71.2 kg (157 lb)   SpO2 98%   BMI 26.95 kg/m²  Body mass index is 26.95 kg/m².    General: Normal appearing. No distress.  ENT: Ears normal shape and contour, canals are clear bilaterally, Tms are dull b/l, murky fluid behind them, no bulging, erythema, oropharynx is without erythema, edema or exudates.   Neck: Supple. Thyroid is not enlarged.  Pulmonary: Clear to ausculation.  " Normal effort. No rales, ronchi, or wheezing. Decreased breath sounds b/l, improved with duoneb in office  Cardiovascular: Regular rate and rhythm without murmur.   Psych: Normal mood and affect.       Data reviewed:   POC flu, covid, rsv - negative    Assessment & Plan:     58 y.o. male with the following -     1. Cough, unspecified type  - ipratropium-albuterol (DUONEB) nebulizer solution  - POCT CoV-2, Flu A/B, RSV by PCR  - PULMONARY FUNCTION TESTS -Test requested: Complete Pulmonary Function Test; Future  - albuterol 108 (90 Base) MCG/ACT Aero Soln inhalation aerosol; Inhale 2 Puffs every 6 hours as needed for Shortness of Breath.  Dispense: 8.5 g; Refill: 5  Patient has significant smoking history - he has thankfully decreased but unknown if he has anything underlying. Decreased lung sounds, so possible obstructive process. May be mildly exacerbated since November, so will try albuterol PRN - lung sounds were improved after duoneb.   PFTs, albuterol to pharmacy  Viral testing as he has surgery coming up - this is negative.       No follow-ups on file.    Please note that this dictation was created using voice recognition software. I have made every reasonable attempt to correct obvious errors, but I expect that there are errors of grammar and possibly content that I did not discover before finalizing the note.

## 2024-02-06 NOTE — ASSESSMENT & PLAN NOTE
"Pt reports that he has had a cough since the day after thanksgiving and feeling \"fluish\"  Reports lasts for 3-4 days at a time with a \"light fever\" (achiness). Coughing up clear mucous. Comes and goes. He reports a little wheezing sometimes in the morning. No SOB. No inhalers  Reports intermittent sharp left sided chest pain at rest that lasts for 1/2 a second.   Reports that it \"starts in my back\" as he has also had some increasing pain in the back   He has surgery upcoming on 2/19/24 for basal cell carcinoma   He is smoking 4-5 cigarettes per day   Hasn't been around anyone ill but reports he was at a truck stop on thanksgiving day and may have been exposed to something there at the time.   "

## 2024-02-22 ENCOUNTER — TELEPHONE (OUTPATIENT)
Dept: HEALTH INFORMATION MANAGEMENT | Facility: OTHER | Age: 59
End: 2024-02-22
Payer: MEDICAID

## 2024-03-05 ENCOUNTER — NON-PROVIDER VISIT (OUTPATIENT)
Dept: SLEEP MEDICINE | Facility: MEDICAL CENTER | Age: 59
End: 2024-03-05
Attending: FAMILY MEDICINE
Payer: MEDICAID

## 2024-03-05 ENCOUNTER — HOSPITAL ENCOUNTER (OUTPATIENT)
Dept: LAB | Facility: MEDICAL CENTER | Age: 59
End: 2024-03-05
Attending: FAMILY MEDICINE
Payer: MEDICAID

## 2024-03-05 VITALS — WEIGHT: 157 LBS | HEIGHT: 64 IN | BODY MASS INDEX: 26.8 KG/M2

## 2024-03-05 DIAGNOSIS — D75.89 MACROCYTOSIS WITHOUT ANEMIA: ICD-10-CM

## 2024-03-05 DIAGNOSIS — R05.9 COUGH, UNSPECIFIED TYPE: ICD-10-CM

## 2024-03-05 LAB
ERYTHROCYTE [DISTWIDTH] IN BLOOD BY AUTOMATED COUNT: 55.2 FL (ref 35.9–50)
FERRITIN SERPL-MCNC: 149 NG/ML (ref 22–322)
FOLATE SERPL-MCNC: <2 NG/ML
HCT VFR BLD AUTO: 41.8 % (ref 42–52)
HGB BLD-MCNC: 14.4 G/DL (ref 14–18)
IRON SATN MFR SERPL: 39 % (ref 15–55)
IRON SERPL-MCNC: 148 UG/DL (ref 50–180)
MCH RBC QN AUTO: 35.8 PG (ref 27–33)
MCHC RBC AUTO-ENTMCNC: 34.4 G/DL (ref 32.3–36.5)
MCV RBC AUTO: 104 FL (ref 81.4–97.8)
PLATELET # BLD AUTO: 240 K/UL (ref 164–446)
PMV BLD AUTO: 11 FL (ref 9–12.9)
RBC # BLD AUTO: 4.02 M/UL (ref 4.7–6.1)
TIBC SERPL-MCNC: 379 UG/DL (ref 250–450)
UIBC SERPL-MCNC: 231 UG/DL (ref 110–370)
VIT B12 SERPL-MCNC: 385 PG/ML (ref 211–911)
WBC # BLD AUTO: 8.6 K/UL (ref 4.8–10.8)

## 2024-03-05 PROCEDURE — 94010 BREATHING CAPACITY TEST: CPT | Mod: 26 | Performed by: INTERNAL MEDICINE

## 2024-03-05 PROCEDURE — 82746 ASSAY OF FOLIC ACID SERUM: CPT

## 2024-03-05 PROCEDURE — 94010 BREATHING CAPACITY TEST: CPT | Performed by: FAMILY MEDICINE

## 2024-03-05 PROCEDURE — 94726 PLETHYSMOGRAPHY LUNG VOLUMES: CPT | Performed by: FAMILY MEDICINE

## 2024-03-05 PROCEDURE — 36415 COLL VENOUS BLD VENIPUNCTURE: CPT

## 2024-03-05 PROCEDURE — 82607 VITAMIN B-12: CPT

## 2024-03-05 PROCEDURE — 83550 IRON BINDING TEST: CPT

## 2024-03-05 PROCEDURE — 83540 ASSAY OF IRON: CPT

## 2024-03-05 PROCEDURE — 94729 DIFFUSING CAPACITY: CPT | Performed by: FAMILY MEDICINE

## 2024-03-05 PROCEDURE — 94729 DIFFUSING CAPACITY: CPT | Mod: 26 | Performed by: INTERNAL MEDICINE

## 2024-03-05 PROCEDURE — 85027 COMPLETE CBC AUTOMATED: CPT

## 2024-03-05 PROCEDURE — 94726 PLETHYSMOGRAPHY LUNG VOLUMES: CPT | Mod: 26 | Performed by: INTERNAL MEDICINE

## 2024-03-05 PROCEDURE — 82728 ASSAY OF FERRITIN: CPT

## 2024-03-05 ASSESSMENT — FIBROSIS 4 INDEX: FIB4 SCORE: 1.29

## 2024-03-05 ASSESSMENT — PULMONARY FUNCTION TESTS
FVC_PERCENT_PREDICTED: 109
FEV1: 2.62
FVC: 4.22
FEV1/FVC_PERCENT_PREDICTED: 79
FEV1/FVC: 62
FEV1/FVC_PREDICTED: 79
FVC_PREDICTED: 3.85
FEV1/FVC_PERCENT_PREDICTED: 78
FVC_LLN: 3.22
FEV1/FVC: 62
FEV1_PERCENT_PREDICTED: 85
FEV1/FVC_PERCENT_LLN: 66
FEV1/FVC_PERCENT_PREDICTED: 78
FEV1_PREDICTED: 3.05
FEV1_LLN: 2.55

## 2024-03-05 NOTE — PROCEDURES
Technician: Lorri Garrett RRT, CPFT  Good patient effort & cooperation.  Back extrap on FVC.  The results of this test meet the ATS/ERS standards for acceptability & reproducibility.  Test was performed on the [employer] Plethysmograph-Elite DX system.  Predicted equations for Spirometry are GLI-2012, ITS for lung volumes, and GLI-2017 for DLCO.  The DLCO was uncorrected for Hgb.      Interpretation  1.  There is mild obstruction  2.  The patient could not tolerate bronchodilator therapy  3.  RV is elevated to 154% consistent with air trapping  4.  DLCO is normal  5.  Flow volume loop appears to have poor initial effort in the first 1 seconds, but does appear obstructed    Mild obstruction with air trapping.  Unable to test bronchodilator response.  DLCO is normal.   These PFTs could be consistent with a diagnosis of asthma or COPD, bronchodilator response testing is important in differentiating in this case    Grisel Herman DO   Pulmonary and Critical Care

## 2024-03-12 DIAGNOSIS — E53.8 FOLATE DEFICIENCY: ICD-10-CM

## 2024-03-12 RX ORDER — FOLIC ACID 1 MG/1
1 TABLET ORAL DAILY
Qty: 30 TABLET | Refills: 3 | Status: SHIPPED | OUTPATIENT
Start: 2024-03-12

## 2024-04-10 ENCOUNTER — OFFICE VISIT (OUTPATIENT)
Dept: MEDICAL GROUP | Facility: MEDICAL CENTER | Age: 59
End: 2024-04-10
Attending: FAMILY MEDICINE
Payer: MEDICAID

## 2024-04-10 VITALS
OXYGEN SATURATION: 98 % | RESPIRATION RATE: 14 BRPM | WEIGHT: 154 LBS | SYSTOLIC BLOOD PRESSURE: 94 MMHG | BODY MASS INDEX: 26.29 KG/M2 | TEMPERATURE: 98 F | HEIGHT: 64 IN | DIASTOLIC BLOOD PRESSURE: 64 MMHG | HEART RATE: 76 BPM

## 2024-04-10 DIAGNOSIS — E53.8 FOLATE DEFICIENCY: ICD-10-CM

## 2024-04-10 DIAGNOSIS — Z23 NEED FOR VACCINATION: ICD-10-CM

## 2024-04-10 DIAGNOSIS — J41.0 SIMPLE CHRONIC BRONCHITIS (HCC): ICD-10-CM

## 2024-04-10 PROBLEM — J42 CHRONIC BRONCHITIS (HCC): Status: ACTIVE | Noted: 2024-02-06

## 2024-04-10 PROCEDURE — 99214 OFFICE O/P EST MOD 30 MIN: CPT | Performed by: FAMILY MEDICINE

## 2024-04-10 PROCEDURE — 3078F DIAST BP <80 MM HG: CPT | Performed by: FAMILY MEDICINE

## 2024-04-10 PROCEDURE — 3074F SYST BP LT 130 MM HG: CPT | Performed by: FAMILY MEDICINE

## 2024-04-10 PROCEDURE — 99212 OFFICE O/P EST SF 10 MIN: CPT | Performed by: FAMILY MEDICINE

## 2024-04-10 RX ORDER — BUDESONIDE AND FORMOTEROL FUMARATE DIHYDRATE 80; 4.5 UG/1; UG/1
2 AEROSOL RESPIRATORY (INHALATION) 2 TIMES DAILY
Qty: 10.2 G | Refills: 2 | Status: SHIPPED | OUTPATIENT
Start: 2024-04-10

## 2024-04-10 ASSESSMENT — FIBROSIS 4 INDEX: FIB4 SCORE: 1.527525231651946668

## 2024-04-10 NOTE — ASSESSMENT & PLAN NOTE
Patient had macrocytosis showing folate deficiency  He took 3 weeks of folate supplementation but reported erectile dysfunction so he stopped.   He is requesting boost as a supplement  He doesn't eat vegetables or fruits, eggs

## 2024-04-10 NOTE — ASSESSMENT & PLAN NOTE
PFTs showed mild obstruction, possible reactive airway depending on bronchilation, which couldn't be done.  He noted he was using albuterol 4 times a day but stopped as he felt like it was causing depression and appetite suppression so he stopped using it  Overall he feels that he is not coughing as much and not as short of breath.

## 2024-04-10 NOTE — PROGRESS NOTES
Subjective:     CC:   Chief Complaint   Patient presents with    Medication Management         HPI:     Chronic bronchitis (HCC)  PFTs showed mild obstruction, possible reactive airway depending on bronchilation, which couldn't be done.  He noted he was using albuterol 4 times a day but stopped as he felt like it was causing depression and appetite suppression so he stopped using it  Overall he feels that he is not coughing as much and not as short of breath.       Folate deficiency  Patient had macrocytosis showing folate deficiency  He took 3 weeks of folate supplementation but reported erectile dysfunction so he stopped.   He is requesting boost as a supplement  He doesn't eat vegetables or fruits, eggs      Past Medical History:   Diagnosis Date    MADDISON (acute kidney injury) ([facility]) 4/2/2016    Allergy     Anxiety     Cancer ([facility])     Cataract 2012    bilateral removal    Chronic midline low back pain with bilateral sciatica     COVID-19 virus infection 1/15/2022    Dehydration 4/2/2016    Depression     Diarrhea 4/2/2016    GERD (gastroesophageal reflux disease)     Hyperlipidemia     Hypertension     IBD (inflammatory bowel disease)     diarrhea    Kidney disease     Substance abuse ([facility])     Crack - clean since 2004       Social History     Tobacco Use    Smoking status: Every Day     Current packs/day: 0.50     Average packs/day: 0.5 packs/day for 40.0 years (20.0 ttl pk-yrs)     Types: Cigarettes    Smokeless tobacco: Never    Tobacco comments:     Max was 4 PPD for 5 years   Vaping Use    Vaping Use: Some days    Start date: 2/9/2021    Substances: THC    Devices: Disposable   Substance Use Topics    Alcohol use: Yes     Alcohol/week: 8.4 oz     Types: 14 Glasses of wine per week     Comment: 40 years of alcohol abuse, currently just 2 glasses wine most nights    Drug use: Yes     Types: Marijuana, Inhaled     Comment: thc vape       Current Outpatient Medications Ordered in Epic   Medication Sig Dispense  "Refill    budesonide-formoterol (SYMBICORT) 80-4.5 MCG/ACT Aerosol Inhale 2 Puffs 2 times a day. 10.2 g 2    Multiple Vitamins-Minerals (MULTIVITAMIN ADULTS) Tab Take 1 Tablet by mouth every day. 90 Tablet 3    NON SPECIFIED Shingles #1 1 Each 0    albuterol 108 (90 Base) MCG/ACT Aero Soln inhalation aerosol Inhale 2 Puffs every 6 hours as needed for Shortness of Breath. 8.5 g 5    amLODIPine (NORVASC) 5 MG Tab Take 1 Tablet by mouth every day. Indications: High Blood Pressure Disorder 90 Tablet 1    buPROPion (WELLBUTRIN XL) 300 MG XL tablet Take 1 Tablet by mouth every day. 90 Tablet 1    divalproex (DEPAKOTE) 250 MG Tablet Delayed Response Take 2 Tablets by mouth 2 times a day. 360 Tablet 1    gabapentin (NEURONTIN) 300 MG Cap Take 1 Capsule by mouth 3 times a day. 180 Capsule 1    lisinopril (PRINIVIL) 30 MG tablet Take 1 Tablet by mouth every day. 90 Tablet 1    pantoprazole (PROTONIX) 40 MG Tablet Delayed Response Take 1 Tablet by mouth every day. 90 Tablet 1    rosuvastatin (CRESTOR) 10 MG Tab Take 1 Tablet by mouth every evening. Indications: Disease of the Heart or Blood Vessels 90 Tablet 1    clindamycin 1 % Gel Use finger tip amount on affected area twice a day until resolution. Contact physician if no benefit after 2 weeks 75 mL 2    calcium carbonate (TUMS) 500 MG Chew Tab Chew 500-1,500 mg every day.       No current [facility]-ordered facility-administered medications on file.       Allergies:  Sulfamethoxazole, Abilify, Risperidone, and Omeprazole        Objective:       Exam:  BP 94/64 (BP Location: Left arm, Patient Position: Sitting)   Pulse 76   Temp 36.7 °C (98 °F) (Temporal)   Resp 14   Ht 1.626 m (5' 4\")   Wt 69.9 kg (154 lb)   SpO2 98%   BMI 26.43 kg/m²  Body mass index is 26.43 kg/m².    Constitutional: Alert, no distress, well-groomed.  Respiratory: Unlabored respiratory effort, no cough.  MSK: moves all extremities.  Psych: normal affect and mood.      Data reviewed:   Reviewed labs - " macrocytosis without anemia. Iron ok.   A1c 5.0  Bmp ok  Lipids great  Folate very low  B12 is normal      Assessment & Plan:     58 y.o. male with the following -     1. Simple chronic bronchitis (HCC)  - budesonide-formoterol (SYMBICORT) 80-4.5 MCG/ACT Aerosol; Inhale 2 Puffs 2 times a day.  Dispense: 10.2 g; Refill: 2  Pt had benefit from albuterol, PFTs show obstruction vs   Start symbicort. Counseled pt that albuterol is only if needed. Counseled on appropriate use 2 puffs bid with mouth rinsing.     2. Folate deficiency  - Multiple Vitamins-Minerals (MULTIVITAMIN ADULTS) Tab; Take 1 Tablet by mouth every day.  Dispense: 90 Tablet; Refill: 3  Pt thinks that the folate supplements caused his ED - he is agreeable to a multivitamin instead.   He was requesting nutritional supplements but he is unable to pick them up due to lack of transportation.     3. Need for vaccination  - NON SPECIFIED; Shingles #1  Dispense: 1 Each; Refill: 0      Return in about 1 month (around 5/10/2024) for f/u inhaler.    Please note that this dictation was created using voice recognition software. I have made every reasonable attempt to correct obvious errors, but I expect that there are errors of grammar and possibly content that I did not discover before finalizing the note.

## 2024-05-17 DIAGNOSIS — E78.2 MIXED HYPERLIPIDEMIA: ICD-10-CM

## 2024-05-17 NOTE — TELEPHONE ENCOUNTER
Received request via: Pharmacy    Was the patient seen in the last year in this department? Yes    Does the patient have an active prescription (recently filled or refills available) for medication(s) requested? No    [facility]      Does the patient have half-way Plus and need 100 day supply (blood pressure, diabetes and cholesterol meds only)? Patient does not have SCP

## 2024-05-22 RX ORDER — ROSUVASTATIN CALCIUM 10 MG/1
10 TABLET, COATED ORAL EVERY EVENING
Qty: 90 TABLET | Refills: 1 | Status: SHIPPED | OUTPATIENT
Start: 2024-05-22

## 2024-05-29 DIAGNOSIS — I10 ESSENTIAL HYPERTENSION: ICD-10-CM

## 2024-05-29 RX ORDER — AMLODIPINE BESYLATE 5 MG/1
5 TABLET ORAL DAILY
Qty: 90 TABLET | Refills: 1 | Status: SHIPPED | OUTPATIENT
Start: 2024-05-29

## 2024-05-29 NOTE — TELEPHONE ENCOUNTER
Received request via: Pharmacy    Was the patient seen in the last year in this department? Yes    Does the patient have an active prescription (recently filled or refills available) for medication(s) requested? No    [facility]    Does the patient have skilled nursing Plus and need 100 day supply (blood pressure, diabetes and cholesterol meds only)? Patient does not have SCP

## 2024-06-27 DIAGNOSIS — J41.0 SIMPLE CHRONIC BRONCHITIS (HCC): ICD-10-CM

## 2024-07-02 RX ORDER — BUDESONIDE AND FORMOTEROL FUMARATE DIHYDRATE 80; 4.5 UG/1; UG/1
2 AEROSOL RESPIRATORY (INHALATION) 2 TIMES DAILY
Qty: 10.2 G | Refills: 2 | Status: SHIPPED | OUTPATIENT
Start: 2024-07-02

## 2024-07-03 ENCOUNTER — TELEPHONE (OUTPATIENT)
Dept: MEDICAL GROUP | Facility: MEDICAL CENTER | Age: 59
End: 2024-07-03
Payer: MEDICAID

## 2024-07-12 ENCOUNTER — HOSPITAL ENCOUNTER (OUTPATIENT)
Facility: MEDICAL CENTER | Age: 59
End: 2024-07-12
Payer: MEDICAID

## 2024-07-12 ENCOUNTER — OFFICE VISIT (OUTPATIENT)
Dept: MEDICAL GROUP | Facility: MEDICAL CENTER | Age: 59
End: 2024-07-12
Payer: MEDICAID

## 2024-07-12 VITALS
SYSTOLIC BLOOD PRESSURE: 108 MMHG | DIASTOLIC BLOOD PRESSURE: 60 MMHG | WEIGHT: 151.6 LBS | TEMPERATURE: 97.2 F | RESPIRATION RATE: 16 BRPM | HEART RATE: 88 BPM | BODY MASS INDEX: 25.88 KG/M2 | HEIGHT: 64 IN | OXYGEN SATURATION: 97 %

## 2024-07-12 DIAGNOSIS — R10.819 SUPRAPUBIC TENDERNESS: ICD-10-CM

## 2024-07-12 DIAGNOSIS — R35.0 URINARY FREQUENCY: ICD-10-CM

## 2024-07-12 DIAGNOSIS — N41.0 ACUTE BACTERIAL PROSTATITIS: ICD-10-CM

## 2024-07-12 LAB
AMBIGUOUS DTTM AMBI4: NORMAL
APPEARANCE UR: CLEAR
APPEARANCE UR: CLEAR
BACTERIA #/AREA URNS HPF: NEGATIVE /HPF
BILIRUB UR QL STRIP.AUTO: NEGATIVE
BILIRUB UR STRIP-MCNC: NEGATIVE MG/DL
COLOR UR AUTO: YELLOW
COLOR UR: YELLOW
EPI CELLS #/AREA URNS HPF: NEGATIVE /HPF
GLUCOSE UR STRIP.AUTO-MCNC: NEGATIVE MG/DL
GLUCOSE UR STRIP.AUTO-MCNC: NEGATIVE MG/DL
HYALINE CASTS #/AREA URNS LPF: ABNORMAL /LPF
KETONES UR STRIP.AUTO-MCNC: ABNORMAL MG/DL
KETONES UR STRIP.AUTO-MCNC: NORMAL MG/DL
LEUKOCYTE ESTERASE UR QL STRIP.AUTO: ABNORMAL
LEUKOCYTE ESTERASE UR QL STRIP.AUTO: NORMAL
MICRO URNS: ABNORMAL
NITRITE UR QL STRIP.AUTO: NEGATIVE
NITRITE UR QL STRIP.AUTO: NEGATIVE
PH UR STRIP.AUTO: 6.5 [PH] (ref 5–8)
PH UR STRIP.AUTO: 7 [PH] (ref 5–8)
PROT UR QL STRIP: NEGATIVE MG/DL
PROT UR QL STRIP: NORMAL MG/DL
RBC # URNS HPF: ABNORMAL /HPF
RBC UR QL AUTO: NEGATIVE
RBC UR QL AUTO: NEGATIVE
SP GR UR STRIP.AUTO: 1.01
SP GR UR STRIP.AUTO: 1.02
UROBILINOGEN UR STRIP-MCNC: 0.2 MG/DL
UROBILINOGEN UR STRIP.AUTO-MCNC: 0.2 MG/DL
WBC #/AREA URNS HPF: ABNORMAL /HPF

## 2024-07-12 PROCEDURE — 3078F DIAST BP <80 MM HG: CPT

## 2024-07-12 PROCEDURE — 81001 URINALYSIS AUTO W/SCOPE: CPT

## 2024-07-12 PROCEDURE — 99213 OFFICE O/P EST LOW 20 MIN: CPT

## 2024-07-12 PROCEDURE — 99214 OFFICE O/P EST MOD 30 MIN: CPT

## 2024-07-12 PROCEDURE — 81002 URINALYSIS NONAUTO W/O SCOPE: CPT

## 2024-07-12 PROCEDURE — 3074F SYST BP LT 130 MM HG: CPT

## 2024-07-12 RX ORDER — MULTIVITAMIN
1 TABLET ORAL
COMMUNITY
Start: 2024-05-14

## 2024-07-12 RX ORDER — CIPROFLOXACIN 500 MG/1
500 TABLET, FILM COATED ORAL 2 TIMES DAILY
Qty: 90 TABLET | Refills: 0 | Status: SHIPPED | OUTPATIENT
Start: 2024-07-12 | End: 2024-08-26

## 2024-07-12 ASSESSMENT — FIBROSIS 4 INDEX: FIB4 SCORE: 1.527525231651946668

## 2024-07-25 ENCOUNTER — TELEPHONE (OUTPATIENT)
Dept: MEDICAL GROUP | Facility: MEDICAL CENTER | Age: 59
End: 2024-07-25
Payer: MEDICAID

## 2024-07-31 ENCOUNTER — TELEPHONE (OUTPATIENT)
Dept: MEDICAL GROUP | Facility: MEDICAL CENTER | Age: 59
End: 2024-07-31
Payer: MEDICAID

## 2024-07-31 NOTE — TELEPHONE ENCOUNTER
FINAL PRIOR AUTHORIZATION STATUS:    1.  Name of Medication & Dose: CIPROFLOXACIN     2. Prior Auth Status: Denied.  Reason: CRITERIA NOT MET    3. Action Taken: Pharmacy Notified: N\A Patient Notified: yes

## 2024-08-01 DIAGNOSIS — N41.0 ACUTE BACTERIAL PROSTATITIS: ICD-10-CM

## 2024-08-02 DIAGNOSIS — N41.0 ACUTE BACTERIAL PROSTATITIS: ICD-10-CM

## 2024-08-02 NOTE — PROGRESS NOTES
8/1/2024 4:40pm- Medicaid denied Quinolone antibiotic which is the first line treatment for acute bacterial prostatitis. Second line treatment is Bactrim however patient has an allergy. Discussed with RICHAR Laure[facility] ID Specialist who recommended Augmentin 875-125 TID. Herman reports he is feeling feverish, I recommended that he follow up with the ER since he's at risk of becoming septic, he declined.  Discussed with Herman that if his symptoms dont improve after starting the antibiotic that he made need to get  CT done to look for abscess or other cause of his symptoms.

## 2024-08-13 DIAGNOSIS — K21.9 GASTROESOPHAGEAL REFLUX DISEASE WITHOUT ESOPHAGITIS: ICD-10-CM

## 2024-08-13 NOTE — TELEPHONE ENCOUNTER
Received request via: Pharmacy    Was the patient seen in the last year in this department? Yes    Does the patient have an active prescription (recently filled or refills available) for medication(s) requested? No    [facility]    Does the patient have halfway Plus and need 100-day supply? (This applies to ALL medications) Patient does not have SCP        
Yes

## 2024-08-14 RX ORDER — PANTOPRAZOLE SODIUM 40 MG/1
40 TABLET, DELAYED RELEASE ORAL
Qty: 90 TABLET | Refills: 1 | Status: SHIPPED | OUTPATIENT
Start: 2024-08-14

## 2024-08-23 ENCOUNTER — APPOINTMENT (OUTPATIENT)
Dept: MEDICAL GROUP | Facility: MEDICAL CENTER | Age: 59
End: 2024-08-23
Attending: FAMILY MEDICINE
Payer: MEDICAID

## 2024-08-26 DIAGNOSIS — I10 ESSENTIAL HYPERTENSION: ICD-10-CM

## 2024-08-26 NOTE — TELEPHONE ENCOUNTER
Requested Prescriptions     Pending Prescriptions Disp Refills    amLODIPine (NORVASC) 5 MG Tab [Pharmacy Med Name: AMLODIPINE BESYLATE 5MG TABLETS] 90 Tablet 1     Sig: TAKE 1 TABLET BY MOUTH EVERY DAY FOR HIGH BLOOD PRESSURE      Last office visit: 4/10/24  Last lab: 3/5/24

## 2024-08-29 RX ORDER — AMLODIPINE BESYLATE 5 MG/1
5 TABLET ORAL
Qty: 90 TABLET | Refills: 1 | Status: SHIPPED | OUTPATIENT
Start: 2024-08-29

## 2024-09-11 ENCOUNTER — HOSPITAL ENCOUNTER (OUTPATIENT)
Facility: MEDICAL CENTER | Age: 59
End: 2024-09-11
Attending: FAMILY MEDICINE
Payer: MEDICAID

## 2024-09-11 ENCOUNTER — OFFICE VISIT (OUTPATIENT)
Dept: MEDICAL GROUP | Facility: MEDICAL CENTER | Age: 59
End: 2024-09-11
Attending: FAMILY MEDICINE
Payer: MEDICAID

## 2024-09-11 VITALS
HEIGHT: 64 IN | SYSTOLIC BLOOD PRESSURE: 94 MMHG | BODY MASS INDEX: 25.27 KG/M2 | HEART RATE: 99 BPM | DIASTOLIC BLOOD PRESSURE: 64 MMHG | TEMPERATURE: 97.9 F | WEIGHT: 148 LBS | OXYGEN SATURATION: 97 % | RESPIRATION RATE: 16 BRPM

## 2024-09-11 DIAGNOSIS — R30.0 DYSURIA: ICD-10-CM

## 2024-09-11 DIAGNOSIS — N41.0 ACUTE BACTERIAL PROSTATITIS: ICD-10-CM

## 2024-09-11 PROCEDURE — 81001 URINALYSIS AUTO W/SCOPE: CPT

## 2024-09-11 PROCEDURE — 3074F SYST BP LT 130 MM HG: CPT | Performed by: FAMILY MEDICINE

## 2024-09-11 PROCEDURE — 99214 OFFICE O/P EST MOD 30 MIN: CPT | Performed by: FAMILY MEDICINE

## 2024-09-11 PROCEDURE — 3078F DIAST BP <80 MM HG: CPT | Performed by: FAMILY MEDICINE

## 2024-09-11 PROCEDURE — 99213 OFFICE O/P EST LOW 20 MIN: CPT | Performed by: FAMILY MEDICINE

## 2024-09-11 PROCEDURE — 87086 URINE CULTURE/COLONY COUNT: CPT

## 2024-09-11 RX ORDER — CIPROFLOXACIN 500 MG/1
500 TABLET, FILM COATED ORAL 2 TIMES DAILY
Qty: 84 TABLET | Refills: 0 | Status: SHIPPED | OUTPATIENT
Start: 2024-09-11 | End: 2024-09-11 | Stop reason: SDUPTHER

## 2024-09-11 RX ORDER — CIPROFLOXACIN 500 MG/1
500 TABLET, FILM COATED ORAL 2 TIMES DAILY
Qty: 84 TABLET | Refills: 0 | Status: SHIPPED | OUTPATIENT
Start: 2024-09-11 | End: 2024-10-23

## 2024-09-11 ASSESSMENT — FIBROSIS 4 INDEX: FIB4 SCORE: 1.527525231651946668

## 2024-09-12 LAB
APPEARANCE UR: CLEAR
BACTERIA #/AREA URNS HPF: NEGATIVE /HPF
BILIRUB UR QL STRIP.AUTO: NEGATIVE
COLOR UR: YELLOW
EPI CELLS #/AREA URNS HPF: NEGATIVE /HPF
GLUCOSE UR STRIP.AUTO-MCNC: NEGATIVE MG/DL
HYALINE CASTS #/AREA URNS LPF: ABNORMAL /LPF
KETONES UR STRIP.AUTO-MCNC: ABNORMAL MG/DL
LEUKOCYTE ESTERASE UR QL STRIP.AUTO: ABNORMAL
MICRO URNS: ABNORMAL
NITRITE UR QL STRIP.AUTO: NEGATIVE
PH UR STRIP.AUTO: 7.5 [PH] (ref 5–8)
PROT UR QL STRIP: NEGATIVE MG/DL
RBC # URNS HPF: ABNORMAL /HPF
RBC UR QL AUTO: NEGATIVE
SP GR UR STRIP.AUTO: 1.01
UROBILINOGEN UR STRIP.AUTO-MCNC: 1 MG/DL
WBC #/AREA URNS HPF: ABNORMAL /HPF

## 2024-09-12 NOTE — PROGRESS NOTES
"Verbal consent was acquired by the patient to use [employer] ambient listening note generation during this visit   Subjective:     HPI:   History of Present Illness  The patient presents for evaluation of multiple medical concerns.    He reports that the new inhaler prescribed in 04/2024 has been effective, although he occasionally experiences a buildup of symptoms at night. He uses two puffs of the symbicort before bedtime, which seems to alleviate his symptoms. He does not cough excessively but does produce some phlegm when he does. His mobility is good, with no significant coughing during physical activity. He can tolerate exercise and does not experience discomfort even in smoky conditions. He does not use albuterol or any rescue inhalers. He reports no acid reflux or a chronic runny nose. He also does not experience shortness of breath during exercise.    He takes pantoprazole every morning and expresses concern about potential stomach burning if he switches to taking it at night.    Pt continues to have issues with what was determined to be prostatitis. Insurance would not cover cipro due to lack of urine culture and he is allergic to bactrim. He started augmentin based on recommendation from ID but experienced severe diarrhea and had to stop it after 8 days. He did report improvement in his prostate/urinary issues. Since then he has continued to experience subjective fever, feeling hot, night sweats, pain during urination, urinary frequency with small amounts of urine, low energy levels.     ALLERGIES  He is allergic to BACTRIM.      Objective:     Exam:  BP 94/64 (BP Location: Left arm, Patient Position: Sitting)   Pulse 99   Temp 36.6 °C (97.9 °F) (Temporal)   Resp 16   Ht 1.626 m (5' 4\")   Wt 67.1 kg (148 lb)   SpO2 97%   BMI 25.40 kg/m²  Body mass index is 25.4 kg/m².      Constitutional: Alert, no distress, well-groomed.  Respiratory: Unlabored respiratory effort, no cough.  MSK: moves all " extremities.  Psych: normal affect and mood.          Data:     None new    Assessment & Plan:     1. Dysuria  CANCELED: URINE CULTURE(NEW)    CANCELED: URINALYSIS,CULTURE IF INDICATED      2. Acute bacterial prostatitis  ciprofloxacin (CIPRO) 500 MG Tab    DISCONTINUED: ciprofloxacin (CIPRO) 500 MG Tab          Assessment & Plan  1. Mild COPD.  His mild COPD is well-managed with the current inhaler regimen, allowing him to maintain an active lifestyle without significant coughing or phlegm production. The current inhaler dosage will be maintained. He does not require the use of a rescue inhaler. He has nighttime cough, which could be from GERD, plan as below.    2. Acid Reflux.  He was advised to switch his pantoprazole intake from morning to evening to assess its effectiveness in managing his symptoms. If taking pantoprazole at night causes stomach burning, he can revert to the morning schedule.    3. Prostatitis.  Despite a negative urine culture, his symptoms strongly suggest prostatitis. His intolerance to Augmentin and insurance's refusal to cover ciprofloxacin due to insufficient infection data complicate the treatment plan. A urine sample will be collected for further analysis. A prescription for ciprofloxacin has been sent to [facility], which he can obtain using a [employer] coupon. He will provide feedback on the effectiveness of this treatment.    Follow-up  A follow-up appointment is scheduled for 1 month from now.          Return in about 1 month (around 10/11/2024).      Please note that this dictation was created using voice recognition software. I have made every reasonable attempt to correct obvious errors, but I expect that there are errors of grammar and possibly content that I did not discover before finalizing the note.

## 2024-09-13 DIAGNOSIS — G89.29 CHRONIC MIDLINE LOW BACK PAIN WITH BILATERAL SCIATICA: ICD-10-CM

## 2024-09-13 DIAGNOSIS — F31.62 BIPOLAR DISORDER, CURRENT EPISODE MIXED, MODERATE (HCC): ICD-10-CM

## 2024-09-13 DIAGNOSIS — M54.41 CHRONIC MIDLINE LOW BACK PAIN WITH BILATERAL SCIATICA: ICD-10-CM

## 2024-09-13 DIAGNOSIS — E78.2 MIXED HYPERLIPIDEMIA: ICD-10-CM

## 2024-09-13 DIAGNOSIS — I10 ESSENTIAL HYPERTENSION: ICD-10-CM

## 2024-09-13 DIAGNOSIS — M54.42 CHRONIC MIDLINE LOW BACK PAIN WITH BILATERAL SCIATICA: ICD-10-CM

## 2024-09-13 DIAGNOSIS — K21.9 GASTROESOPHAGEAL REFLUX DISEASE WITHOUT ESOPHAGITIS: ICD-10-CM

## 2024-09-13 DIAGNOSIS — J41.0 SIMPLE CHRONIC BRONCHITIS (HCC): ICD-10-CM

## 2024-09-13 RX ORDER — DIVALPROEX SODIUM 250 MG/1
500 TABLET, DELAYED RELEASE ORAL 2 TIMES DAILY
Qty: 360 TABLET | Refills: 1 | Status: SHIPPED | OUTPATIENT
Start: 2024-09-13

## 2024-09-13 RX ORDER — LISINOPRIL 30 MG/1
30 TABLET ORAL
Qty: 90 TABLET | Refills: 1 | Status: SHIPPED | OUTPATIENT
Start: 2024-09-13

## 2024-09-13 RX ORDER — GABAPENTIN 300 MG/1
300 CAPSULE ORAL 3 TIMES DAILY
Qty: 180 CAPSULE | Refills: 1 | Status: SHIPPED | OUTPATIENT
Start: 2024-09-13

## 2024-09-13 RX ORDER — BUPROPION HYDROCHLORIDE 300 MG/1
300 TABLET ORAL
Qty: 90 TABLET | Refills: 1 | Status: SHIPPED | OUTPATIENT
Start: 2024-09-13

## 2024-09-13 RX ORDER — PANTOPRAZOLE SODIUM 40 MG/1
40 TABLET, DELAYED RELEASE ORAL
Qty: 90 TABLET | Refills: 1 | Status: SHIPPED | OUTPATIENT
Start: 2024-09-13

## 2024-09-13 RX ORDER — AMLODIPINE BESYLATE 5 MG/1
5 TABLET ORAL
Qty: 90 TABLET | Refills: 1 | Status: SHIPPED | OUTPATIENT
Start: 2024-09-13

## 2024-09-13 RX ORDER — BUDESONIDE AND FORMOTEROL FUMARATE DIHYDRATE 80; 4.5 UG/1; UG/1
2 AEROSOL RESPIRATORY (INHALATION) 2 TIMES DAILY
Qty: 10.2 G | Refills: 2 | Status: SHIPPED | OUTPATIENT
Start: 2024-09-13

## 2024-09-13 RX ORDER — ROSUVASTATIN CALCIUM 10 MG/1
10 TABLET, COATED ORAL EVERY EVENING
Qty: 90 TABLET | Refills: 1 | Status: SHIPPED | OUTPATIENT
Start: 2024-09-13

## 2024-09-14 LAB
BACTERIA UR CULT: NORMAL
SIGNIFICANT IND 70042: NORMAL
SITE SITE: NORMAL
SOURCE SOURCE: NORMAL

## 2024-10-01 DIAGNOSIS — J41.0 SIMPLE CHRONIC BRONCHITIS (HCC): ICD-10-CM

## 2024-10-02 RX ORDER — BUDESONIDE AND FORMOTEROL FUMARATE DIHYDRATE 80; 4.5 UG/1; UG/1
2 AEROSOL RESPIRATORY (INHALATION) 2 TIMES DAILY
Qty: 10.2 G | Refills: 5 | Status: SHIPPED | OUTPATIENT
Start: 2024-10-02

## 2024-10-16 ENCOUNTER — HOSPITAL ENCOUNTER (OUTPATIENT)
Facility: MEDICAL CENTER | Age: 59
End: 2024-10-16
Attending: FAMILY MEDICINE
Payer: MEDICAID

## 2024-10-16 ENCOUNTER — OFFICE VISIT (OUTPATIENT)
Dept: MEDICAL GROUP | Facility: MEDICAL CENTER | Age: 59
End: 2024-10-16
Attending: FAMILY MEDICINE
Payer: MEDICAID

## 2024-10-16 VITALS
TEMPERATURE: 97.2 F | OXYGEN SATURATION: 98 % | HEART RATE: 96 BPM | BODY MASS INDEX: 25.1 KG/M2 | DIASTOLIC BLOOD PRESSURE: 70 MMHG | RESPIRATION RATE: 14 BRPM | HEIGHT: 64 IN | WEIGHT: 147 LBS | SYSTOLIC BLOOD PRESSURE: 116 MMHG

## 2024-10-16 DIAGNOSIS — N41.0 ACUTE BACTERIAL PROSTATITIS: ICD-10-CM

## 2024-10-16 DIAGNOSIS — Z23 NEED FOR VACCINATION: ICD-10-CM

## 2024-10-16 DIAGNOSIS — N40.0 ENLARGED PROSTATE: ICD-10-CM

## 2024-10-16 PROCEDURE — 90471 IMMUNIZATION ADMIN: CPT

## 2024-10-16 PROCEDURE — 3078F DIAST BP <80 MM HG: CPT | Performed by: FAMILY MEDICINE

## 2024-10-16 PROCEDURE — 99214 OFFICE O/P EST MOD 30 MIN: CPT | Mod: 25 | Performed by: FAMILY MEDICINE

## 2024-10-16 PROCEDURE — 81001 URINALYSIS AUTO W/SCOPE: CPT

## 2024-10-16 PROCEDURE — 3074F SYST BP LT 130 MM HG: CPT | Performed by: FAMILY MEDICINE

## 2024-10-16 PROCEDURE — 99214 OFFICE O/P EST MOD 30 MIN: CPT | Performed by: FAMILY MEDICINE

## 2024-10-16 PROCEDURE — 87086 URINE CULTURE/COLONY COUNT: CPT

## 2024-10-16 PROCEDURE — 87491 CHLMYD TRACH DNA AMP PROBE: CPT

## 2024-10-16 PROCEDURE — 87591 N.GONORRHOEAE DNA AMP PROB: CPT

## 2024-10-16 ASSESSMENT — FIBROSIS 4 INDEX: FIB4 SCORE: 1.527525231651946668

## 2024-10-17 LAB
APPEARANCE UR: CLEAR
BACTERIA #/AREA URNS HPF: NEGATIVE /HPF
BILIRUB UR QL STRIP.AUTO: NEGATIVE
C TRACH DNA SPEC QL NAA+PROBE: NEGATIVE
COLOR UR: YELLOW
EPI CELLS #/AREA URNS HPF: NEGATIVE /HPF
GLUCOSE UR STRIP.AUTO-MCNC: NEGATIVE MG/DL
HYALINE CASTS #/AREA URNS LPF: ABNORMAL /LPF
KETONES UR STRIP.AUTO-MCNC: 15 MG/DL
LEUKOCYTE ESTERASE UR QL STRIP.AUTO: ABNORMAL
MICRO URNS: ABNORMAL
N GONORRHOEA DNA SPEC QL NAA+PROBE: NEGATIVE
NITRITE UR QL STRIP.AUTO: NEGATIVE
PH UR STRIP.AUTO: 6 [PH] (ref 5–8)
PROT UR QL STRIP: NEGATIVE MG/DL
RBC # URNS HPF: ABNORMAL /HPF
RBC UR QL AUTO: NEGATIVE
SP GR UR STRIP.AUTO: 1.02
SPECIMEN SOURCE: NORMAL
UROBILINOGEN UR STRIP.AUTO-MCNC: 0.2 MG/DL
WBC #/AREA URNS HPF: ABNORMAL /HPF

## 2024-10-19 LAB
BACTERIA UR CULT: NORMAL
SIGNIFICANT IND 70042: NORMAL
SITE SITE: NORMAL
SOURCE SOURCE: NORMAL

## 2024-10-28 DIAGNOSIS — I10 ESSENTIAL HYPERTENSION: ICD-10-CM

## 2024-10-30 RX ORDER — LISINOPRIL 30 MG/1
30 TABLET ORAL
Qty: 90 TABLET | Refills: 1 | Status: SHIPPED | OUTPATIENT
Start: 2024-10-30

## 2024-11-01 ENCOUNTER — APPOINTMENT (OUTPATIENT)
Dept: RADIOLOGY | Facility: MEDICAL CENTER | Age: 59
End: 2024-11-01
Attending: EMERGENCY MEDICINE
Payer: MEDICAID

## 2024-11-01 ENCOUNTER — HOSPITAL ENCOUNTER (EMERGENCY)
Facility: MEDICAL CENTER | Age: 59
End: 2024-11-01
Attending: EMERGENCY MEDICINE
Payer: MEDICAID

## 2024-11-01 ENCOUNTER — APPOINTMENT (OUTPATIENT)
Dept: UROLOGY | Facility: MEDICAL CENTER | Age: 59
End: 2024-11-01
Payer: MEDICAID

## 2024-11-01 VITALS
OXYGEN SATURATION: 96 % | RESPIRATION RATE: 20 BRPM | BODY MASS INDEX: 24.75 KG/M2 | DIASTOLIC BLOOD PRESSURE: 77 MMHG | HEIGHT: 64 IN | TEMPERATURE: 98 F | WEIGHT: 145 LBS | HEART RATE: 100 BPM | SYSTOLIC BLOOD PRESSURE: 124 MMHG
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
## 2024-11-01 DIAGNOSIS — S42.031A CLOSED DISPLACED FRACTURE OF ACROMIAL END OF RIGHT CLAVICLE, INITIAL ENCOUNTER: ICD-10-CM

## 2024-11-01 DIAGNOSIS — N13.8 BPH WITH OBSTRUCTION/LOWER URINARY TRACT SYMPTOMS: ICD-10-CM

## 2024-11-01 DIAGNOSIS — S70.01XA CONTUSION OF RIGHT HIP, INITIAL ENCOUNTER: ICD-10-CM

## 2024-11-01 DIAGNOSIS — N40.1 BPH WITH OBSTRUCTION/LOWER URINARY TRACT SYMPTOMS: ICD-10-CM

## 2024-11-01 DIAGNOSIS — S86.911A STRAIN OF RIGHT KNEE, INITIAL ENCOUNTER: ICD-10-CM

## 2024-11-01 PROCEDURE — 73501 X-RAY EXAM HIP UNI 1 VIEW: CPT | Mod: RT

## 2024-11-01 PROCEDURE — 73562 X-RAY EXAM OF KNEE 3: CPT | Mod: RT

## 2024-11-01 PROCEDURE — 99284 EMERGENCY DEPT VISIT MOD MDM: CPT

## 2024-11-01 PROCEDURE — 99243 OFF/OP CNSLTJ NEW/EST LOW 30: CPT | Performed by: UROLOGY

## 2024-11-01 PROCEDURE — 73030 X-RAY EXAM OF SHOULDER: CPT | Mod: RT

## 2024-11-01 PROCEDURE — 307740 HCHG GREEN TRAUMA TEAM SERVICES

## 2024-11-01 RX ORDER — SILODOSIN 8 MG/1
8 CAPSULE ORAL DAILY
Qty: 30 CAPSULE | Refills: 11 | Status: SHIPPED | OUTPATIENT
Start: 2024-11-01

## 2024-11-01 ASSESSMENT — FIBROSIS 4 INDEX: FIB4 SCORE: 1.527525231651946668

## 2024-11-01 NOTE — ED PROVIDER NOTES
"  ER Provider Note    Scribed for Stanislav Nunn M.D. by José Miguel Mcgarry. 11/1/2024   10:59 AM    Primary Care Provider: Hillary Damon M.D.    CHIEF COMPLAINT  Chief Complaint   Patient presents with    T-5000 FALL     Pt fell Monday going down half set of stairs. Pt states he slipped and went to grab with his left hand and fell onto his R shoulder and R hip. Pt denies hitting his head     EXTERNAL RECORDS REVIEWED  None pertinent    HPI/ROS  LIMITATION TO HISTORY   None noted   OUTSIDE HISTORIAN(S):  None noted     Herman Chiang is a 58 y.o. male who presents to the ED for evaluation following a mechanical fall down a half flight of stairs. Patient notes a fall 5 days ago, during which he injured his right shoulder, right knee and hip. Patient is ambulatory after the event. Patient notes a head strike but denies loss of consciousness. Patient notes mental \"fogginess\" for 3 days after the event, bu says this symptoms has resolved since then. Patient denies any other illness or injury.    PAST MEDICAL HISTORY  Past Medical History:   Diagnosis Date    MADDISON (acute kidney injury) (HCC) 4/2/2016    Allergy     Anxiety     Cancer (HCC)     Cataract 2012    bilateral removal    Chronic midline low back pain with bilateral sciatica     COVID-19 virus infection 1/15/2022    Dehydration 4/2/2016    Depression     Diarrhea 4/2/2016    GERD (gastroesophageal reflux disease)     Hyperlipidemia     Hypertension     IBD (inflammatory bowel disease)     diarrhea    Kidney disease     Substance abuse (HCC)     Crack - clean since 2004       SURGICAL HISTORY  Past Surgical History:   Procedure Laterality Date    EYE SURGERY Bilateral 2012    cataracts    OTHER      mohs surgery earlier this year with luxe    OTHER SURGICAL PROCEDURE Bilateral     BCCA skin multiple cites       FAMILY HISTORY  Family History   Problem Relation Age of Onset    Hypertension Mother     Cancer Father         Nodular lymphoma    Arthritis Brother     " Dementia Maternal Grandmother     Stroke Maternal Grandmother     Cancer Maternal Grandfather         lung    Heart Attack Paternal Grandmother     Heart Disease Paternal Grandmother     Hypertension Paternal Grandmother     Hyperlipidemia Paternal Grandmother     No Known Problems Paternal Grandfather     No Known Problems Brother     Diabetes Neg Hx     Kidney Disease Neg Hx        SOCIAL HISTORY   reports that he has been smoking cigarettes. He has a 20 pack-year smoking history. He has never used smokeless tobacco. He reports current alcohol use of about 8.4 oz of alcohol per week. He reports current drug use. Drugs: Marijuana and Inhaled.    CURRENT MEDICATIONS  Previous Medications    AMLODIPINE (NORVASC) 5 MG TAB    Take 1 Tablet by mouth every day.    BUDESONIDE-FORMOTEROL (SYMBICORT) 80-4.5 MCG/ACT AEROSOL    Inhale 2 Puffs 2 times a day.    BUPROPION (WELLBUTRIN XL) 300 MG XL TABLET    Take 1 Tablet by mouth every day.    DIVALPROEX (DEPAKOTE) 250 MG TABLET DELAYED RESPONSE    Take 2 Tablets by mouth 2 times a day.    GABAPENTIN (NEURONTIN) 300 MG CAP    Take 1 Capsule by mouth 3 times a day.    LISINOPRIL (PRINIVIL) 30 MG TABLET    Take 1 Tablet by mouth every day.    MULTIPLE VITAMIN (MULTIVITAMIN) TAB    Take 1 Tablet by mouth every day.    MULTIPLE VITAMINS-MINERALS (MULTIVITAMIN ADULTS) TAB    Take 1 Tablet by mouth every day.    NON SPECIFIED    Shingles #1    PANTOPRAZOLE (PROTONIX) 40 MG TABLET DELAYED RESPONSE    Take 1 Tablet by mouth every day.    ROSUVASTATIN (CRESTOR) 10 MG TAB    Take 1 Tablet by mouth every evening. Indications: Disease of the Heart or Blood Vessels    SILODOSIN (RAPAFLO) 8 MG CAP CAPSULE    Take 1 Capsule by mouth every day.       ALLERGIES  Allergies   Allergen Reactions    Sulfamethoxazole Swelling    Abilify      Flu like sx about 4 years ago    Risperidone      Flu like sx 4 years ago    Omeprazole Unspecified     Pt dislikes the way this drug makes him feel     "    PHYSICAL EXAM  /72   Pulse (!) 103   Temp 36.4 °C (97.6 °F) (Temporal)   Resp 16   Ht 1.626 m (5' 4\")   Wt 65.8 kg (145 lb)   SpO2 96%   BMI 24.89 kg/m²      Nursing note and vitals reviewed.  Constitutional: Well-developed and well-nourished. No distress.   HENT: Head is normocephalic and atraumatic.   Cardiovascular: Capillary refill is less than 2 seconds   Pulmonary/Chest: No respiratory distress.    Abdominal: Atraumatic.  Musculoskeletal: Extremities exhibit normal range of motion, Tenderness Right hip, right shoulder and right knee.  Neurological: Awake, alert and oriented to person, place, and time.   Skin: Skin is warm and dry. No rash.   Psychiatric: Normal mood and affect. Appropriate for clinical situation     DIAGNOSTIC STUDIES    Radiology:   This attending emergency physician has independently interpreted the diagnostic imaging associated with this visit and is awaiting the final reading from the radiologist.   Preliminary interpretation is a follows: Shoulder x-ray demonstrates a distal clavicle fracture.  No apparent fracture or dislocation of the knee.  No displaced rib fracture.    Radiologist interpretation:   DX-SHOULDER 2+ RIGHT   Final Result      Acute closed displaced intra-articular avulsion fracture of the distal clavicle with 7 mm displacement and regional soft tissue swelling.      DX-KNEE 3 VIEWS RIGHT   Final Result      1.  No radiographic evidence of acute traumatic injury.   2.  Osteoarthritis      DX-HIP-UNILATERAL-WITH PELVIS-1 VIEW RIGHT   Final Result      1.  No radiographic evidence of acute traumatic injury.   2.  Osteoarthritis           INITIAL ASSESSMENT AND PLAN    10:59 AM - Patient was evaluated at bedside. Ordered for X ray to evaluate. The patient will be medicated as ordered for his symptoms. Patient verbalizes understanding and support with my plan of care.  The patient will undergo evaluation for traumatic injury.    11:30 AM - 11:47 AM - I " reevaluated the patient at bedside.  I discussed the patient's diagnostic study results which are remarkable for a distal clavicle fracture.  Otherwise unremarkable.. I discussed plan for discharge and follow up as outlined below. The patient is stable for discharge at this time and will return for any new or worsening symptoms. Patient verbalizes understanding and support with my plan for discharge.        DISPOSITION AND DISCUSSIONS    I have discussed management of the patient with the following physicians and LIZA's:  None noted     Discussion of management with other HP or appropriate source(s): None     The patient will return for new or worsening symptoms and is stable at the time of discharge.    DISPOSITION:  Patient will be discharged home in stable condition.    FOLLOW UP:  Hillary Damon M.D.  [address]  [address]  [facility] [id number] [phone]    Schedule an appointment as soon as possible for a visit       [facility], Emergency Dept  [address] [facility] [id number] [phone]    If symptoms worsen    Bjorn Hoffman M.D.  [address]  [address]  [facility] [id number] [phone]    Schedule an appointment as soon as possible for a visit   orthopedic specialist      OUTPATIENT MEDICATIONS:  New Prescriptions    No medications on file        FINAL DIAGNOSIS  1. Fall, initial encounter    2. Closed displaced fracture of acromial end of right clavicle, initial encounter    3. Strain of right knee, initial encounter    4. Contusion of right hip, initial encounter         José Miguel BEST (Dana), am scribing for, and in the presence of, Stanislav Nunn M.D..    Electronically signed by: José Miguel Mcgarry (Dana), 11/1/2024    Stanislav BEST M.D. personally performed the services described in this documentation, as scribed by José Miguel Mcgarry in my presence, and it is both accurate and complete.      The note accurately reflects work and decisions made by me.   Stanislav Nunn M.D.  11/1/2024  2:38 PM

## 2024-11-01 NOTE — PROGRESS NOTES
Chief Complaint: prostatitis    The patient was referred by Hillary Damon M.D. for evaluation of the above chief complaint    History of Present Illness:    Herman Chiang is a 58 y.o. male who presents today for prostatitis  - Of note, recent fall this week  - Will present to ER later today to evaluate injuries from the fall    - Reports minimal baseline urinary symptoms  - Recently with new urinary symptoms starting summer 2024  - Started antibiotics with Dr. Damon, prescribed for 3 weeks  - Only took 8 days due to medication side effects  - Continues to have bladder/prostatic pain  - Describes a fullness / swelling in his lower pelvis  - ?Fevers associated with urinary symptoms    - Previous episode of similar symptoms 7-10 years ago  - Drastically improved with antibiotics    Subjective    Family History   Problem Relation Age of Onset    Hypertension Mother     Cancer Father         Nodular lymphoma    Arthritis Brother     Dementia Maternal Grandmother     Stroke Maternal Grandmother     Cancer Maternal Grandfather         lung    Heart Attack Paternal Grandmother     Heart Disease Paternal Grandmother     Hypertension Paternal Grandmother     Hyperlipidemia Paternal Grandmother     No Known Problems Paternal Grandfather     No Known Problems Brother     Diabetes Neg Hx     Kidney Disease Neg Hx      Social History     Socioeconomic History    Marital status: Legally      Spouse name: Not on file    Number of children: 1    Years of education: Not on file    Highest education level: 12th grade   Occupational History    Not on file   Tobacco Use    Smoking status: Some Days     Current packs/day: 0.50     Average packs/day: 0.5 packs/day for 40.0 years (20.0 ttl pk-yrs)     Types: Cigarettes    Smokeless tobacco: Never    Tobacco comments:     Max was 4 PPD for 5 years   Vaping Use    Vaping status: Some Days    Start date: 2/9/2021    Substances: THC    Devices: Disposable   Substance and Sexual  Activity    Alcohol use: Yes     Alcohol/week: 8.4 oz     Types: 14 Glasses of wine per week     Comment: 40 years of alcohol abuse, currently just 2 glasses wine most nights    Drug use: Yes     Types: Marijuana, Inhaled     Comment: thc vape    Sexual activity: Not Currently     Partners: Female   Other Topics Concern    Not on file   Social History Narrative    Not on file     Social Determinants of Health     Financial Resource Strain: Medium Risk (11/6/2023)    Overall Financial Resource Strain (CARDIA)     Difficulty of Paying Living Expenses: Somewhat hard   Food Insecurity: Food Insecurity Present (11/6/2023)    Hunger Vital Sign     Worried About Running Out of Food in the Last Year: Never true     Ran Out of Food in the Last Year: Sometimes true   Transportation Needs: Unmet Transportation Needs (11/6/2023)    PRAPARE - Transportation     Lack of Transportation (Medical): Yes     Lack of Transportation (Non-Medical): Yes   Physical Activity: Insufficiently Active (11/6/2023)    Exercise Vital Sign     Days of Exercise per Week: 2 days     Minutes of Exercise per Session: 20 min   Stress: Stress Concern Present (11/6/2023)    [employer] [place] [employer] - Occupational Stress Questionnaire     Feeling of Stress : Rather much   Social Connections: Socially Isolated (11/6/2023)    Social Connection and Isolation Panel [NHANES]     Frequency of Communication with Friends and Family: Once a week     Frequency of Social Gatherings with Friends and Family: Never     Attends [employer]ist Services: Never     Active Member of Clubs or Organizations: No     Attends Club or Organization Meetings: Never     Marital Status:    Intimate Partner Violence: Not on file   Housing Stability: High Risk (11/6/2023)    Housing Stability Vital Sign     Unable to Pay for Housing in the Last Year: No     Number of Places Lived in the Last Year: Not on file     Unstable Housing in the Last Year: Yes     Past  Surgical History:   Procedure Laterality Date    EYE SURGERY Bilateral 2012    cataracts    OTHER      mohs surgery earlier this year with luxe    OTHER SURGICAL PROCEDURE Bilateral     BCCA skin multiple cites     Past Medical History:   Diagnosis Date    MADDISON (acute kidney injury) ([facility]) 4/2/2016    Allergy     Anxiety     Cancer ([facility])     Cataract 2012    bilateral removal    Chronic midline low back pain with bilateral sciatica     COVID-19 virus infection 1/15/2022    Dehydration 4/2/2016    Depression     Diarrhea 4/2/2016    GERD (gastroesophageal reflux disease)     Hyperlipidemia     Hypertension     IBD (inflammatory bowel disease)     diarrhea    Kidney disease     Substance abuse ([facility])     Crack - clean since 2004     Current Outpatient Medications   Medication Sig Dispense Refill    lisinopril (PRINIVIL) 30 MG tablet Take 1 Tablet by mouth every day. 90 Tablet 1    budesonide-formoterol (SYMBICORT) 80-4.5 MCG/ACT Aerosol Inhale 2 Puffs 2 times a day. 10.2 g 5    amLODIPine (NORVASC) 5 MG Tab Take 1 Tablet by mouth every day. 90 Tablet 1    buPROPion (WELLBUTRIN XL) 300 MG XL tablet Take 1 Tablet by mouth every day. 90 Tablet 1    divalproex (DEPAKOTE) 250 MG Tablet Delayed Response Take 2 Tablets by mouth 2 times a day. 360 Tablet 1    gabapentin (NEURONTIN) 300 MG Cap Take 1 Capsule by mouth 3 times a day. 180 Capsule 1    pantoprazole (PROTONIX) 40 MG Tablet Delayed Response Take 1 Tablet by mouth every day. 90 Tablet 1    rosuvastatin (CRESTOR) 10 MG Tab Take 1 Tablet by mouth every evening. Indications: Disease of the Heart or Blood Vessels 90 Tablet 1    Multiple Vitamin (MULTIVITAMIN) Tab Take 1 Tablet by mouth every day.      Multiple Vitamins-Minerals (MULTIVITAMIN ADULTS) Tab Take 1 Tablet by mouth every day. 90 Tablet 3    NON SPECIFIED Shingles #1 1 Each 0     No current facility-administered medications for this visit.     Allergies   Allergen Reactions    Sulfamethoxazole Swelling    Abilify       Flu like sx about 4 years ago    Risperidone      Flu like sx 4 years ago    Omeprazole Unspecified     Pt dislikes the way this drug makes him feel       Review of systems was conducted and was negative except for as explicitly listed in the History of Present Illness.       Objective   There were no vitals taken for this visit.  Physical Exam  Vitals reviewed.   HENT:      Head: Normocephalic.      Nose: Nose normal.      Mouth/Throat:      Pharynx: Oropharynx is clear.   Eyes:      Conjunctiva/sclera: Conjunctivae normal.   Cardiovascular:      Rate and Rhythm: Normal rate.      Pulses: Normal pulses.   Pulmonary:      Effort: Pulmonary effort is normal.   Abdominal:      Palpations: Abdomen is soft.   Musculoskeletal:      Cervical back: Neck supple.      Comments: Limited mobility of his R shoulder and R knee  Walking with a cane   Skin:     General: Skin is warm.   Neurological:      Mental Status: He is alert. Mental status is at baseline.   Psychiatric:         Mood and Affect: Mood normal.         Lab/Radiology/Diagnostic Review:  POCT UA   Lab Results   Component Value Date/Time    POCCOLOR YELLOW 07/12/2024 10:54 AM    POCAPPEAR CLEAR 07/12/2024 10:54 AM    POCLEUKEST TRACE 07/12/2024 10:54 AM    POCNITRITE NEGATIVE 07/12/2024 10:54 AM    POCUROBILIGE 0.2 07/12/2024 10:54 AM    POCPROTEIN 30MG 07/12/2024 10:54 AM    POCURPH 7.0 07/12/2024 10:54 AM    POCBLOOD NEGATIVE 07/12/2024 10:54 AM    POCSPGRV 1.015 07/12/2024 10:54 AM    POCKETONES TRACE 07/12/2024 10:54 AM    POCBILIRUBIN NEGATIVE 07/12/2024 10:54 AM    POCGLUCUA NEGATIVE 07/12/2024 10:54 AM      Urine culture negative x 2    I have reviewed and independently examined the lab results.  My findings are given in the HPI or above with the associated tests.        Assessment & Plan    It was a pleasure speaking with Herman Chiang today.    Herman Chiang is a 58 y.o. male w/ prostatitis  - Discussed his symptoms are likely secondary to BPH,  which is predisposing him to prostatitis  - Unclear if he has an ongoing prostatic infection since the urine culture is negative  - Will trial alpha blockers to improve flow  - Given sulfa allergy will avoid tamsulosin  - Recommend silodosin instead  - Discussed risks, benefits, alternatives, and side effects to this medication  - Also may be beneficial for the patient to take a daily aspirin to reduce inflammation    - Recommend ER/urgent care presentation today for at minimum x-rays of his injuries  - He will head to our ER today    - Will call in 3 weeks after he has been on silodosin   - Hopefully symptoms will have improved

## 2024-11-01 NOTE — ED NOTES
Sling applied to R shoulder. Pt verbalized comfort.  Educated on proper use, all questions answered.

## 2024-11-01 NOTE — ED NOTES
Pt back from xray.    Patient updated on plan of care, all questions answered at this time.     Abilio locked and in the lowest position. Pt connected to continuous monitor with call light and personal belongings within reach.

## 2024-11-01 NOTE — ED TRIAGE NOTES
"Chief Complaint   Patient presents with    T-5000 FALL     Pt fell Monday going down half set of stairs. Pt states he slipped and went to grab with his left hand and fell onto his R shoulder and R hip. Pt denies hitting his head     Pt BIB wheelchair for above. Pt states he is not on any blood thinners, negative LOC. Pt states he has been using a cane to help ambulate. Pt aox4 gcs 15          BP (!) 139/108   Pulse (!) 105   Temp 36.4 °C (97.6 °F) (Temporal)   Resp 16   Ht 1.626 m (5' 4\")   Wt 65.8 kg (145 lb)   SpO2 96%   BMI 24.89 kg/m²     "

## 2024-11-22 DIAGNOSIS — R10.2 PELVIC PAIN: ICD-10-CM

## 2024-11-22 DIAGNOSIS — N39.0 URINARY TRACT INFECTION WITHOUT HEMATURIA, SITE UNSPECIFIED: ICD-10-CM

## 2024-11-22 DIAGNOSIS — N13.8 BPH WITH OBSTRUCTION/LOWER URINARY TRACT SYMPTOMS: ICD-10-CM

## 2024-11-22 DIAGNOSIS — N40.1 BPH WITH OBSTRUCTION/LOWER URINARY TRACT SYMPTOMS: ICD-10-CM

## 2024-11-22 DIAGNOSIS — N20.0 KIDNEY STONES: ICD-10-CM

## 2024-12-31 DIAGNOSIS — M54.42 CHRONIC MIDLINE LOW BACK PAIN WITH BILATERAL SCIATICA: ICD-10-CM

## 2024-12-31 DIAGNOSIS — G89.29 CHRONIC MIDLINE LOW BACK PAIN WITH BILATERAL SCIATICA: ICD-10-CM

## 2024-12-31 DIAGNOSIS — M54.41 CHRONIC MIDLINE LOW BACK PAIN WITH BILATERAL SCIATICA: ICD-10-CM

## 2025-01-01 NOTE — TELEPHONE ENCOUNTER
Received request via: Pharmacy    Was the patient seen in the last year in this department? Yes    Does the patient have an active prescription (recently filled or refills available) for medication(s) requested? No    [facility]    Does the patient have assisted Plus and need 100-day supply? (This applies to ALL medications) Patient does not have SCP

## 2025-01-02 RX ORDER — GABAPENTIN 300 MG/1
300 CAPSULE ORAL 3 TIMES DAILY
Qty: 180 CAPSULE | Refills: 1 | Status: SHIPPED | OUTPATIENT
Start: 2025-01-02

## 2025-01-15 DIAGNOSIS — F31.62 BIPOLAR DISORDER, CURRENT EPISODE MIXED, MODERATE (HCC): ICD-10-CM

## 2025-01-15 NOTE — TELEPHONE ENCOUNTER
Received request via: Patient    Was the patient seen in the last year in this department? Yes    Does the patient have an active prescription (recently filled or refills available) for medication(s) requested? No    Pharmacy Name: [facility] [address] [place] [address]    Does the patient have [facility] Plus and need 100-day supply? (This applies to ALL medications) Patient does not have SCP

## 2025-01-21 RX ORDER — DIVALPROEX SODIUM 250 MG/1
500 TABLET, DELAYED RELEASE ORAL 2 TIMES DAILY
Qty: 120 TABLET | Refills: 5 | Status: SHIPPED | OUTPATIENT
Start: 2025-01-21

## 2025-01-21 NOTE — TELEPHONE ENCOUNTER
Called patient to confirm his appointment for tomorrow but he stated he had to cancel because his ride cancelled on him. Patient stated he called the [facility] for help last week on getting his medication refilled Divalproex but he has not heard back from anyone. Patient stated he is out of medication and would like it sent to [facility] in [place]. Notified patient another message would be sent with his request.

## 2025-02-03 ENCOUNTER — TELEPHONE (OUTPATIENT)
Dept: MEDICAL GROUP | Facility: MEDICAL CENTER | Age: 60
End: 2025-02-03
Payer: MEDICAID

## 2025-02-03 DIAGNOSIS — F31.62 BIPOLAR DISORDER, CURRENT EPISODE MIXED, MODERATE (HCC): ICD-10-CM

## 2025-02-03 RX ORDER — DIVALPROEX SODIUM 250 MG/1
500 TABLET, DELAYED RELEASE ORAL 2 TIMES DAILY
Qty: 120 TABLET | Refills: 5 | Status: CANCELLED | OUTPATIENT
Start: 2025-02-03

## 2025-02-03 NOTE — TELEPHONE ENCOUNTER
Received request via: Patient    Was the patient seen in the last year in this department? Yes    Does the patient have an active prescription (recently filled or refills available) for medication(s) requested? No    [facility]    Does the patient have assisted Plus and need 100-day supply? (This applies to ALL medications) Patient does not have SCP

## 2025-02-03 NOTE — TELEPHONE ENCOUNTER
1. Name: Herman Chiang   Call Back Number: [phone]        How would the patient prefer to be contacted with a response: Phone call OK to leave a detailed message    Patient called to get prescription of:  divalproex (DEPAKOTE) 250 MG Tablet Delayed Response   Resent to her preferred pharmacy which is:  [facility] [address] [place] [id number]  Phone: [phone] Fax: [phone]  She would like it sent as soon as possible as she is almost out and doesn't want to have to go through withdrawals.

## 2025-02-04 DIAGNOSIS — F31.62 BIPOLAR DISORDER, CURRENT EPISODE MIXED, MODERATE (HCC): ICD-10-CM

## 2025-02-04 RX ORDER — DIVALPROEX SODIUM 250 MG/1
500 TABLET, DELAYED RELEASE ORAL 2 TIMES DAILY
Qty: 120 TABLET | Refills: 5 | Status: SHIPPED | OUTPATIENT
Start: 2025-02-04

## 2025-02-05 ENCOUNTER — HOSPITAL ENCOUNTER (OUTPATIENT)
Dept: RADIOLOGY | Facility: MEDICAL CENTER | Age: 60
End: 2025-02-05
Attending: UROLOGY
Payer: MEDICAID

## 2025-02-05 DIAGNOSIS — N39.0 URINARY TRACT INFECTION WITHOUT HEMATURIA, SITE UNSPECIFIED: ICD-10-CM

## 2025-02-05 DIAGNOSIS — N13.8 BPH WITH OBSTRUCTION/LOWER URINARY TRACT SYMPTOMS: ICD-10-CM

## 2025-02-05 DIAGNOSIS — N40.1 BPH WITH OBSTRUCTION/LOWER URINARY TRACT SYMPTOMS: ICD-10-CM

## 2025-02-05 DIAGNOSIS — R10.2 PELVIC PAIN: ICD-10-CM

## 2025-02-05 PROCEDURE — 74176 CT ABD & PELVIS W/O CONTRAST: CPT

## 2025-02-05 RX ORDER — FINASTERIDE 5 MG/1
5 TABLET, FILM COATED ORAL DAILY
Qty: 30 TABLET | Refills: 11 | Status: SHIPPED | OUTPATIENT
Start: 2025-02-05

## 2025-03-04 DIAGNOSIS — I10 ESSENTIAL HYPERTENSION: ICD-10-CM

## 2025-03-04 NOTE — TELEPHONE ENCOUNTER
Received request via: Pharmacy    Was the patient seen in the last year in this department? Yes    Does the patient have an active prescription (recently filled or refills available) for medication(s) requested? No    [facility] Name[facility]    Does the patient have [facility] Plus and need 100-day supply? (This applies to ALL medications) Patient does not have SCP

## 2025-03-05 RX ORDER — AMLODIPINE BESYLATE 5 MG/1
5 TABLET ORAL
Qty: 90 TABLET | Refills: 1 | Status: SHIPPED | OUTPATIENT
Start: 2025-03-05 | End: 2025-03-12 | Stop reason: SDUPTHER

## 2025-03-12 DIAGNOSIS — I10 ESSENTIAL HYPERTENSION: ICD-10-CM

## 2025-03-12 DIAGNOSIS — K21.9 GASTROESOPHAGEAL REFLUX DISEASE WITHOUT ESOPHAGITIS: ICD-10-CM

## 2025-03-12 RX ORDER — AMLODIPINE BESYLATE 5 MG/1
5 TABLET ORAL
Qty: 90 TABLET | Refills: 1 | Status: SHIPPED | OUTPATIENT
Start: 2025-03-12

## 2025-03-12 RX ORDER — PANTOPRAZOLE SODIUM 40 MG/1
40 TABLET, DELAYED RELEASE ORAL
Qty: 90 TABLET | Refills: 1 | Status: SHIPPED | OUTPATIENT
Start: 2025-03-12

## 2025-03-12 NOTE — TELEPHONE ENCOUNTER
Received request via: Pharmacy    Was the patient seen in the last year in this department? Yes    Does the patient have an active prescription (recently filled or refills available) for medication(s) requested? No    Pharmacy Name: [facility]     Does the patient have [facility] Plus and need 100-day supply? (This applies to ALL medications) Patient does not have SCP

## 2025-03-12 NOTE — TELEPHONE ENCOUNTER
Received request via: Pharmacy    Was the patient seen in the last year in this department? Yes    Does the patient have an active prescription (recently filled or refills available) for medication(s) requested? No    [facility] Name[facility]    Does the patient have snf Plus and need 100-day supply? (This applies to ALL medications) Patient does not have SCP

## 2025-03-20 DIAGNOSIS — F31.62 BIPOLAR DISORDER, CURRENT EPISODE MIXED, MODERATE (HCC): ICD-10-CM

## 2025-03-21 NOTE — TELEPHONE ENCOUNTER
Received request via: Pharmacy    Was the patient seen in the last year in this department? Yes    Does the patient have an active prescription (recently filled or refills available) for medication(s) requested? No    Pharmacy Name: [facility]    Does the patient have [facility] Plus and need 100-day supply? (This applies to ALL medications) Patient does not have SCP

## 2025-03-26 RX ORDER — BUPROPION HYDROCHLORIDE 300 MG/1
300 TABLET ORAL DAILY
Qty: 90 TABLET | Refills: 1 | Status: SHIPPED | OUTPATIENT
Start: 2025-03-26

## 2025-04-04 DIAGNOSIS — J41.0 SIMPLE CHRONIC BRONCHITIS (HCC): ICD-10-CM

## 2025-04-04 RX ORDER — DILTIAZEM HYDROCHLORIDE 60 MG/1
2 TABLET, FILM COATED ORAL 2 TIMES DAILY
Qty: 33 G | Refills: 5 | Status: SHIPPED | OUTPATIENT
Start: 2025-04-04

## 2025-05-31 DIAGNOSIS — E78.2 MIXED HYPERLIPIDEMIA: ICD-10-CM

## 2025-06-03 RX ORDER — ROSUVASTATIN CALCIUM 10 MG/1
10 TABLET, COATED ORAL EVERY EVENING
Qty: 90 TABLET | Refills: 3 | Status: SHIPPED | OUTPATIENT
Start: 2025-06-03